# Patient Record
Sex: FEMALE | Race: WHITE | NOT HISPANIC OR LATINO | Employment: UNEMPLOYED | URBAN - METROPOLITAN AREA
[De-identification: names, ages, dates, MRNs, and addresses within clinical notes are randomized per-mention and may not be internally consistent; named-entity substitution may affect disease eponyms.]

---

## 2017-02-01 ENCOUNTER — GENERIC CONVERSION - ENCOUNTER (OUTPATIENT)
Dept: OTHER | Facility: OTHER | Age: 27
End: 2017-02-01

## 2017-04-12 ENCOUNTER — GENERIC CONVERSION - ENCOUNTER (OUTPATIENT)
Dept: OTHER | Facility: OTHER | Age: 27
End: 2017-04-12

## 2017-07-14 ENCOUNTER — LAB REQUISITION (OUTPATIENT)
Dept: LAB | Facility: HOSPITAL | Age: 27
End: 2017-07-14
Payer: COMMERCIAL

## 2017-07-14 ENCOUNTER — ALLSCRIPTS OFFICE VISIT (OUTPATIENT)
Dept: OTHER | Facility: OTHER | Age: 27
End: 2017-07-14

## 2017-07-14 DIAGNOSIS — Z12.4 ENCOUNTER FOR SCREENING FOR MALIGNANT NEOPLASM OF CERVIX: ICD-10-CM

## 2017-07-14 PROCEDURE — G0145 SCR C/V CYTO,THINLAYER,RESCR: HCPCS | Performed by: OBSTETRICS & GYNECOLOGY

## 2017-07-21 LAB
LAB AP GYN PRIMARY INTERPRETATION: NORMAL
LAB AP LMP: NORMAL
Lab: NORMAL

## 2017-08-09 ENCOUNTER — APPOINTMENT (OUTPATIENT)
Dept: LAB | Facility: CLINIC | Age: 27
End: 2017-08-09
Payer: COMMERCIAL

## 2017-08-09 ENCOUNTER — TRANSCRIBE ORDERS (OUTPATIENT)
Dept: LAB | Facility: CLINIC | Age: 27
End: 2017-08-09

## 2017-08-09 DIAGNOSIS — Z00.8 HEALTH EXAMINATION IN POPULATION SURVEY: Primary | ICD-10-CM

## 2017-08-09 LAB
CHOLEST SERPL-MCNC: 161 MG/DL (ref 50–200)
EST. AVERAGE GLUCOSE BLD GHB EST-MCNC: 100 MG/DL
HBA1C MFR BLD: 5.1 % (ref 4.2–6.3)
HDLC SERPL-MCNC: 57 MG/DL (ref 40–60)
LDLC SERPL CALC-MCNC: 86 MG/DL (ref 0–100)
TRIGL SERPL-MCNC: 90 MG/DL

## 2017-08-09 PROCEDURE — 36415 COLL VENOUS BLD VENIPUNCTURE: CPT

## 2017-08-09 PROCEDURE — 80061 LIPID PANEL: CPT

## 2017-08-09 PROCEDURE — 83036 HEMOGLOBIN GLYCOSYLATED A1C: CPT

## 2017-10-04 ENCOUNTER — LAB REQUISITION (OUTPATIENT)
Dept: LAB | Facility: HOSPITAL | Age: 27
End: 2017-10-04
Payer: COMMERCIAL

## 2017-10-04 ENCOUNTER — GENERIC CONVERSION - ENCOUNTER (OUTPATIENT)
Dept: OTHER | Facility: OTHER | Age: 27
End: 2017-10-04

## 2017-10-04 DIAGNOSIS — Z11.3 ENCOUNTER FOR SCREENING FOR INFECTIONS WITH PREDOMINANTLY SEXUAL MODE OF TRANSMISSION: ICD-10-CM

## 2017-10-04 PROCEDURE — 87591 N.GONORRHOEAE DNA AMP PROB: CPT | Performed by: OBSTETRICS & GYNECOLOGY

## 2017-10-04 PROCEDURE — 87491 CHLMYD TRACH DNA AMP PROBE: CPT | Performed by: OBSTETRICS & GYNECOLOGY

## 2017-10-05 LAB
CHLAMYDIA DNA CVX QL NAA+PROBE: NORMAL
N GONORRHOEA DNA GENITAL QL NAA+PROBE: NORMAL

## 2017-10-23 ENCOUNTER — GENERIC CONVERSION - ENCOUNTER (OUTPATIENT)
Dept: OTHER | Facility: OTHER | Age: 27
End: 2017-10-23

## 2017-10-23 DIAGNOSIS — N64.4 MASTODYNIA: ICD-10-CM

## 2017-11-30 ENCOUNTER — HOSPITAL ENCOUNTER (OUTPATIENT)
Dept: ULTRASOUND IMAGING | Facility: CLINIC | Age: 27
Discharge: HOME | End: 2017-11-30
Payer: COMMERCIAL

## 2017-11-30 DIAGNOSIS — N64.4 MASTODYNIA: ICD-10-CM

## 2017-11-30 PROCEDURE — 76642 ULTRASOUND BREAST LIMITED: CPT

## 2017-12-05 ENCOUNTER — HOSPITAL ENCOUNTER (OUTPATIENT)
Dept: RADIOLOGY | Facility: HOSPITAL | Age: 27
Discharge: HOME | End: 2017-12-05
Attending: ORTHOPAEDIC SURGERY
Payer: COMMERCIAL

## 2017-12-05 ENCOUNTER — GENERIC CONVERSION - ENCOUNTER (OUTPATIENT)
Dept: OTHER | Facility: OTHER | Age: 27
End: 2017-12-05

## 2017-12-05 DIAGNOSIS — M25.562 PAIN IN LEFT KNEE: ICD-10-CM

## 2017-12-05 PROCEDURE — 73564 X-RAY EXAM KNEE 4 OR MORE: CPT

## 2017-12-06 ENCOUNTER — TRANSCRIBE ORDERS (OUTPATIENT)
Dept: ADMINISTRATIVE | Facility: HOSPITAL | Age: 27
End: 2017-12-06

## 2017-12-06 DIAGNOSIS — M25.562 LEFT KNEE PAIN, UNSPECIFIED CHRONICITY: Primary | ICD-10-CM

## 2017-12-17 ENCOUNTER — HOSPITAL ENCOUNTER (OUTPATIENT)
Dept: RADIOLOGY | Facility: HOSPITAL | Age: 27
Discharge: HOME | End: 2017-12-17
Attending: ORTHOPAEDIC SURGERY
Payer: COMMERCIAL

## 2017-12-17 ENCOUNTER — GENERIC CONVERSION - ENCOUNTER (OUTPATIENT)
Dept: OTHER | Facility: OTHER | Age: 27
End: 2017-12-17

## 2017-12-17 DIAGNOSIS — M25.562 LEFT KNEE PAIN, UNSPECIFIED CHRONICITY: ICD-10-CM

## 2017-12-17 PROCEDURE — 73721 MRI JNT OF LWR EXTRE W/O DYE: CPT

## 2017-12-21 ENCOUNTER — GENERIC CONVERSION - ENCOUNTER (OUTPATIENT)
Dept: OTHER | Facility: OTHER | Age: 27
End: 2017-12-21

## 2018-01-10 NOTE — MISCELLANEOUS
Provider Comments  Provider Comments:   PT NO SHOW 02/01/2017      Signatures   Electronically signed by :  [facility], ; Feb 1 2017  9:32AM EST                       (Author)

## 2018-01-12 NOTE — PROGRESS NOTES
Assessment    1  Low grade squamous intraepithelial lesion (LGSIL) (796 9) (R89 6)    Plan  Low grade squamous intraepithelial lesion (LGSIL)    · Follow-up visit in 3 months Evaluation and Treatment  Follow-up  Status: Hold For -  Scheduling  Requested for: [id number]   Ordered; For: Low grade squamous intraepithelial lesion (LGSIL); Ordered By: Sofia Mckenzie Performed:  Due: [id number]    Discussion/Summary  Discussion Summary:   As noted above here for followup visit post cryocautery of Cervix  Doing well no concerns cervix is healing normally  Recommend repeat Pap in three-months  All questions answered, followup as noted above  Chief Complaint  Chief Complaint Free Text Note Form: pt here for a follow up on the cryo pt is not having any problems from that or any new problems or concerns      History of Present Illness  HPI: 79-year-old female here for followup visit recent cryocautery of cervix for low-grade NANDA  Patient has done well post procedure has no new problems or concerns to      Active Problems    1  Birth control (V25 9) (Z30 9)   2  Encounter for routine gynecological examination (V72 31) (Z01 419)   3  Low grade squamous intraepithelial lesion (LGSIL) (796 9) (R89 6)   4  Pap smear abnormality of cervix with LGSIL (795 03) (T09 520)    Surgical History    1  History of Oral Surgery    Family History    1  No pertinent family history    2  No pertinent family history    Social History    · Always uses seat belt   · Caffeine use (V49 89) (F15 90)   · Never a smoker   · No drug use   · Single   · Social alcohol use (F10 99)    Current Meds   1  CeleXA 20 MG Oral Tablet; Therapy: (Recorded:19Nov2015) to Recorded   2  Tri-Sprintec 0 18/0 215/0 25 MG-35 MCG Oral Tablet; TAKE 1 TABLET DAILY  Requested   for: 38VAE0645; Last Rx:19Nov2015 Ordered   3  Tri-Sprintec 0 18/0 215/0 25 MG-35 MCG Oral Tablet; TAKE 1 TABLET DAILY AS   DIRECTED; Therapy: 80GUI1241 to (Evaluate:66Hkt4968);  Last Rx:06Vfn2488 Ordered    Allergies    1  No Known Drug Allergies    Vitals  Vital Signs [Data Includes: Current Encounter]    Recorded: 21DBP3158 08:66JB   Systolic 080, LUE, Sitting   Diastolic 70, LUE, Sitting   Height 5 ft 5 in   Weight 136 lb 8 oz   BMI Calculated 22 71   BSA Calculated 1 68     Physical Exam    Constitutional   General appearance: No acute distress, well appearing and well nourished  Genitourinary   External genitalia: Normal and no lesions appreciated  Vagina: Normal, no lesions or dryness appreciated  Cervix: Normal, no palpable masses  Uterus: Normal, non-tender, not enlarged, and no palpable masses  Adnexa/parametria: Normal, non-tender and no fullness or masses appreciated         Signatures   Electronically signed by : Chuy Gao DO; Jan 25 2016  4:37PM EST                       (Author)

## 2018-01-13 VITALS
WEIGHT: 122 LBS | BODY MASS INDEX: 20.33 KG/M2 | HEIGHT: 65 IN | SYSTOLIC BLOOD PRESSURE: 110 MMHG | DIASTOLIC BLOOD PRESSURE: 65 MMHG

## 2018-01-17 NOTE — MISCELLANEOUS
Provider Comments  Provider Comments:   pt missed appointment lmom asking pt to give office a call back  Signatures   Electronically signed by : Tyrone Wall, ; Apr 25 2016  9:28AM EST                       (Author)    Electronically signed by : Giovany Stein DO;  Apr 25 2016 11:15AM EST                       (Author)

## 2018-01-17 NOTE — MISCELLANEOUS
Provider Comments  Provider Comments:   PT NO SHOW 04/12/2017      Signatures   Electronically signed by :  Dilshad Sawant, ; Apr 12 2017 12:22PM EST                       (Author)

## 2018-01-22 VITALS
SYSTOLIC BLOOD PRESSURE: 112 MMHG | DIASTOLIC BLOOD PRESSURE: 60 MMHG | HEIGHT: 65 IN | WEIGHT: 118 LBS | BODY MASS INDEX: 19.66 KG/M2

## 2018-01-22 VITALS
DIASTOLIC BLOOD PRESSURE: 70 MMHG | WEIGHT: 119 LBS | SYSTOLIC BLOOD PRESSURE: 112 MMHG | HEIGHT: 65 IN | BODY MASS INDEX: 19.83 KG/M2

## 2018-01-24 VITALS
DIASTOLIC BLOOD PRESSURE: 73 MMHG | WEIGHT: 119 LBS | SYSTOLIC BLOOD PRESSURE: 112 MMHG | HEART RATE: 100 BPM | BODY MASS INDEX: 19.83 KG/M2 | HEIGHT: 65 IN

## 2018-01-24 VITALS — SYSTOLIC BLOOD PRESSURE: 99 MMHG | HEIGHT: 65 IN | HEART RATE: 80 BPM | DIASTOLIC BLOOD PRESSURE: 66 MMHG

## 2018-01-29 ENCOUNTER — TRANSCRIBE ORDERS (OUTPATIENT)
Dept: LAB | Facility: CLINIC | Age: 28
End: 2018-01-29

## 2018-01-29 ENCOUNTER — APPOINTMENT (OUTPATIENT)
Dept: LAB | Facility: CLINIC | Age: 28
End: 2018-01-29
Payer: COMMERCIAL

## 2018-01-29 DIAGNOSIS — M54.5 LOW BACK PAIN, UNSPECIFIED BACK PAIN LATERALITY, UNSPECIFIED CHRONICITY, WITH SCIATICA PRESENCE UNSPECIFIED: ICD-10-CM

## 2018-01-29 DIAGNOSIS — R79.9 ABNORMAL BLOOD FINDINGS: ICD-10-CM

## 2018-01-29 DIAGNOSIS — R35.1 NOCTURIA: ICD-10-CM

## 2018-01-29 DIAGNOSIS — M25.562 ARTHRALGIA OF LEFT KNEE: ICD-10-CM

## 2018-01-29 DIAGNOSIS — E78.5 HYPERLIPIDEMIA, UNSPECIFIED HYPERLIPIDEMIA TYPE: ICD-10-CM

## 2018-01-29 DIAGNOSIS — R45.86 BRIEF COMPENSATORY MOOD SWINGS: ICD-10-CM

## 2018-01-29 DIAGNOSIS — E78.00 HYPERCHOLESTEROLEMIA: ICD-10-CM

## 2018-01-29 DIAGNOSIS — R68.82 DECREASED LIBIDO: ICD-10-CM

## 2018-01-29 DIAGNOSIS — R73.01 IFG (IMPAIRED FASTING GLUCOSE): Primary | ICD-10-CM

## 2018-01-29 DIAGNOSIS — E53.8 VITAMIN B 12 DEFICIENCY: ICD-10-CM

## 2018-01-29 DIAGNOSIS — E28.39 HYPOGONADISM, OVARIAN: ICD-10-CM

## 2018-01-29 DIAGNOSIS — E55.9 VITAMIN D DEFICIENCY: ICD-10-CM

## 2018-01-29 DIAGNOSIS — R73.01 IFG (IMPAIRED FASTING GLUCOSE): ICD-10-CM

## 2018-01-29 DIAGNOSIS — R82.90 ABNORMAL URINE: ICD-10-CM

## 2018-01-29 DIAGNOSIS — F41.9 ANXIETY: ICD-10-CM

## 2018-01-29 DIAGNOSIS — E88.81 METABOLIC SYNDROME: ICD-10-CM

## 2018-01-29 DIAGNOSIS — R53.83 OTHER FATIGUE: ICD-10-CM

## 2018-01-29 LAB
ALBUMIN SERPL BCP-MCNC: 3.5 G/DL (ref 3.5–5)
ALP SERPL-CCNC: 47 U/L (ref 46–116)
ALT SERPL W P-5'-P-CCNC: 17 U/L (ref 12–78)
ANION GAP SERPL CALCULATED.3IONS-SCNC: 6 MMOL/L (ref 4–13)
AST SERPL W P-5'-P-CCNC: 16 U/L (ref 5–45)
BILIRUB SERPL-MCNC: 0.3 MG/DL (ref 0.2–1)
BUN SERPL-MCNC: 7 MG/DL (ref 5–25)
CALCIUM SERPL-MCNC: 8.5 MG/DL (ref 8.3–10.1)
CHLORIDE SERPL-SCNC: 107 MMOL/L (ref 100–108)
CO2 SERPL-SCNC: 26 MMOL/L (ref 21–32)
CREAT SERPL-MCNC: 0.63 MG/DL (ref 0.6–1.3)
GFR SERPL CREATININE-BSD FRML MDRD: 125 ML/MIN/1.73SQ M
GLUCOSE P FAST SERPL-MCNC: 84 MG/DL (ref 65–99)
MAGNESIUM SERPL-MCNC: 2.2 MG/DL (ref 1.6–2.6)
PHOSPHATE SERPL-MCNC: 3.1 MG/DL (ref 2.7–4.5)
POTASSIUM SERPL-SCNC: 4.5 MMOL/L (ref 3.5–5.3)
PROT SERPL-MCNC: 7.1 G/DL (ref 6.4–8.2)
SODIUM SERPL-SCNC: 139 MMOL/L (ref 136–145)
T3FREE SERPL-MCNC: 2.9 PG/ML (ref 2.3–4.2)
TSH SERPL DL<=0.05 MIU/L-ACNC: 2.02 UIU/ML (ref 0.36–3.74)
URATE SERPL-MCNC: 4.3 MG/DL (ref 2–6.8)
VIT B12 SERPL-MCNC: 363 PG/ML (ref 100–900)

## 2018-01-29 PROCEDURE — 82607 VITAMIN B-12: CPT

## 2018-01-29 PROCEDURE — 84443 ASSAY THYROID STIM HORMONE: CPT

## 2018-01-29 PROCEDURE — 36415 COLL VENOUS BLD VENIPUNCTURE: CPT

## 2018-01-29 PROCEDURE — 83735 ASSAY OF MAGNESIUM: CPT

## 2018-01-29 PROCEDURE — 83789 MASS SPECTROMETRY QUAL/QUAN: CPT

## 2018-01-29 PROCEDURE — 84481 FREE ASSAY (FT-3): CPT

## 2018-01-29 PROCEDURE — 84100 ASSAY OF PHOSPHORUS: CPT

## 2018-01-29 PROCEDURE — 84550 ASSAY OF BLOOD/URIC ACID: CPT

## 2018-01-29 PROCEDURE — 80053 COMPREHEN METABOLIC PANEL: CPT

## 2018-01-31 LAB — IODINE SERPL-MCNC: 72.9 UG/L (ref 40–92)

## 2018-04-06 ENCOUNTER — HOSPITAL ENCOUNTER (EMERGENCY)
Facility: HOSPITAL | Age: 28
Discharge: HOME | End: 2018-04-06
Attending: EMERGENCY MEDICINE | Admitting: EMERGENCY MEDICINE
Payer: OTHER MISCELLANEOUS

## 2018-04-06 VITALS
SYSTOLIC BLOOD PRESSURE: 128 MMHG | HEIGHT: 65 IN | RESPIRATION RATE: 18 BRPM | OXYGEN SATURATION: 99 % | WEIGHT: 120 LBS | HEART RATE: 81 BPM | TEMPERATURE: 97.2 F | BODY MASS INDEX: 19.99 KG/M2 | DIASTOLIC BLOOD PRESSURE: 56 MMHG

## 2018-04-06 DIAGNOSIS — IMO0001 NEEDLE STICK INJURY OF FINGER OF LEFT HAND, INITIAL ENCOUNTER: Primary | ICD-10-CM

## 2018-04-06 LAB — ALT SERPL W P-5'-P-CCNC: 14 U/L (ref 12–78)

## 2018-04-06 PROCEDURE — 86803 HEPATITIS C AB TEST: CPT | Performed by: EMERGENCY MEDICINE

## 2018-04-06 PROCEDURE — 86706 HEP B SURFACE ANTIBODY: CPT | Performed by: EMERGENCY MEDICINE

## 2018-04-06 PROCEDURE — 87340 HEPATITIS B SURFACE AG IA: CPT | Performed by: EMERGENCY MEDICINE

## 2018-04-06 PROCEDURE — 36415 COLL VENOUS BLD VENIPUNCTURE: CPT | Performed by: EMERGENCY MEDICINE

## 2018-04-06 PROCEDURE — 87389 HIV-1 AG W/HIV-1&-2 AB AG IA: CPT | Performed by: EMERGENCY MEDICINE

## 2018-04-06 PROCEDURE — 99283 EMERGENCY DEPT VISIT LOW MDM: CPT

## 2018-04-06 PROCEDURE — 84460 ALANINE AMINO (ALT) (SGPT): CPT | Performed by: EMERGENCY MEDICINE

## 2018-04-06 NOTE — DISCHARGE INSTRUCTIONS
Puncture Wound   WHAT YOU NEED TO KNOW:   A puncture wound is a hole in the skin made by a sharp, pointed object  WHILE YOU ARE HERE:   Informed consent  is a legal document that explains the tests, treatments, or procedures that you may need  Informed consent means you understand what will be done and can make decisions about what you want  You give your permission when you sign the consent form  You can have someone sign this form for you if you are not able to sign it  You have the right to understand your medical care in words you know  Before you sign the consent form, understand the risks and benefits of what will be done  Make sure all your questions are answered  Medicines:   · Antibiotics  help fight or prevent an infection caused by bacteria  · NSAIDs  help decrease swelling, pain, and fever  · A Td vaccine  is a booster shot used to help prevent diphtheria and tetanus  The Td booster may be given to adolescents and adults every 10 years or for certain wounds and injuries  Tests:   · Blood tests  may be done to check for infection  · X-rays  may be done to look for broken bones or other injuries around your wound  They may also be used to check for foreign objects such as dirt or metal     · A CT or MRI scan  may be used to take pictures of the bones and tissues in your wound area  healthcare providers use these pictures to look for objects left in the wound or near the bones  You may be given dye to help the bones and tissues show up better  Tell the healthcare provider if you have ever had an allergic reaction to contrast dye  Do not enter the MRI room with anything metal  Metal can cause serious injury  Tell the healthcare provider if you have any metal in or on your body  · An ultrasound  uses sound waves to show pictures of your organs and tissues on a monitor  · A bone scan  is a test that may be done to look for broken bones or infections   A radioactive liquid, called a tracer, is given through an IV  The tracer collects in your bones so problems show up better on the monitor  Treatment:   · Cleansing  may be done by rinsing the wound with sterile water  Germ-killing solutions may also be used  Your healthcare provider may cut open a part of the affected area to clean it better  · Debridement  is surgery to clean and remove objects, dirt, or dead skin and tissues from the wound area  Your healthcare providers may also drain the wound to clean out pus  · Surgery  may be needed if your wound is deep and blood vessels, bones, or nerves need to be repaired  Your wound may be left open until it heals, or it may be closed right away with stitches  RISKS:   A puncture wound may be more serious than it looks  Blood vessels, nerves, bones, and other tissues under the skin may be damaged  The wound may become infected when germs get into it  Infection often occurs when the object that caused the wound carries germs or pushes dirt into the tissues  CARE AGREEMENT:   You have the right to help plan your care  Learn about your health condition and how it may be treated  Discuss treatment options with your caregivers to decide what care you want to receive  You always have the right to refuse treatment  © 2016 0168 Kasandra Delgado is for End User's use only and may not be sold, redistributed or otherwise used for commercial purposes  All illustrations and images included in CareNotes® are the copyrighted property of [employer]  or Raimundo Long  The above information is an  only  It is not intended as medical advice for individual conditions or treatments  Talk to your doctor, nurse or pharmacist before following any medical regimen to see if it is safe and effective for you

## 2018-04-06 NOTE — ED PROVIDER NOTES
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 and sustained a needlestick injury to the left thumb  This was a suture needle, not a hyper dome rectal   Patient was wearing gloves  Patient states she took her cause of 7 washed the area thoroughly  Patient is up-to-date on her hepatitis vaccines, 3 in childhood according to her but she does not remember the particular date  Patient is up-to-date on her tetanus  Patient received vaccines when she started working at [address] [place] [address] [employer] in 2015  Patient denies any other symptoms  Patient denies skin changes  Patient does not know the source patient's past medical history  Source patient is currently in the OR  Patient denies fever chills rigors headache lightheadedness dizziness chest pain palpitations shortness of breath cough pleurisy abdominal pain nausea vomiting diarrhea constipation urinary symptoms motor weakness numbness tingling    Prior to Admission Medications   Prescriptions Last Dose Informant Patient Reported? Taking? ALPRAZolam (XANAX) 0 25 mg tablet   Yes No   Sig: Take 0 25 mg by mouth daily at bedtime as needed for anxiety  Aspirin-Acetaminophen-Caffeine (EXCEDRIN PO)   Yes No   Sig: Take 1 tablet by mouth daily as needed  Norgestim-Eth Estrad Triphasic (TRI-SPRINTEC PO)   Yes No   Sig: Take 1 tablet by mouth daily  citalopram (CeleXA) 10 mg tablet   Yes No   Sig: Take 10 mg by mouth daily  multivitamin (THERAGRAN) TABS   Yes No   Sig: Take 1 tablet by mouth daily        Facility-Administered Medications: None       Past Medical History:   Diagnosis Date  Anxiety     mild    Depression     Headache     migranes    IBS (irritable bowel syndrome)        Past Surgical History:   Procedure Laterality Date    HI COLONOSCOPY FLX DX W/COLLJ SPEC WHEN PFRMD N/A 8/24/2016    Procedure: EGD AND COLONOSCOPY;  Surgeon: Grecia Rodriguez MD;  Location: [facility]; Service: Gastroenterology    WISDOM TOOTH EXTRACTION      all four       No family history on file  I have reviewed and agree with the history as documented  Social History   Substance Use Topics    Smoking status: Never Smoker    Smokeless tobacco: Never Used    Alcohol use Yes      Comment: socially        Review of Systems   Constitutional: Negative for activity change, appetite change, chills, diaphoresis, fatigue, fever and unexpected weight change  HENT: Negative for congestion, ear discharge, ear pain, facial swelling, hearing loss, nosebleeds, postnasal drip, rhinorrhea, sinus pressure, sneezing, sore throat and tinnitus  Eyes: Negative for photophobia, pain, redness, itching and visual disturbance  Respiratory: Negative for cough, chest tightness, shortness of breath, wheezing and stridor  Cardiovascular: Negative for chest pain, palpitations and leg swelling  Gastrointestinal: Negative for abdominal distention, abdominal pain, anal bleeding, blood in stool, constipation, diarrhea, nausea and vomiting  Endocrine: Negative for polydipsia, polyphagia and polyuria  Genitourinary: Negative for decreased urine volume, difficulty urinating, dysuria, enuresis, flank pain, frequency, hematuria, menstrual problem, urgency, vaginal bleeding, vaginal discharge and vaginal pain  Musculoskeletal: Negative for arthralgias, back pain, gait problem, joint swelling, myalgias, neck pain and neck stiffness  Skin: Negative for rash and wound  Allergic/Immunologic: Negative for environmental allergies, food allergies and immunocompromised state     Neurological: Negative for dizziness, tremors, seizures, syncope, facial asymmetry, speech difficulty, weakness, light-headedness, numbness and headaches  Hematological: Negative for adenopathy  Psychiatric/Behavioral: Negative for agitation, behavioral problems, confusion, dysphoric mood, hallucinations, self-injury, sleep disturbance and suicidal ideas  The patient is not nervous/anxious and is not hyperactive  Physical Exam  ED Triage Vitals [04/06/18 1345]   Temperature Pulse Respirations Blood Pressure SpO2   (!) 97 2 °F (36 2 °C) 81 18 128/56 99 %      Temp Source Heart Rate Source Patient Position - Orthostatic VS BP Location FiO2 (%)   Oral Monitor Sitting Left arm --      Pain Score       No Pain           Orthostatic Vital Signs  Vitals:    04/06/18 1345   BP: 128/56   Pulse: 81   Patient Position - Orthostatic VS: Sitting       Physical Exam   Constitutional: She is oriented to person, place, and time  She appears well-developed and well-nourished  No distress  HENT:   Head: Normocephalic and atraumatic  Right Ear: External ear normal    Left Ear: External ear normal    Nose: Nose normal    Mouth/Throat: Oropharynx is clear and moist  No oropharyngeal exudate  Eyes: Conjunctivae and EOM are normal  Pupils are equal, round, and reactive to light  Right eye exhibits no discharge  Left eye exhibits no discharge  No scleral icterus  Neck: Normal range of motion  Neck supple  No JVD present  No tracheal deviation present  No thyromegaly present  Cardiovascular: Normal rate, regular rhythm, normal heart sounds and intact distal pulses  No murmur heard  Pulmonary/Chest: Effort normal and breath sounds normal  No stridor  No respiratory distress  She has no wheezes  Abdominal: Soft  Bowel sounds are normal  She exhibits no distension and no mass  There is no tenderness  There is no rebound and no guarding  No hernia  Musculoskeletal: Normal range of motion  She exhibits no edema, tenderness or deformity          Right hand: She exhibits normal range of motion, no tenderness, no bony tenderness, normal two-point discrimination, normal capillary refill, no deformity and no laceration  Normal sensation noted  Decreased sensation is not present in the ulnar distribution, is not present in the medial distribution and is not present in the radial distribution  Normal strength noted  She exhibits no finger abduction, no thumb/finger opposition and no wrist extension trouble  Left hand: She exhibits normal range of motion, no tenderness, no bony tenderness, normal two-point discrimination, normal capillary refill, no deformity, no laceration and no swelling  Normal sensation noted  Decreased sensation is not present in the ulnar distribution, is not present in the medial redistribution and is not present in the radial distribution  Normal strength noted  She exhibits no finger abduction, no thumb/finger opposition and no wrist extension trouble  Lymphadenopathy:     She has no cervical adenopathy  Neurological: She is alert and oriented to person, place, and time  She displays normal reflexes  No cranial nerve deficit  She exhibits normal muscle tone  Skin: Skin is warm and dry  No rash noted  She is not diaphoretic  No erythema  Psychiatric: She has a normal mood and affect  Her behavior is normal  Judgment and thought content normal    Nursing note and vitals reviewed  ED Medications  Medications - No data to display    Diagnostic Studies  Results Reviewed     Procedure Component Value Units Date/Time    HIV 1/2 AG-AB combo [[id number]] Collected:  04/06/18 1418    Lab Status: In process Specimen:  Blood from Arm, Right Updated:  04/06/18 1422    ALT [[id number]] Collected:  04/06/18 1418    Lab Status: In process Specimen:  Blood from Arm, Right Updated:  04/06/18 1422    Hepatitis B surface antigen [[id number]] Collected:  04/06/18 1418    Lab Status:   In process Specimen:  Blood from Arm, Right Updated:  04/06/18 1422 Hepatitis C antibody [[id number]] Collected:  04/06/18 1418    Lab Status: In process Specimen:  Blood from Arm, Right Updated:  04/06/18 1422    Hepatitis B surface antibody [[id number]] Collected:  04/06/18 1418    Lab Status: In process Specimen:  Blood from Arm, Right Updated:  04/06/18 1422                 No orders to display         Procedures  Procedures      Phone Consults  ED Phone Contact    ED Course  ED Course                                MDM  Number of Diagnoses or Management Options  Needle stick injury of finger of left hand, initial encounter:   Diagnosis management comments: 47-year-old female presenting with needlestick injury to her left thumb  Surgical tech at [employer]  On exam vital signs normal normal physical exam including the site of injury no signs of infection no erythema lymphangitis  Drawn exposure panel from patient  Contacted surgical team Jameel Yañez agreed to drugs posterior panel on patient  Patient's name is documented in paper chart with paperwork filled out and faxed employ health  Patient is up-to-date on vaccines, needle 6 sounds low risk as this is a suture needle not large gauge hypodermic needle  Patient does not have infectious disease when talking to surgery team   Patient will follow up employee health the next 3 days for follow up on rapid exposure panel  ED return precautions discussed  Patient agrees to follow up with [employer] health  CritCare Time    Disposition  Final diagnoses:   Needle stick injury of finger of left hand, initial encounter     Time reflects when diagnosis was documented in both MDM as applicable and the Disposition within this note     Time User Action Codes Description Comment    4/6/2018  2:20 PM Danielle Lopez Add [C70 046A,  W27  3XXA] Needle stick injury of finger of left hand, initial encounter       ED Disposition     ED Disposition Condition Comment    Discharge  [facility] discharge to home/self care     Condition at discharge: Good    Return precautions were discussed with patient  Patient understands when to return to  Emergency department  Patient agrees to discharge plan and follow up care  Follow-up Information     Follow up With Specialties Details Why Contact Info Additional Information    Denzel Álvarez in 3 days  [address]  [address], [address] [address], [place], [place], [address]        Patient's Medications   Discharge Prescriptions    No medications on file     No discharge procedures on file  ED Provider  Attending physically available and evaluated Samir Santos I managed the patient along with the ED Attending      Electronically Signed by         Manny Newton DO  04/06/18 0716

## 2018-04-06 NOTE — ED ATTENDING ATTESTATION
Trina Angeles DO, saw and evaluated the patient  I have discussed the patient with the resident/non-physician practitioner and agree with the resident's/non-physician practitioner's findings, Plan of Care, and MDM as documented in the resident's/non-physician practitioner's note, except where noted  All available labs and Radiology studies were reviewed  At this point I agree with the current assessment done in the Emergency Department  I have conducted an independent evaluation of this patient a history and physical is as follows:    24 F works as OR nurse  Stuck with suture needle  Was wearing gloves  Immediatley washed area  Labs obtained from patient and source patient, workman injury/accidental exposure paperwork completed and sent to supervisor, no need for empiric treatment at this time  Low risk exposure       Critical Care Time  CritCare Time    Procedures

## 2018-04-07 LAB
HBV SURFACE AB SER-ACNC: 18.88 MIU/ML
HBV SURFACE AG SER QL: NORMAL
HCV AB SER QL: NORMAL

## 2018-04-09 LAB — HIV 1+2 AB+HIV1 P24 AG SERPL QL IA: NORMAL

## 2018-08-02 ENCOUNTER — ANNUAL EXAM (OUTPATIENT)
Dept: OBGYN CLINIC | Facility: CLINIC | Age: 28
End: 2018-08-02
Payer: COMMERCIAL

## 2018-08-02 VITALS
SYSTOLIC BLOOD PRESSURE: 98 MMHG | DIASTOLIC BLOOD PRESSURE: 56 MMHG | BODY MASS INDEX: 20.73 KG/M2 | WEIGHT: 124.4 LBS | HEIGHT: 65 IN

## 2018-08-02 DIAGNOSIS — Z12.4 SCREENING FOR CERVICAL CANCER: ICD-10-CM

## 2018-08-02 DIAGNOSIS — Z30.41 ENCOUNTER FOR SURVEILLANCE OF CONTRACEPTIVE PILLS: Primary | ICD-10-CM

## 2018-08-02 DIAGNOSIS — Z87.410 PERSONAL HISTORY OF CERVICAL DYSPLASIA: ICD-10-CM

## 2018-08-02 DIAGNOSIS — Z11.51 SCREENING FOR HPV (HUMAN PAPILLOMAVIRUS): ICD-10-CM

## 2018-08-02 PROCEDURE — G0143 SCR C/V CYTO,THINLAYER,RESCR: HCPCS | Performed by: OBSTETRICS & GYNECOLOGY

## 2018-08-02 PROCEDURE — 87624 HPV HI-RISK TYP POOLED RSLT: CPT | Performed by: OBSTETRICS & GYNECOLOGY

## 2018-08-02 PROCEDURE — 99395 PREV VISIT EST AGE 18-39: CPT | Performed by: OBSTETRICS & GYNECOLOGY

## 2018-08-02 RX ORDER — NORGESTIMATE AND ETHINYL ESTRADIOL 7DAYSX3 28
1 KIT ORAL DAILY
COMMUNITY
Start: 2017-10-23 | End: 2018-08-02 | Stop reason: SDUPTHER

## 2018-08-02 RX ORDER — NORGESTIMATE AND ETHINYL ESTRADIOL 7DAYSX3 28
1 KIT ORAL DAILY
Qty: 84 TABLET | Refills: 3 | Status: SHIPPED | OUTPATIENT
Start: 2018-08-02 | End: 2019-08-19 | Stop reason: SDUPTHER

## 2018-08-02 RX ORDER — FLUOXETINE 10 MG/1
20 CAPSULE ORAL DAILY
COMMUNITY
End: 2019-08-26

## 2018-08-02 NOTE — PROGRESS NOTES
Assessment   Annual well-woman exam  Contraceptive management  No new problems or concerns        Plan   Renew OCPs  Reviewed self-breast exam   All questions answered  Await pap smear results  Subjective here for annual exam and renewal of birth control pills, no new problems     Darrell Jenkins is a 24 y o  female who presents for annual exam  Periods are regular every 28-30 days, lasting 5 days  Dysmenorrhea:none  Cyclic symptoms include none  No intermenstrual bleeding, spotting, or discharge  The patient reports that there is not domestic violence in her life  Current contraception: OCP (estrogen/progesterone)  History of abnormal Pap smear: yes -LGSIL treated with cryocautery  Family history of uterine or ovarian cancer: no  Regular self breast exam: yes  History of abnormal mammogram: no  Family history of breast cancer: no  History of abnormal lipids: no  Menstrual History:  OB History     No data available         Menarche age: 17  Patient's last menstrual period was 07/19/2018 (approximate)  Review of Systems  Pertinent items are noted in HPI  Objective no acute distress     BP 98/56 (BP Location: Left arm, Patient Position: Sitting, Cuff Size: Standard)   Ht 5' 5" (1 651 m)   Wt 56 4 kg (124 lb 6 4 oz)   LMP 07/19/2018 (Approximate)   Breastfeeding?  No   BMI 20 70 kg/m²     General:   alert and oriented, in no acute distress, alert, appears stated age and cooperative   Heart: regular rate and rhythm, S1, S2 normal, no murmur, click, rub or gallop   Lungs: clear to auscultation bilaterally   Abdomen: soft, non-tender, without masses or organomegaly   Vulva: normal, Bartholin's, Urethra, Nocona's normal, female escutcheon   Vagina: normal mucosa, normal discharge   Cervix: no bleeding following Pap, no cervical motion tenderness, no lesions and nulliparous appearance   Uterus: anteverted, mobile, non-tender, normal shape and consistency   Adnexa: normal adnexa   Breast exam bilateral breast exam in the sitting supine position with chaperone present no supraclavicular axillary lymphadenopathy noted no visible or palpable breast lesions identified no nipple discharge  Reviewed self-breast exam with patient

## 2018-08-03 LAB — HPV RRNA GENITAL QL NAA+PROBE: NORMAL

## 2018-08-07 LAB
LAB AP GYN PRIMARY INTERPRETATION: NORMAL
Lab: NORMAL

## 2018-08-14 ENCOUNTER — APPOINTMENT (OUTPATIENT)
Dept: LAB | Facility: CLINIC | Age: 28
End: 2018-08-14

## 2018-08-14 ENCOUNTER — TRANSCRIBE ORDERS (OUTPATIENT)
Dept: ADMINISTRATIVE | Facility: HOSPITAL | Age: 28
End: 2018-08-14

## 2018-08-14 DIAGNOSIS — Z00.8 HEALTH EXAMINATION IN POPULATION SURVEY: ICD-10-CM

## 2018-08-14 DIAGNOSIS — Z00.8 HEALTH EXAMINATION IN POPULATION SURVEY: Primary | ICD-10-CM

## 2018-08-14 LAB
CHOLEST SERPL-MCNC: 136 MG/DL (ref 50–200)
EST. AVERAGE GLUCOSE BLD GHB EST-MCNC: 85 MG/DL
HBA1C MFR BLD: 4.6 % (ref 4.2–6.3)
HDLC SERPL-MCNC: 46 MG/DL (ref 40–60)
LDLC SERPL CALC-MCNC: 65 MG/DL (ref 0–100)
NONHDLC SERPL-MCNC: 90 MG/DL
TRIGL SERPL-MCNC: 124 MG/DL

## 2018-08-14 PROCEDURE — 83036 HEMOGLOBIN GLYCOSYLATED A1C: CPT

## 2018-08-14 PROCEDURE — 80061 LIPID PANEL: CPT

## 2018-08-14 PROCEDURE — 36415 COLL VENOUS BLD VENIPUNCTURE: CPT

## 2018-11-15 ENCOUNTER — OFFICE VISIT (OUTPATIENT)
Dept: OBGYN CLINIC | Facility: CLINIC | Age: 28
End: 2018-11-15
Payer: COMMERCIAL

## 2018-11-15 VITALS
BODY MASS INDEX: 20.49 KG/M2 | SYSTOLIC BLOOD PRESSURE: 100 MMHG | DIASTOLIC BLOOD PRESSURE: 68 MMHG | HEIGHT: 65 IN | WEIGHT: 123 LBS

## 2018-11-15 DIAGNOSIS — R87.615 PAP SMEAR OF CERVIX UNSATISFACTORY: ICD-10-CM

## 2018-11-15 DIAGNOSIS — Z30.41 ENCOUNTER FOR SURVEILLANCE OF CONTRACEPTIVE PILLS: ICD-10-CM

## 2018-11-15 DIAGNOSIS — Z87.410 HISTORY OF CERVICAL DYSPLASIA: ICD-10-CM

## 2018-11-15 DIAGNOSIS — Z11.51 SCREENING FOR HUMAN PAPILLOMAVIRUS (HPV): Primary | ICD-10-CM

## 2018-11-15 PROCEDURE — REPAP: Performed by: OBSTETRICS & GYNECOLOGY

## 2018-11-15 PROCEDURE — G0145 SCR C/V CYTO,THINLAYER,RESCR: HCPCS | Performed by: OBSTETRICS & GYNECOLOGY

## 2018-11-15 RX ORDER — NORGESTIMATE AND ETHINYL ESTRADIOL 7DAYSX3 LO
1 KIT ORAL DAILY
COMMUNITY
End: 2019-08-26

## 2018-11-15 NOTE — PROGRESS NOTES
Assessment/Plan:    Diagnoses and all orders for this visit:    Pap smear of cervix unsatisfactory    History of cervical dysplasia    Encounter for surveillance of contraceptive pills    Other orders  -     norgestimate-ethinyl estradiol (ORTHO TRI-CYCLEN LO) 0 18/0 215/0 25 MG-25 MCG per tablet; Take 1 tablet by mouth daily        Subjective:  Here for repeat Pap, missing endocervical cells     Patient ID: Roselyn Mckeon is a 34 y o  female  HPI   16-year-old female nulliparous here for annual exam in August this past year  Endocervical cells were missing on the Pap  She did have a history of low-grade cervical dysplasia in the past and she has had a cryocautery of the cervix as recently as 2016  Patient is on OCPs  States her cycles are regular denies any breakthrough bleeding denies any pelvic pain symptoms  Repeat Pap today if normal with the 1 more Pap next year and then surveillance could be every 3 years thereafter if normal     Review of Systems   All other systems reviewed and are negative  Objective:  /68 height 5 foot 5 weight 123 lb, no acute distress     Physical Exam   Constitutional: She is oriented to person, place, and time  She appears well-developed and well-nourished  HENT:   Head: Normocephalic  Eyes: Pupils are equal, round, and reactive to light  Neck: Normal range of motion  Genitourinary:   Genitourinary Comments: Pelvic exam  Normal external genitalia  Normal appearing cervix  No CMT  No pelvic masses on bimanual exam   Return for annual exam next year  Musculoskeletal: Normal range of motion  Neurological: She is alert and oriented to person, place, and time  Skin: Skin is warm and dry  Psychiatric: She has a normal mood and affect

## 2018-11-26 LAB
LAB AP GYN PRIMARY INTERPRETATION: NORMAL
Lab: NORMAL

## 2019-02-22 ENCOUNTER — TRANSCRIBE ORDERS (OUTPATIENT)
Dept: ADMINISTRATIVE | Facility: HOSPITAL | Age: 29
End: 2019-02-22

## 2019-02-22 ENCOUNTER — CLINICAL SUPPORT (OUTPATIENT)
Dept: URGENT CARE | Facility: CLINIC | Age: 29
End: 2019-02-22
Payer: COMMERCIAL

## 2019-02-22 DIAGNOSIS — R00.2 PALPITATIONS: Primary | ICD-10-CM

## 2019-02-22 DIAGNOSIS — R00.2 PALPITATIONS: ICD-10-CM

## 2019-02-22 PROCEDURE — 93005 ELECTROCARDIOGRAM TRACING: CPT

## 2019-02-24 LAB
ATRIAL RATE: 73 BPM
P AXIS: 26 DEGREES
PR INTERVAL: 130 MS
QRS AXIS: 75 DEGREES
QRSD INTERVAL: 74 MS
QT INTERVAL: 420 MS
QTC INTERVAL: 462 MS
T WAVE AXIS: 56 DEGREES
VENTRICULAR RATE: 73 BPM

## 2019-02-24 PROCEDURE — 93010 ELECTROCARDIOGRAM REPORT: CPT | Performed by: INTERNAL MEDICINE

## 2019-04-30 ENCOUNTER — TRANSCRIBE ORDERS (OUTPATIENT)
Dept: ADMINISTRATIVE | Facility: HOSPITAL | Age: 29
End: 2019-04-30

## 2019-04-30 ENCOUNTER — APPOINTMENT (OUTPATIENT)
Dept: LAB | Facility: CLINIC | Age: 29
End: 2019-04-30
Payer: COMMERCIAL

## 2019-04-30 DIAGNOSIS — R42 DIZZINESS AND GIDDINESS: ICD-10-CM

## 2019-04-30 DIAGNOSIS — E55.9 AVITAMINOSIS D: ICD-10-CM

## 2019-04-30 DIAGNOSIS — F41.9 ANXIETY HYPERVENTILATION: ICD-10-CM

## 2019-04-30 DIAGNOSIS — M25.50 PAIN IN JOINT, MULTIPLE SITES: ICD-10-CM

## 2019-04-30 DIAGNOSIS — I51.9 MYXEDEMA HEART DISEASE: ICD-10-CM

## 2019-04-30 DIAGNOSIS — E83.42 HYPOMAGNESEMIA: ICD-10-CM

## 2019-04-30 DIAGNOSIS — R68.82 DECREASED LIBIDO: ICD-10-CM

## 2019-04-30 DIAGNOSIS — E88.81 DYSMETABOLIC SYNDROME X: ICD-10-CM

## 2019-04-30 DIAGNOSIS — R73.01 IMPAIRED FASTING GLUCOSE: ICD-10-CM

## 2019-04-30 DIAGNOSIS — F45.8 ANXIETY HYPERVENTILATION: ICD-10-CM

## 2019-04-30 DIAGNOSIS — R51.9 FACIAL PAIN: ICD-10-CM

## 2019-04-30 DIAGNOSIS — R73.01 IMPAIRED FASTING GLUCOSE: Primary | ICD-10-CM

## 2019-04-30 DIAGNOSIS — R53.83 OTHER FATIGUE: ICD-10-CM

## 2019-04-30 DIAGNOSIS — K21.9 GASTROESOPHAGEAL REFLUX DISEASE WITHOUT ESOPHAGITIS: ICD-10-CM

## 2019-04-30 DIAGNOSIS — E53.8 BIOTIN-(PROPIONYL-COA-CARBOXYLASE) LIGASE DEFICIENCY: ICD-10-CM

## 2019-04-30 DIAGNOSIS — E03.9 MYXEDEMA HEART DISEASE: ICD-10-CM

## 2019-04-30 LAB
25(OH)D3 SERPL-MCNC: 25.5 NG/ML (ref 30–100)
ALBUMIN SERPL BCP-MCNC: 4.4 G/DL (ref 3.5–5)
ALP SERPL-CCNC: 66 U/L (ref 46–116)
ALT SERPL W P-5'-P-CCNC: 19 U/L (ref 12–78)
ANION GAP SERPL CALCULATED.3IONS-SCNC: 8 MMOL/L (ref 4–13)
AST SERPL W P-5'-P-CCNC: 16 U/L (ref 5–45)
BILIRUB SERPL-MCNC: 0.72 MG/DL (ref 0.2–1)
BUN SERPL-MCNC: 8 MG/DL (ref 5–25)
CALCIUM SERPL-MCNC: 8.6 MG/DL (ref 8.3–10.1)
CHLORIDE SERPL-SCNC: 104 MMOL/L (ref 100–108)
CO2 SERPL-SCNC: 28 MMOL/L (ref 21–32)
CREAT SERPL-MCNC: 0.64 MG/DL (ref 0.6–1.3)
FERRITIN SERPL-MCNC: 32 NG/ML (ref 8–388)
FOLATE SERPL-MCNC: >20 NG/ML (ref 3.1–17.5)
GFR SERPL CREATININE-BSD FRML MDRD: 124 ML/MIN/1.73SQ M
GLUCOSE SERPL-MCNC: 78 MG/DL (ref 65–140)
HETEROPH AB SER QL: NEGATIVE
IRON SATN MFR SERPL: 27 %
IRON SERPL-MCNC: 104 UG/DL (ref 50–170)
PHOSPHATE SERPL-MCNC: 4.1 MG/DL (ref 2.7–4.5)
POTASSIUM SERPL-SCNC: 3.8 MMOL/L (ref 3.5–5.3)
PROT SERPL-MCNC: 7.5 G/DL (ref 6.4–8.2)
SODIUM SERPL-SCNC: 140 MMOL/L (ref 136–145)
TIBC SERPL-MCNC: 383 UG/DL (ref 250–450)
TSH SERPL DL<=0.05 MIU/L-ACNC: 0.91 UIU/ML (ref 0.36–3.74)
VIT B12 SERPL-MCNC: 563 PG/ML (ref 100–900)

## 2019-04-30 PROCEDURE — 84443 ASSAY THYROID STIM HORMONE: CPT

## 2019-04-30 PROCEDURE — 84100 ASSAY OF PHOSPHORUS: CPT

## 2019-04-30 PROCEDURE — 83550 IRON BINDING TEST: CPT

## 2019-04-30 PROCEDURE — 83540 ASSAY OF IRON: CPT

## 2019-04-30 PROCEDURE — 86308 HETEROPHILE ANTIBODY SCREEN: CPT

## 2019-04-30 PROCEDURE — 82306 VITAMIN D 25 HYDROXY: CPT

## 2019-04-30 PROCEDURE — 82746 ASSAY OF FOLIC ACID SERUM: CPT

## 2019-04-30 PROCEDURE — 80053 COMPREHEN METABOLIC PANEL: CPT

## 2019-04-30 PROCEDURE — 36415 COLL VENOUS BLD VENIPUNCTURE: CPT

## 2019-04-30 PROCEDURE — 82607 VITAMIN B-12: CPT

## 2019-04-30 PROCEDURE — 82728 ASSAY OF FERRITIN: CPT

## 2019-04-30 PROCEDURE — 83735 ASSAY OF MAGNESIUM: CPT

## 2019-05-02 LAB — MAGNESIUM RBC-MCNC: 5.1 MG/DL (ref 4.2–6.8)

## 2019-08-14 ENCOUNTER — TRANSCRIBE ORDERS (OUTPATIENT)
Dept: ADMINISTRATIVE | Facility: HOSPITAL | Age: 29
End: 2019-08-14

## 2019-08-14 DIAGNOSIS — G40.119 EPILEPSIA PARTIALIS CONTINUA WITH INTRACTABLE EPILEPSY (HCC): Primary | ICD-10-CM

## 2019-08-19 ENCOUNTER — ANNUAL EXAM (OUTPATIENT)
Dept: OBGYN CLINIC | Facility: CLINIC | Age: 29
End: 2019-08-19
Payer: COMMERCIAL

## 2019-08-19 VITALS
BODY MASS INDEX: 20.59 KG/M2 | HEIGHT: 65 IN | WEIGHT: 123.6 LBS | DIASTOLIC BLOOD PRESSURE: 72 MMHG | SYSTOLIC BLOOD PRESSURE: 120 MMHG

## 2019-08-19 DIAGNOSIS — Z01.419 WOMEN'S ANNUAL ROUTINE GYNECOLOGICAL EXAMINATION: Primary | ICD-10-CM

## 2019-08-19 DIAGNOSIS — F41.8 INSOMNIA SECONDARY TO DEPRESSION WITH ANXIETY: ICD-10-CM

## 2019-08-19 DIAGNOSIS — Z30.41 ENCOUNTER FOR SURVEILLANCE OF CONTRACEPTIVE PILLS: ICD-10-CM

## 2019-08-19 DIAGNOSIS — Z87.410 HISTORY OF CERVICAL DYSPLASIA: ICD-10-CM

## 2019-08-19 DIAGNOSIS — F51.05 INSOMNIA SECONDARY TO DEPRESSION WITH ANXIETY: ICD-10-CM

## 2019-08-19 PROCEDURE — G0145 SCR C/V CYTO,THINLAYER,RESCR: HCPCS | Performed by: OBSTETRICS & GYNECOLOGY

## 2019-08-19 PROCEDURE — 99213 OFFICE O/P EST LOW 20 MIN: CPT | Performed by: OBSTETRICS & GYNECOLOGY

## 2019-08-19 RX ORDER — TIZANIDINE 4 MG/1
TABLET ORAL
COMMUNITY
End: 2020-03-02

## 2019-08-19 RX ORDER — SUMATRIPTAN 100 MG/1
TABLET, FILM COATED ORAL
COMMUNITY
End: 2020-03-02

## 2019-08-19 RX ORDER — NORGESTIMATE AND ETHINYL ESTRADIOL 7DAYSX3 28
1 KIT ORAL DAILY
Qty: 84 TABLET | Refills: 3 | Status: SHIPPED | OUTPATIENT
Start: 2019-08-19 | End: 2020-03-02

## 2019-08-19 RX ORDER — PAROXETINE 10 MG/1
TABLET, FILM COATED ORAL
COMMUNITY
End: 2019-08-26 | Stop reason: ALTCHOICE

## 2019-08-19 NOTE — PROGRESS NOTES
Assessment   Annual well-woman exam  Contraceptive management  History of cervical dysplasia  Anxiety and depression        Plan   Renew OCPs  Follow-up in 1 year p r n  All questions answered  Await pap smear results  Subjective here for annual exam     Xiomara Cueva is a 34 y o  female who presents for annual exam  Periods are regular every 28-30 days, lasting 5 days  Dysmenorrhea:none  Cyclic symptoms include none  No intermenstrual bleeding, spotting, or discharge  The patient reports that there is not domestic violence in her life  Current contraception: OCP (estrogen/progesterone)  History of abnormal Pap smear: yes - LGSIL treated with cryocautery in 2016  Family history of uterine or ovarian cancer: no  Regular self breast exam: yes  History of abnormal mammogram: no  Family history of breast cancer: no  History of abnormal lipids: no  Menstrual History:  OB History    None        Menarche age: 17  Patient's last menstrual period was 07/10/2019 (approximate)  Review of Systems  Pertinent items are noted in HPI        Objective no acute distress   /72 (BP Location: Right arm, Patient Position: Sitting, Cuff Size: Standard)   Ht 5' 5" (1 651 m)   Wt 56 1 kg (123 lb 9 6 oz)   LMP 07/10/2019 (Approximate)   BMI 20 57 kg/m²     General:   alert and oriented, in no acute distress, alert, appears stated age and cooperative   Heart: regular rate and rhythm, S1, S2 normal, no murmur, click, rub or gallop   Lungs: clear to auscultation bilaterally   Abdomen: soft, non-tender, without masses or organomegaly   Vulva: normal, Bartholin's, Urethra, [place]'s normal, female escutcheon   Vagina: normal mucosa, normal discharge, no palpable nodules   Cervix: no bleeding following Pap, no cervical motion tenderness, no lesions and nulliparous appearance   Uterus: normal size, non-tender, normal shape and consistency   Adnexa: normal adnexa and no mass, fullness, tenderness   Breast examBilateral breast exam in the sitting and supine position with chaperone present, no visible or palpable breast lesions identified  No breast masses noted  No supraclavicular or axillary lymphadenopathy noted  No nipple discharge  Reviewed self-breast exam techniques

## 2019-08-21 LAB
LAB AP GYN PRIMARY INTERPRETATION: NORMAL
LAB AP LMP: NORMAL
Lab: NORMAL

## 2019-08-26 ENCOUNTER — OFFICE VISIT (OUTPATIENT)
Dept: FAMILY MEDICINE CLINIC | Facility: CLINIC | Age: 29
End: 2019-08-26
Payer: COMMERCIAL

## 2019-08-26 VITALS
OXYGEN SATURATION: 98 % | WEIGHT: 123 LBS | BODY MASS INDEX: 20.49 KG/M2 | HEIGHT: 65 IN | DIASTOLIC BLOOD PRESSURE: 80 MMHG | HEART RATE: 100 BPM | SYSTOLIC BLOOD PRESSURE: 108 MMHG | TEMPERATURE: 98.9 F

## 2019-08-26 DIAGNOSIS — G43.719 INTRACTABLE CHRONIC MIGRAINE WITHOUT AURA AND WITHOUT STATUS MIGRAINOSUS: Primary | ICD-10-CM

## 2019-08-26 DIAGNOSIS — E55.9 VITAMIN D DEFICIENCY: ICD-10-CM

## 2019-08-26 DIAGNOSIS — F41.9 ANXIETY DISORDER, UNSPECIFIED TYPE: ICD-10-CM

## 2019-08-26 PROCEDURE — 99203 OFFICE O/P NEW LOW 30 MIN: CPT | Performed by: INTERNAL MEDICINE

## 2019-08-26 PROCEDURE — 1036F TOBACCO NON-USER: CPT | Performed by: INTERNAL MEDICINE

## 2019-08-26 PROCEDURE — 3008F BODY MASS INDEX DOCD: CPT | Performed by: INTERNAL MEDICINE

## 2019-08-26 RX ORDER — MELATONIN
4000 DAILY
COMMUNITY
End: 2021-04-01

## 2019-08-26 RX ORDER — PAROXETINE HYDROCHLORIDE 20 MG/1
20 TABLET, FILM COATED ORAL DAILY
COMMUNITY
End: 2020-03-02

## 2019-08-26 RX ORDER — ERGOCALCIFEROL 1.25 MG/1
50000 CAPSULE ORAL WEEKLY
COMMUNITY
End: 2020-03-02

## 2019-08-26 NOTE — PROGRESS NOTES
Assessment/Plan:     Diagnoses and all orders for this visit:    Intractable chronic migraine without aura and without status migrainosus  -Reports taking Imitrex twice a month  Currently following with a Neurologist and overall improved  Will have an EEG in September given the overnight visual complaints  Anxiety disorder, unspecified type  -Appears stable  Vitamin D deficiency  -Advised to take Vit D 4000 IU daily after completing the 50,000 IU regime  Other orders  -     PARoxetine (PAXIL) 20 mg tablet; Take 20 mg by mouth daily  -     ergocalciferol (VITAMIN D2) 50,000 units; Take 50,000 Units by mouth once a week  -     cholecalciferol (VITAMIN D3) 1,000 units tablet; Take 4,000 Units by mouth daily      Reminded to complete the Influenza vaccination  Tdap done in 2015 per patient  Subjective:      Patient ID: Armando Cleveland is a 34 y o  female  Sugey Puckett is here today to establish care  Medical chart was reviewed and updated  She has known anxiety d/o, maintained on Paxil as well as migraines  She currently follows with a Neurologist and reports that she has had some visual hallucinations overnight and is going to have an EEG completed  She is otherwise feeling well  The following portions of the patient's history were reviewed and updated as appropriate: allergies, current medications, past family history, past medical history, past social history, past surgical history and problem list     Review of Systems   Constitutional: Negative for chills, fever and unexpected weight change  HENT: Positive for rhinorrhea  Negative for sinus pressure, sinus pain and sore throat  Respiratory: Negative for cough, chest tightness, shortness of breath and wheezing  Cardiovascular: Negative for chest pain, palpitations and leg swelling  Gastrointestinal: Positive for constipation  Negative for abdominal pain, blood in stool, diarrhea, nausea and vomiting     Genitourinary: Negative for difficulty urinating and dysuria  Musculoskeletal: Negative for arthralgias, back pain and myalgias  Neurological: Negative for dizziness, syncope, numbness and headaches  Psychiatric/Behavioral: Negative for dysphoric mood and sleep disturbance  The patient is not nervous/anxious  Visual hallucinations between sleep/wake          Objective:      /80 (BP Location: Left arm, Patient Position: Sitting, Cuff Size: Standard)   Pulse 100   Temp 98 9 °F (37 2 °C)   Ht 5' 5" (1 651 m)   Wt 55 8 kg (123 lb)   SpO2 98%   BMI 20 47 kg/m²          Physical Exam   Constitutional: She is oriented to person, place, and time  She appears well-developed and well-nourished  No distress  HENT:   Head: Normocephalic and atraumatic  Mouth/Throat: Oropharynx is clear and moist    Eyes: Pupils are equal, round, and reactive to light  Conjunctivae and EOM are normal  Right eye exhibits no discharge  Left eye exhibits no discharge  Neck: Normal range of motion  Neck supple  No thyromegaly present  Cardiovascular: Normal rate, regular rhythm and normal heart sounds  Pulmonary/Chest: Effort normal and breath sounds normal  No respiratory distress  She has no wheezes  Abdominal: Soft  Bowel sounds are normal  There is no tenderness  Musculoskeletal: Normal range of motion  She exhibits no edema  Lymphadenopathy:     She has no cervical adenopathy  Neurological: She is alert and oriented to person, place, and time  Skin: Skin is warm and dry  She is not diaphoretic  Psychiatric: She has a normal mood and affect  Her speech is normal and behavior is normal  Judgment and thought content normal    Vitals reviewed

## 2019-09-11 ENCOUNTER — HOSPITAL ENCOUNTER (OUTPATIENT)
Dept: NEUROLOGY | Facility: CLINIC | Age: 29
Discharge: HOME | End: 2019-09-11
Payer: COMMERCIAL

## 2019-09-11 DIAGNOSIS — G40.119 EPILEPSIA PARTIALIS CONTINUA WITH INTRACTABLE EPILEPSY (HCC): ICD-10-CM

## 2019-09-11 PROCEDURE — 95819 EEG AWAKE AND ASLEEP: CPT

## 2019-09-17 ENCOUNTER — TRANSCRIBE ORDERS (OUTPATIENT)
Dept: ADMINISTRATIVE | Facility: HOSPITAL | Age: 29
End: 2019-09-17

## 2019-09-17 DIAGNOSIS — R44.1 VISUAL HALLUCINATION: Primary | ICD-10-CM

## 2019-10-16 ENCOUNTER — HOSPITAL ENCOUNTER (OUTPATIENT)
Dept: NEUROLOGY | Facility: CLINIC | Age: 29
Discharge: HOME | End: 2019-10-16

## 2019-10-16 DIAGNOSIS — R44.1 VISUAL HALLUCINATION: ICD-10-CM

## 2019-10-17 ENCOUNTER — HOSPITAL ENCOUNTER (OUTPATIENT)
Dept: NEUROLOGY | Facility: CLINIC | Age: 29
Discharge: HOME | End: 2019-10-17
Payer: COMMERCIAL

## 2019-10-17 DIAGNOSIS — R44.1 VISUAL HALLUCINATION: ICD-10-CM

## 2019-10-17 PROCEDURE — 95953 HB EEG MONITORING/COMPUTER: CPT

## 2019-11-28 DIAGNOSIS — J02.0 STREP THROAT: Primary | ICD-10-CM

## 2019-11-28 RX ORDER — AZITHROMYCIN 250 MG/1
TABLET, FILM COATED ORAL
Qty: 6 TABLET | Refills: 0 | Status: SHIPPED | OUTPATIENT
Start: 2019-11-28 | End: 2019-12-02

## 2020-01-13 ENCOUNTER — OFFICE VISIT (OUTPATIENT)
Dept: FAMILY MEDICINE CLINIC | Facility: CLINIC | Age: 30
End: 2020-01-13
Payer: COMMERCIAL

## 2020-01-13 VITALS
HEIGHT: 65 IN | WEIGHT: 126.2 LBS | OXYGEN SATURATION: 98 % | RESPIRATION RATE: 18 BRPM | HEART RATE: 88 BPM | SYSTOLIC BLOOD PRESSURE: 100 MMHG | BODY MASS INDEX: 21.02 KG/M2 | DIASTOLIC BLOOD PRESSURE: 50 MMHG

## 2020-01-13 DIAGNOSIS — F41.9 ANXIETY DISORDER, UNSPECIFIED TYPE: ICD-10-CM

## 2020-01-13 DIAGNOSIS — Z23 NEED FOR MENACTRA VACCINATION: ICD-10-CM

## 2020-01-13 DIAGNOSIS — E55.9 VITAMIN D DEFICIENCY: ICD-10-CM

## 2020-01-13 DIAGNOSIS — G43.719 INTRACTABLE CHRONIC MIGRAINE WITHOUT AURA AND WITHOUT STATUS MIGRAINOSUS: Primary | ICD-10-CM

## 2020-01-13 PROCEDURE — 1036F TOBACCO NON-USER: CPT | Performed by: INTERNAL MEDICINE

## 2020-01-13 PROCEDURE — 90471 IMMUNIZATION ADMIN: CPT

## 2020-01-13 PROCEDURE — 99214 OFFICE O/P EST MOD 30 MIN: CPT | Performed by: INTERNAL MEDICINE

## 2020-01-13 PROCEDURE — 3008F BODY MASS INDEX DOCD: CPT | Performed by: INTERNAL MEDICINE

## 2020-01-13 PROCEDURE — 90734 MENACWYD/MENACWYCRM VACC IM: CPT

## 2020-01-13 RX ORDER — ALPRAZOLAM 0.5 MG/1
TABLET ORAL
Qty: 10 TABLET | Refills: 0 | Status: SHIPPED | OUTPATIENT
Start: 2020-01-13 | End: 2020-03-02

## 2020-01-13 NOTE — PROGRESS NOTES
Assessment/Plan:     Diagnoses and all orders for this visit:    Intractable chronic migraine without aura and without status migrainosus  -Reports some worsening recently in regards to this  She is going to see Neurology soon as well  She is going to restart Magnesium and see if this helps as well  Anxiety disorder, unspecified type  -     ALPRAZolam (XANAX) 0 5 mg tablet; Take 1/2 tablet to 1 tablet as needed for anxiety  Largely controlled  Given xanax for travel but is otherwise doing well  Vitamin D deficiency  -     Vitamin D 25 hydroxy; Future  Will repeat level  Need for Menactra vaccination  -     MENINGOCOCCAL CONJUGATE VACCINE MCV4P IM        Subjective:      Patient ID: Hansa Potts is a 34 y o  female  Dorothye Said is here today for a follow up/immuinzations  She is going to be traveling abroad for a medical trip in February to [facility] for a period of 2 weeks  She was seen at travel clinic and did receive Yellow fever vaccination and will be getting Typhoid as well  She does not have documentation of Menactra in the past and would like this as well  We do not have records that are seen today as well  Reports mood is generally okay  She does have some anxiety when she travels and did want a script for something to hold onto  She has had this in the past without SE  Reports migraines seem to be worsening somewhat  She does have a follow up with Neurology scheduled next week as well  The following portions of the patient's history were reviewed and updated as appropriate: allergies, current medications, past family history, past medical history, past social history, past surgical history and problem list     Review of Systems   Constitutional: Negative for chills, fever and unexpected weight change  Respiratory: Negative for cough, chest tightness and shortness of breath  Cardiovascular: Negative for chest pain     Gastrointestinal: Negative for abdominal pain, diarrhea, nausea and vomiting  Musculoskeletal: Negative for arthralgias and myalgias  Neurological: Positive for headaches  Negative for dizziness and numbness  Psychiatric/Behavioral: Negative for dysphoric mood and sleep disturbance  The patient is not nervous/anxious  Objective:      /50   Pulse 88   Resp 18   Ht 5' 5" (1 651 m)   Wt 57 2 kg (126 lb 3 2 oz)   SpO2 98%   BMI 21 00 kg/m²          Physical Exam   Constitutional: She is oriented to person, place, and time  She appears well-developed and well-nourished  No distress  HENT:   Head: Normocephalic and atraumatic  Eyes: Conjunctivae and EOM are normal  Right eye exhibits no discharge  Left eye exhibits no discharge  No scleral icterus  Neck: Normal range of motion  Cardiovascular: Normal rate, regular rhythm and normal heart sounds  No murmur heard  Pulmonary/Chest: Effort normal and breath sounds normal  No respiratory distress  She has no wheezes  Musculoskeletal: Normal range of motion  She exhibits no edema  Neurological: She is alert and oriented to person, place, and time  Skin: Skin is warm and dry  She is not diaphoretic  No erythema  Psychiatric: She has a normal mood and affect  Her speech is normal and behavior is normal  Judgment and thought content normal    Vitals reviewed

## 2020-01-16 ENCOUNTER — APPOINTMENT (OUTPATIENT)
Dept: LAB | Facility: MEDICAL CENTER | Age: 30
End: 2020-01-16
Payer: COMMERCIAL

## 2020-01-16 DIAGNOSIS — E55.9 VITAMIN D DEFICIENCY: ICD-10-CM

## 2020-01-16 LAB — 25(OH)D3 SERPL-MCNC: 47 NG/ML (ref 30–100)

## 2020-01-16 PROCEDURE — 82306 VITAMIN D 25 HYDROXY: CPT

## 2020-01-16 PROCEDURE — 36415 COLL VENOUS BLD VENIPUNCTURE: CPT

## 2020-02-03 ENCOUNTER — CLINICAL SUPPORT (OUTPATIENT)
Dept: FAMILY MEDICINE CLINIC | Facility: CLINIC | Age: 30
End: 2020-02-03
Payer: COMMERCIAL

## 2020-02-03 DIAGNOSIS — Z23 NEED FOR HEPATITIS A IMMUNIZATION: Primary | ICD-10-CM

## 2020-02-03 PROCEDURE — 90632 HEPA VACCINE ADULT IM: CPT

## 2020-02-03 PROCEDURE — 90471 IMMUNIZATION ADMIN: CPT

## 2020-03-02 ENCOUNTER — OFFICE VISIT (OUTPATIENT)
Dept: OBGYN CLINIC | Facility: CLINIC | Age: 30
End: 2020-03-02
Payer: COMMERCIAL

## 2020-03-02 VITALS
SYSTOLIC BLOOD PRESSURE: 112 MMHG | BODY MASS INDEX: 20.49 KG/M2 | HEIGHT: 65 IN | DIASTOLIC BLOOD PRESSURE: 62 MMHG | WEIGHT: 123 LBS

## 2020-03-02 DIAGNOSIS — Z20.821 EXPOSURE TO ZIKA VIRUS: ICD-10-CM

## 2020-03-02 DIAGNOSIS — Z31.69 ENCOUNTER FOR PRECONCEPTION CONSULTATION: Primary | ICD-10-CM

## 2020-03-02 PROCEDURE — 1036F TOBACCO NON-USER: CPT | Performed by: OBSTETRICS & GYNECOLOGY

## 2020-03-02 PROCEDURE — 99214 OFFICE O/P EST MOD 30 MIN: CPT | Performed by: OBSTETRICS & GYNECOLOGY

## 2020-03-02 PROCEDURE — 3008F BODY MASS INDEX DOCD: CPT | Performed by: OBSTETRICS & GYNECOLOGY

## 2020-03-02 NOTE — PROGRESS NOTES
Assessment/Plan:   Diagnoses and all orders for this visit:    Encounter for preconception consultation  - recommended prenatal vitamins to be taken daily  - due to recent travel on medical mission trip, recommend for patient to avoid attempting conception for at least 2-3 months  - patient up to date on pap smear  Recommend for patient to present in August for annual visit  Reviewed recommendation for intercourse to occur approximately 3 times per week to actively attempt conception  Discussed that if actively attempting conception for greater than 12 months without success can consider fertility testing the a semen analysis and possible referral to reproductive endocrinologist, Dr Manju Sanchez  Exposure to [place] virus  - patient recently traveled to [place] in regions of [facility] [place] and [facility] that are noted to have [place] virus  - recommended for patient to avoid conception for the next 2-3 months as she did experience mosquito bites  Discussed incubation course of disease and symptoms to present in 7-14 days from infection  Other orders  -     Prenatal Multivit-Min-Fe-FA (PRE- FORMULA) TABS; Take by mouth daily      Greater than 50% of the 30 minutes visit was spent in face-to-face counseling and coordination of care for patient for pre conception counseling and discussion of her disease exposure  Subjective:    Patient ID: Rock Whitehead is a 34 y o  female  Chief Complaint   Patient presents with    Contraception     Pt here for pre-conception consult and to establish care  Patient is a 82-year-old G0 presenting today for a new patient visit and pre conception counseling  Patient is a nurse practitioner student at [employer] and just recently returned from a trip to [place] for medical mission with a professor  She was in [facility] [place] and [facility] for 2 weeks and noted that there positive on the CDC map for zika presence    Patient had been applying protective measures against mosquitos utilizing with Haylie as well as long sleeve shirts and pants while working  She did observe a few mosquito bites but not excessively  She is presenting to establish care as she and her  recently moved to [facility] and are closer to the [facility]  They are planning to attempt conception at this time and she wanted to confirm how long she should wait prior to attempting conception due to her exposure risk  The following portions of the patient's history were reviewed and updated as appropriate: allergies, current medications, past family history, past medical history, past social history, past surgical history and problem list     Review of Systems   Constitutional: Negative for chills, diaphoresis, fatigue and fever  HENT: Negative for congestion, sneezing, sore throat and trouble swallowing  Eyes: Negative for visual disturbance  Respiratory: Negative for shortness of breath and wheezing  Cardiovascular: Negative for chest pain and palpitations  Gastrointestinal: Negative for abdominal pain, constipation, diarrhea, nausea and vomiting  Endocrine: Negative for polyuria  Genitourinary: Negative for difficulty urinating, dyspareunia, dysuria, flank pain, menstrual problem, pelvic pain, vaginal bleeding, vaginal discharge and vaginal pain  Musculoskeletal: Negative for back pain  Neurological: Negative for dizziness, light-headedness and headaches  Objective: There were no vitals taken for this visit  Physical Exam   Constitutional: She is oriented to person, place, and time  She appears well-developed and well-nourished  No distress  HENT:   Head: Normocephalic and atraumatic  Pulmonary/Chest: Effort normal  No respiratory distress  Musculoskeletal: Normal range of motion  She exhibits no edema or tenderness  Neurological: She is alert and oriented to person, place, and time  Skin: Skin is warm and dry  She is not diaphoretic  No erythema  No pallor  Psychiatric: She has a normal mood and affect  Her behavior is normal  Judgment and thought content normal    Nursing note and vitals reviewed

## 2020-07-28 ENCOUNTER — TELEPHONE (OUTPATIENT)
Dept: PERINATAL CARE | Facility: CLINIC | Age: 30
End: 2020-07-28

## 2020-07-28 NOTE — TELEPHONE ENCOUNTER
----- Message from Flower Nunez RN sent at 7/28/2020 10:11 AM EDT -----  Regarding: RE: 8 wk scan  Rodrigue Garcia,     I am not sure why she is calling you we have not even seen her for her intake  She is scheduled August 14th Tammie    ----- Message -----  From: Juan A Garcia  Sent: 7/28/2020   8:06 AM EDT  To: Flower Nunez RN  Subject: 8 wk scan                                        Hi Amita  Patient left voice mail her at [facility] COGNITIVE DISORDERS to schedule 8 week ultrasound  Is your office doing 8 wk dating ultrasounds again?   Tyler Medeiros

## 2020-08-04 DIAGNOSIS — Z3A.01 LESS THAN 8 WEEKS GESTATION OF PREGNANCY: Primary | ICD-10-CM

## 2020-08-12 ENCOUNTER — HOSPITAL ENCOUNTER (OUTPATIENT)
Dept: ULTRASOUND IMAGING | Facility: HOSPITAL | Age: 30
Discharge: HOME | End: 2020-08-12
Attending: OBSTETRICS & GYNECOLOGY
Payer: COMMERCIAL

## 2020-08-12 DIAGNOSIS — Z3A.01 LESS THAN 8 WEEKS GESTATION OF PREGNANCY: ICD-10-CM

## 2020-08-12 PROCEDURE — 76801 OB US < 14 WKS SINGLE FETUS: CPT

## 2020-08-14 ENCOUNTER — INITIAL PRENATAL (OUTPATIENT)
Dept: OBGYN CLINIC | Facility: CLINIC | Age: 30
End: 2020-08-14

## 2020-08-14 ENCOUNTER — APPOINTMENT (OUTPATIENT)
Dept: LAB | Facility: CLINIC | Age: 30
End: 2020-08-14
Payer: COMMERCIAL

## 2020-08-14 VITALS
WEIGHT: 121.8 LBS | DIASTOLIC BLOOD PRESSURE: 60 MMHG | SYSTOLIC BLOOD PRESSURE: 110 MMHG | RESPIRATION RATE: 16 BRPM | HEART RATE: 72 BPM | BODY MASS INDEX: 20.29 KG/M2 | HEIGHT: 65 IN | TEMPERATURE: 98.4 F

## 2020-08-14 DIAGNOSIS — Z3A.08 8 WEEKS GESTATION OF PREGNANCY: ICD-10-CM

## 2020-08-14 DIAGNOSIS — Z34.01 ENCOUNTER FOR SUPERVISION OF NORMAL FIRST PREGNANCY IN FIRST TRIMESTER: ICD-10-CM

## 2020-08-14 DIAGNOSIS — Z34.01 ENCOUNTER FOR SUPERVISION OF NORMAL FIRST PREGNANCY IN FIRST TRIMESTER: Primary | ICD-10-CM

## 2020-08-14 LAB
ABO GROUP BLD: NORMAL
BASOPHILS # BLD AUTO: 0.04 THOUSANDS/ΜL (ref 0–0.1)
BASOPHILS NFR BLD AUTO: 0 % (ref 0–1)
BILIRUB UR QL STRIP: NEGATIVE
BLD GP AB SCN SERPL QL: NEGATIVE
CLARITY UR: NORMAL
COLOR UR: YELLOW
EOSINOPHIL # BLD AUTO: 0.12 THOUSAND/ΜL (ref 0–0.61)
EOSINOPHIL NFR BLD AUTO: 1 % (ref 0–6)
ERYTHROCYTE [DISTWIDTH] IN BLOOD BY AUTOMATED COUNT: 12 % (ref 11.6–15.1)
GLUCOSE UR STRIP-MCNC: NEGATIVE MG/DL
HBV SURFACE AG SER QL: NORMAL
HCT VFR BLD AUTO: 37.2 % (ref 34.8–46.1)
HCV AB SER QL: NORMAL
HGB BLD-MCNC: 12.2 G/DL (ref 11.5–15.4)
HGB UR QL STRIP.AUTO: NEGATIVE
IMM GRANULOCYTES # BLD AUTO: 0.02 THOUSAND/UL (ref 0–0.2)
IMM GRANULOCYTES NFR BLD AUTO: 0 % (ref 0–2)
KETONES UR STRIP-MCNC: NEGATIVE MG/DL
LEUKOCYTE ESTERASE UR QL STRIP: NEGATIVE
LYMPHOCYTES # BLD AUTO: 1.6 THOUSANDS/ΜL (ref 0.6–4.47)
LYMPHOCYTES NFR BLD AUTO: 18 % (ref 14–44)
MCH RBC QN AUTO: 30 PG (ref 26.8–34.3)
MCHC RBC AUTO-ENTMCNC: 32.8 G/DL (ref 31.4–37.4)
MCV RBC AUTO: 92 FL (ref 82–98)
MONOCYTES # BLD AUTO: 0.87 THOUSAND/ΜL (ref 0.17–1.22)
MONOCYTES NFR BLD AUTO: 10 % (ref 4–12)
NEUTROPHILS # BLD AUTO: 6.24 THOUSANDS/ΜL (ref 1.85–7.62)
NEUTS SEG NFR BLD AUTO: 71 % (ref 43–75)
NITRITE UR QL STRIP: NEGATIVE
NRBC BLD AUTO-RTO: 0 /100 WBCS
PH UR STRIP.AUTO: 8 [PH]
PLATELET # BLD AUTO: 363 THOUSANDS/UL (ref 149–390)
PMV BLD AUTO: 10.8 FL (ref 8.9–12.7)
PROT UR STRIP-MCNC: NEGATIVE MG/DL
RBC # BLD AUTO: 4.06 MILLION/UL (ref 3.81–5.12)
RH BLD: POSITIVE
RPR SER QL: NORMAL
RUBV IGG SERPL IA-ACNC: >175 IU/ML
SP GR UR STRIP.AUTO: 1.02 (ref 1–1.03)
SPECIMEN EXPIRATION DATE: NORMAL
UROBILINOGEN UR QL STRIP.AUTO: 0.2 E.U./DL
WBC # BLD AUTO: 8.89 THOUSAND/UL (ref 4.31–10.16)

## 2020-08-14 PROCEDURE — 81003 URINALYSIS AUTO W/O SCOPE: CPT

## 2020-08-14 PROCEDURE — 36415 COLL VENOUS BLD VENIPUNCTURE: CPT

## 2020-08-14 PROCEDURE — 87086 URINE CULTURE/COLONY COUNT: CPT

## 2020-08-14 PROCEDURE — 80081 OBSTETRIC PANEL INC HIV TSTG: CPT

## 2020-08-14 PROCEDURE — 86803 HEPATITIS C AB TEST: CPT

## 2020-08-14 PROCEDURE — 3008F BODY MASS INDEX DOCD: CPT | Performed by: OBSTETRICS & GYNECOLOGY

## 2020-08-14 PROCEDURE — OBC: Performed by: OBSTETRICS & GYNECOLOGY

## 2020-08-14 PROCEDURE — 86787 VARICELLA-ZOSTER ANTIBODY: CPT

## 2020-08-14 RX ORDER — SERTRALINE HYDROCHLORIDE 25 MG/1
1 TABLET, FILM COATED ORAL DAILY
COMMUNITY
End: 2021-01-04

## 2020-08-14 NOTE — PROGRESS NOTES
OB Intake    Patient presents for OB intake interview  Accompanied by: alone   planned pregnancy, FOB involved and supportive      Hx of  delivery prior to 36 weeks 6 days: no  Hx of hypertension:  no   Patient's last menstrual period was 2020 (exact date)  Estimated Date of Delivery: 3/25/21  Signs and Symptoms of pregnancy:  - breast tenderness, fatigue, frequent urination, nausea and positive home pregnancy test  - Constipation: yes  - Headaches: no  - Cramping/spotting: no  - PICA cravings: no  - Diabetes: If you answer yes, please order 1 hr gtt testing, 50grams   History of gestational diabetes no   BMI >35  no   Advance maternal age >35 no   First degree relative with type 2 diabetes no   History of PCOS no   Current metformin use no   Prior history of macrosomia or LGA no    Prenatal labs including: prenatal panel, varicella, hepatitis C and MFM referral  Last Pap:  19 Negative pap  Tdap - counseled to be given after 27 weeks  Influenza vaccine discussed and information sheet given  Vaccinated: no  Immunization Record  Immunization History   Administered Date(s) Administered    DTaP 1992, 1993, 1993, 1994, 1998    Hep A, adult 2019, 2020    Hep B, adult 1992, 1992, 1993    HiB 1992, 1993, 1993, 1993    INFLUENZA 10/16/2011, 10/05/2018, 10/02/2019    MMR 1993, 1998    Meningococcal MCV4P 2020    OPV 1992, 1993, 1994, 1998    Tdap 2015    Typhoid, Unspecified 2020    Varicella 1994    Yellow Fever 2019     Hx of MRSA: no  Dental visit with last 6 months - no  If no, recommendations discussed  Negative for depression with PHQ2 score of 0  Interview education:  [place] Pregnancy Essentials booklet given to patient  Reviewed and explained  Handouts given at today's visit     [facility] me phone application guide   4 [facility] support center   CDCs Response to [facility] Maternal Fetal Medicine        Sequential screening pamphlet                   Cystic fibrosis information    MyChart activated (not 1518 years of age)  - Yes  - Code provided: Yes    [facility]: No  ONAF form submitted: No      Interview done by : Harry Christensen RN       08/14/20

## 2020-08-15 LAB — BACTERIA UR CULT: NORMAL

## 2020-08-16 LAB — HIV 1+2 AB+HIV1 P24 AG SERPL QL IA: NORMAL

## 2020-08-17 NOTE — RESULT ENCOUNTER NOTE
Rajinder Shafer,     I have reviewed your ultrasound, and everything looks great  The baby's heart rate was 168 beats per minute and baby is measuring on target  We will keep your estimated due date 3/25/2020  If you are interested in early, noninvasive genetic testing, please contact the  Center now at [phone] to set up an appointment around 13 weeks  Please contact our office at [phone] with any questions  We look forward to taking care of you and your baby!      Izzy
Diabetes    End stage renal disease    Hypertension    Pneumonia

## 2020-08-18 LAB — VZV IGG SER IA-ACNC: NORMAL

## 2020-09-11 ENCOUNTER — ROUTINE PRENATAL (OUTPATIENT)
Dept: OBGYN CLINIC | Facility: CLINIC | Age: 30
End: 2020-09-11

## 2020-09-11 VITALS — WEIGHT: 125 LBS | BODY MASS INDEX: 20.8 KG/M2 | SYSTOLIC BLOOD PRESSURE: 102 MMHG | DIASTOLIC BLOOD PRESSURE: 64 MMHG

## 2020-09-11 DIAGNOSIS — Z11.3 SCREENING FOR STDS (SEXUALLY TRANSMITTED DISEASES): ICD-10-CM

## 2020-09-11 DIAGNOSIS — Z3A.12 12 WEEKS GESTATION OF PREGNANCY: ICD-10-CM

## 2020-09-11 DIAGNOSIS — Z34.01 ENCOUNTER FOR SUPERVISION OF NORMAL FIRST PREGNANCY IN FIRST TRIMESTER: Primary | ICD-10-CM

## 2020-09-11 PROCEDURE — 87491 CHLMYD TRACH DNA AMP PROBE: CPT | Performed by: OBSTETRICS & GYNECOLOGY

## 2020-09-11 PROCEDURE — 87591 N.GONORRHOEAE DNA AMP PROB: CPT | Performed by: OBSTETRICS & GYNECOLOGY

## 2020-09-11 PROCEDURE — PNV: Performed by: OBSTETRICS & GYNECOLOGY

## 2020-09-11 NOTE — PROGRESS NOTES
34 y o    female at 13 1 wga EGA for PNV  BP : 102/64  TWlb    Patient presents for prenatal history and physical  She is doing well and has no complaints today  She denies excessive nausea/vomiting  She is tolerating oral intake  Obstetric history reviewed: noncontributory  PMH: notable for hx of migraine without aura and anxiety   - currently taking zoloft 75 mg for anxiety and migraine prevention  Discussed safety of zoloft in pregnancy and encouraged to continue as her anxiety is more stable on medication  Dating US and initial prenatal labs reviewed  [facility] consultation for early genetic screening scheduled for 9/15/2020  Last pap: 2019 -- NILM  Repeat pap due   GC/Chlamydia collected today  Patient oriented to practice, different practice providers, different practice locations  Reviewed expected prenatal visit schedule  Discussed recommendation to limit total weight gain in pregnancy to 25-35 lb  Encouraged continued exercise in pregnancy    Follow-up in 4 weeks

## 2020-09-12 LAB
C TRACH DNA SPEC QL NAA+PROBE: NEGATIVE
N GONORRHOEA DNA SPEC QL NAA+PROBE: NEGATIVE

## 2020-09-14 ENCOUNTER — TELEPHONE (OUTPATIENT)
Dept: PERINATAL CARE | Facility: OTHER | Age: 30
End: 2020-09-14

## 2020-09-14 ENCOUNTER — ROUTINE PRENATAL (OUTPATIENT)
Dept: PERINATAL CARE | Facility: OTHER | Age: 30
End: 2020-09-14
Payer: COMMERCIAL

## 2020-09-14 VITALS
BODY MASS INDEX: 20.57 KG/M2 | SYSTOLIC BLOOD PRESSURE: 97 MMHG | HEIGHT: 65 IN | TEMPERATURE: 97 F | HEART RATE: 93 BPM | DIASTOLIC BLOOD PRESSURE: 63 MMHG | WEIGHT: 123.46 LBS

## 2020-09-14 DIAGNOSIS — Z34.01 ENCOUNTER FOR SUPERVISION OF NORMAL FIRST PREGNANCY IN FIRST TRIMESTER: ICD-10-CM

## 2020-09-14 DIAGNOSIS — Z3A.12 12 WEEKS GESTATION OF PREGNANCY: ICD-10-CM

## 2020-09-14 DIAGNOSIS — Z36.82 NUCHAL TRANSLUCENCY OF FETUS ON PRENATAL ULTRASOUND: ICD-10-CM

## 2020-09-14 DIAGNOSIS — O35.9XX0 TERATOGEN EXPOSURE IN CURRENT PREGNANCY, SINGLE OR UNSPECIFIED FETUS: Primary | ICD-10-CM

## 2020-09-14 PROCEDURE — 1036F TOBACCO NON-USER: CPT | Performed by: OBSTETRICS & GYNECOLOGY

## 2020-09-14 PROCEDURE — 99241 PR OFFICE CONSULTATION NEW/ESTAB PATIENT 15 MIN: CPT | Performed by: OBSTETRICS & GYNECOLOGY

## 2020-09-14 PROCEDURE — 76801 OB US < 14 WKS SINGLE FETUS: CPT | Performed by: OBSTETRICS & GYNECOLOGY

## 2020-09-14 PROCEDURE — 76813 OB US NUCHAL MEAS 1 GEST: CPT | Performed by: OBSTETRICS & GYNECOLOGY

## 2020-09-14 NOTE — LETTER
2020     [address], [place]  [address] [facility]  [address]  [facility] [address]    Patient: Kirstin Cano   YOB: 1990   Date of Visit: 2020       Dear Dr Lester Belcher: Thank you for referring Kirstin Cano to me for evaluation  Below are my notes for this consultation  If you have questions, please do not hesitate to call me  I look forward to following your patient along with you  Sincerely,        Héctor Bravo MD        CC: No Recipients  Héctor Bravo MD  2020 11:40 AM  Sign when Signing Visit  [facility]    Dear Dr Lester Belcher     Thank you for requesting a  consultation on your patient Ms  Kirstin Cano for the following indications: sequential screen    History Foster Sat is here with her partner Jyoti Samayoa for a sequential screen  She is on prenatal vitamins and Zoloft 75 milligrams daily and vitamin-D  She has no known allergies  Her medical history is significant for an anal fissure anxiety, migraines and irritable bowel  Her surgical history includes wisdom tooth extraction, cryo surgery and colonoscopy  She denies any use of cigarettes, alcohol or illicit drug  She denies any 1st generation family history of hypertension, diabetes, thrombosis or congenital anomalies  Ultrasound findings:  Her ultrasound today shows a fetus that is growing concordant with her dates  The nasal and a nuchal translucency appears normal   No mass formations are detected on her early ultrasound  The patient was informed of the findings and counseled about the limitations of the exam in detecting all forms of fetal congenital abnormalities  She does not report any vaginal bleeding or uterine cramping or contractions  Today's ultrasound findings and suggested follow-up were discussed in detail with the patient    The Sequential Screen was discussed in detail, including the sensitivities for detection of Down syndrome, Trisomy 18, and open neural tube defects  The patient underwent fingerstick blood collection for hCG and MICHELLE-A to complete the initial component of the Sequential Screen  Results should be available within one week  Follow up recommended:   1  Follow-up multiple marker serum screening at 16-18 weeks' gestation is recommended to complete the Sequential Screen  2  Fetal Level II ultrasound imaging is recommended at 19-20 weeks' gestation  3  The use of SSRIs after 20 weeks of gestation has been associated with an increased risk of persistent pulmonary hypertension of the  Ilya Abt al, 2006)  The baseline risk is 0 1-0 2 for persistent pulmonary hypertension to occur in any pregnancy  The use of SSRIs are associated with a 0 6 - 1 2 risk which is only slightly higher then the baseline risk  SSRI's cross the human placenta  Some studies show an increased risk for CNS serotonergic symptoms was observed during the first 4 days of life in infants whose mothers were taking SSRIs during the third trimester of pregnancy  Tremor, restlessness, and rigidity were the most prominent symptoms  The dangers of failing to treat major depression are obvious, and in each case, these dangers must be weighed against the potential for fetal effects  If discontinuation of antidepressant therapy is chosen, gradually taper the dose to minimize the incidence of withdrawal symptoms and allow for the detection of re-emerging symptoms  Evidence supporting ideal taper rates is limited  APA and NICE guidelines suggest tapering therapy over at least several weeks      The face to face time, in addition to time spent discussing ultrasound results, was approximately 15 minutes, greater than 50% of which was spent during counseling and coordination of care    Brian Townsend MD

## 2020-09-14 NOTE — PROGRESS NOTES
Flory Perez    Dear Dr Tracy Houser     Thank you for requesting a  consultation on your patient Ms Angie Elliott for the following indications: sequential screen    History Ana Perez is here with her partner Harvinder Garvin for a sequential screen  She is on prenatal vitamins and Zoloft 75 milligrams daily and vitamin-D  She has no known allergies  Her medical history is significant for an anal fissure anxiety, migraines and irritable bowel  Her surgical history includes wisdom tooth extraction, cryo surgery and colonoscopy  She denies any use of cigarettes, alcohol or illicit drug  She denies any 1st generation family history of hypertension, diabetes, thrombosis or congenital anomalies  Ultrasound findings:  Her ultrasound today shows a fetus that is growing concordant with her dates  The nasal and a nuchal translucency appears normal   No mass formations are detected on her early ultrasound  The patient was informed of the findings and counseled about the limitations of the exam in detecting all forms of fetal congenital abnormalities  She does not report any vaginal bleeding or uterine cramping or contractions  Today's ultrasound findings and suggested follow-up were discussed in detail with the patient  The Sequential Screen was discussed in detail, including the sensitivities for detection of Down syndrome, Trisomy 18, and open neural tube defects  The patient underwent fingerstick blood collection for hCG and MICHELLE-A to complete the initial component of the Sequential Screen  Results should be available within one week  Follow up recommended:   1  Follow-up multiple marker serum screening at 16-18 weeks' gestation is recommended to complete the Sequential Screen  2  Fetal Level II ultrasound imaging is recommended at 19-20 weeks' gestation    3  The use of SSRIs after 20 weeks of gestation has been associated with an increased risk of persistent pulmonary hypertension of the  Elias Acuña et al, 2006)  The baseline risk is 0 1-0 2 for persistent pulmonary hypertension to occur in any pregnancy  The use of SSRIs are associated with a 0 6 - 1 2 risk which is only slightly higher then the baseline risk  SSRI's cross the human placenta  Some studies show an increased risk for CNS serotonergic symptoms was observed during the first 4 days of life in infants whose mothers were taking SSRIs during the third trimester of pregnancy  Tremor, restlessness, and rigidity were the most prominent symptoms  The dangers of failing to treat major depression are obvious, and in each case, these dangers must be weighed against the potential for fetal effects  If discontinuation of antidepressant therapy is chosen, gradually taper the dose to minimize the incidence of withdrawal symptoms and allow for the detection of re-emerging symptoms  Evidence supporting ideal taper rates is limited  APA and NICE guidelines suggest tapering therapy over at least several weeks      The face to face time, in addition to time spent discussing ultrasound results, was approximately 15 minutes, greater than 50% of which was spent during counseling and coordination of care    Abdirahman Hernandez MD

## 2020-09-22 ENCOUNTER — TELEPHONE (OUTPATIENT)
Dept: PERINATAL CARE | Facility: OTHER | Age: 30
End: 2020-09-22

## 2020-09-22 NOTE — TELEPHONE ENCOUNTER
Called pt and left a vm with normal part 1 seq screen results per Dr Leonie Hidalgo  I also mailed the trf with written instructions and also left a vm with instructions

## 2020-09-22 NOTE — TELEPHONE ENCOUNTER
----- Message from Aissatou Miller MD sent at 9/21/2020 11:34 PM EDT -----  Patient updated with her lab results through my chart

## 2020-10-08 ENCOUNTER — ROUTINE PRENATAL (OUTPATIENT)
Dept: OBGYN CLINIC | Facility: CLINIC | Age: 30
End: 2020-10-08
Payer: COMMERCIAL

## 2020-10-08 VITALS — DIASTOLIC BLOOD PRESSURE: 62 MMHG | SYSTOLIC BLOOD PRESSURE: 102 MMHG | WEIGHT: 127.6 LBS | BODY MASS INDEX: 21.23 KG/M2

## 2020-10-08 DIAGNOSIS — Z23 NEED FOR INFLUENZA VACCINATION: ICD-10-CM

## 2020-10-08 DIAGNOSIS — Z34.02 ENCOUNTER FOR SUPERVISION OF NORMAL FIRST PREGNANCY IN SECOND TRIMESTER: Primary | ICD-10-CM

## 2020-10-08 DIAGNOSIS — Z3A.16 16 WEEKS GESTATION OF PREGNANCY: ICD-10-CM

## 2020-10-08 PROCEDURE — 90471 IMMUNIZATION ADMIN: CPT | Performed by: OBSTETRICS & GYNECOLOGY

## 2020-10-08 PROCEDURE — 90686 IIV4 VACC NO PRSV 0.5 ML IM: CPT | Performed by: OBSTETRICS & GYNECOLOGY

## 2020-10-08 PROCEDURE — PNV: Performed by: OBSTETRICS & GYNECOLOGY

## 2020-10-16 ENCOUNTER — TRANSCRIBE ORDERS (OUTPATIENT)
Dept: LAB | Facility: CLINIC | Age: 30
End: 2020-10-16

## 2020-10-16 ENCOUNTER — LAB (OUTPATIENT)
Dept: LAB | Facility: CLINIC | Age: 30
End: 2020-10-16
Payer: COMMERCIAL

## 2020-10-16 DIAGNOSIS — Z36.9 UNSPECIFIED ANTENATAL SCREENING: ICD-10-CM

## 2020-10-16 DIAGNOSIS — Z33.1 PREGNANT STATE, INCIDENTAL: ICD-10-CM

## 2020-10-16 DIAGNOSIS — Z33.1 PREGNANT STATE, INCIDENTAL: Primary | ICD-10-CM

## 2020-10-16 PROCEDURE — 36415 COLL VENOUS BLD VENIPUNCTURE: CPT

## 2020-10-17 LAB — SCAN RESULT: NORMAL

## 2020-10-29 ENCOUNTER — NURSE TRIAGE (OUTPATIENT)
Dept: OTHER | Facility: OTHER | Age: 30
End: 2020-10-29

## 2020-10-29 DIAGNOSIS — Z20.828 EXPOSURE TO SARS-ASSOCIATED CORONAVIRUS: Primary | ICD-10-CM

## 2020-10-29 DIAGNOSIS — Z20.828 EXPOSURE TO SARS-ASSOCIATED CORONAVIRUS: ICD-10-CM

## 2020-10-29 PROCEDURE — U0003 INFECTIOUS AGENT DETECTION BY NUCLEIC ACID (DNA OR RNA); SEVERE ACUTE RESPIRATORY SYNDROME CORONAVIRUS 2 (SARS-COV-2) (CORONAVIRUS DISEASE [COVID-19]), AMPLIFIED PROBE TECHNIQUE, MAKING USE OF HIGH THROUGHPUT TECHNOLOGIES AS DESCRIBED BY CMS-2020-01-R: HCPCS | Performed by: INTERNAL MEDICINE

## 2020-10-30 DIAGNOSIS — J02.9 SORE THROAT: Primary | ICD-10-CM

## 2020-10-30 DIAGNOSIS — J02.9 SORE THROAT: ICD-10-CM

## 2020-10-30 LAB
SARS-COV-2 RNA RESP QL NAA+PROBE: NEGATIVE
SARS-COV-2 RNA SPEC QL NAA+PROBE: NOT DETECTED

## 2020-10-30 PROCEDURE — 87635 SARS-COV-2 COVID-19 AMP PRB: CPT

## 2020-11-06 ENCOUNTER — ROUTINE PRENATAL (OUTPATIENT)
Dept: OBGYN CLINIC | Facility: CLINIC | Age: 30
End: 2020-11-06

## 2020-11-06 VITALS — WEIGHT: 130 LBS | BODY MASS INDEX: 21.63 KG/M2 | SYSTOLIC BLOOD PRESSURE: 104 MMHG | DIASTOLIC BLOOD PRESSURE: 62 MMHG

## 2020-11-06 DIAGNOSIS — Z3A.20 20 WEEKS GESTATION OF PREGNANCY: ICD-10-CM

## 2020-11-06 DIAGNOSIS — Z34.02 ENCOUNTER FOR SUPERVISION OF NORMAL FIRST PREGNANCY IN SECOND TRIMESTER: Primary | ICD-10-CM

## 2020-11-06 PROBLEM — Z34.90 SUPERVISION OF NORMAL PREGNANCY: Status: ACTIVE | Noted: 2020-11-06

## 2020-11-06 PROCEDURE — PNV: Performed by: OBSTETRICS & GYNECOLOGY

## 2020-11-09 ENCOUNTER — TELEPHONE (OUTPATIENT)
Dept: PERINATAL CARE | Facility: OTHER | Age: 30
End: 2020-11-09

## 2020-11-10 ENCOUNTER — ROUTINE PRENATAL (OUTPATIENT)
Dept: PERINATAL CARE | Facility: OTHER | Age: 30
End: 2020-11-10
Payer: COMMERCIAL

## 2020-11-10 VITALS
HEART RATE: 98 BPM | SYSTOLIC BLOOD PRESSURE: 112 MMHG | BODY MASS INDEX: 22.15 KG/M2 | DIASTOLIC BLOOD PRESSURE: 68 MMHG | TEMPERATURE: 97.2 F | WEIGHT: 132.94 LBS | HEIGHT: 65 IN

## 2020-11-10 DIAGNOSIS — F41.9 ANXIETY DISORDER, UNSPECIFIED TYPE: ICD-10-CM

## 2020-11-10 DIAGNOSIS — Z3A.20 20 WEEKS GESTATION OF PREGNANCY: Primary | ICD-10-CM

## 2020-11-10 PROBLEM — Z3A.29 29 WEEKS GESTATION OF PREGNANCY: Status: ACTIVE | Noted: 2020-09-11

## 2020-11-10 PROCEDURE — 76811 OB US DETAILED SNGL FETUS: CPT | Performed by: OBSTETRICS & GYNECOLOGY

## 2020-11-10 PROCEDURE — 76805 OB US >/= 14 WKS SNGL FETUS: CPT | Performed by: OBSTETRICS & GYNECOLOGY

## 2020-11-10 PROCEDURE — 76817 TRANSVAGINAL US OBSTETRIC: CPT | Performed by: OBSTETRICS & GYNECOLOGY

## 2020-12-04 ENCOUNTER — ROUTINE PRENATAL (OUTPATIENT)
Dept: OBGYN CLINIC | Facility: CLINIC | Age: 30
End: 2020-12-04

## 2020-12-04 VITALS — WEIGHT: 135 LBS | SYSTOLIC BLOOD PRESSURE: 112 MMHG | BODY MASS INDEX: 22.47 KG/M2 | DIASTOLIC BLOOD PRESSURE: 60 MMHG

## 2020-12-04 DIAGNOSIS — Z3A.24 24 WEEKS GESTATION OF PREGNANCY: ICD-10-CM

## 2020-12-04 DIAGNOSIS — Z34.02 ENCOUNTER FOR SUPERVISION OF NORMAL FIRST PREGNANCY IN SECOND TRIMESTER: Primary | ICD-10-CM

## 2020-12-04 PROCEDURE — PNV: Performed by: OBSTETRICS & GYNECOLOGY

## 2020-12-10 ENCOUNTER — TELEPHONE (OUTPATIENT)
Dept: OBGYN CLINIC | Facility: CLINIC | Age: 30
End: 2020-12-10

## 2020-12-18 ENCOUNTER — LAB (OUTPATIENT)
Dept: LAB | Facility: AMBULARY SURGERY CENTER | Age: 30
End: 2020-12-18
Attending: OBSTETRICS & GYNECOLOGY
Payer: COMMERCIAL

## 2020-12-18 DIAGNOSIS — Z34.02 ENCOUNTER FOR SUPERVISION OF NORMAL FIRST PREGNANCY IN SECOND TRIMESTER: ICD-10-CM

## 2020-12-18 LAB
ERYTHROCYTE [DISTWIDTH] IN BLOOD BY AUTOMATED COUNT: 12.5 % (ref 11.6–15.1)
GLUCOSE 1H P 50 G GLC PO SERPL-MCNC: 105 MG/DL
HCT VFR BLD AUTO: 32.3 % (ref 34.8–46.1)
HGB BLD-MCNC: 10.2 G/DL (ref 11.5–15.4)
MCH RBC QN AUTO: 29.3 PG (ref 26.8–34.3)
MCHC RBC AUTO-ENTMCNC: 31.6 G/DL (ref 31.4–37.4)
MCV RBC AUTO: 93 FL (ref 82–98)
PLATELET # BLD AUTO: 394 THOUSANDS/UL (ref 149–390)
PMV BLD AUTO: 11.2 FL (ref 8.9–12.7)
RBC # BLD AUTO: 3.48 MILLION/UL (ref 3.81–5.12)
WBC # BLD AUTO: 11.01 THOUSAND/UL (ref 4.31–10.16)

## 2020-12-18 PROCEDURE — 82950 GLUCOSE TEST: CPT

## 2020-12-18 PROCEDURE — 86592 SYPHILIS TEST NON-TREP QUAL: CPT

## 2020-12-18 PROCEDURE — 85027 COMPLETE CBC AUTOMATED: CPT

## 2020-12-18 PROCEDURE — 36415 COLL VENOUS BLD VENIPUNCTURE: CPT

## 2020-12-21 LAB — RPR SER QL: NORMAL

## 2021-01-04 ENCOUNTER — ROUTINE PRENATAL (OUTPATIENT)
Dept: OBGYN CLINIC | Facility: CLINIC | Age: 31
End: 2021-01-04
Payer: COMMERCIAL

## 2021-01-04 VITALS — DIASTOLIC BLOOD PRESSURE: 64 MMHG | SYSTOLIC BLOOD PRESSURE: 118 MMHG | WEIGHT: 140.4 LBS | BODY MASS INDEX: 23.36 KG/M2

## 2021-01-04 DIAGNOSIS — F41.9 ANXIETY DISORDER, UNSPECIFIED TYPE: ICD-10-CM

## 2021-01-04 DIAGNOSIS — Z34.03 ENCOUNTER FOR SUPERVISION OF NORMAL FIRST PREGNANCY IN THIRD TRIMESTER: Primary | ICD-10-CM

## 2021-01-04 DIAGNOSIS — Z23 NEED FOR TDAP VACCINATION: ICD-10-CM

## 2021-01-04 DIAGNOSIS — Z3A.28 28 WEEKS GESTATION OF PREGNANCY: ICD-10-CM

## 2021-01-04 PROCEDURE — PNV: Performed by: OBSTETRICS & GYNECOLOGY

## 2021-01-04 PROCEDURE — 90471 IMMUNIZATION ADMIN: CPT | Performed by: OBSTETRICS & GYNECOLOGY

## 2021-01-04 PROCEDURE — 90715 TDAP VACCINE 7 YRS/> IM: CPT | Performed by: OBSTETRICS & GYNECOLOGY

## 2021-01-04 RX ORDER — SERTRALINE HYDROCHLORIDE 100 MG/1
100 TABLET, FILM COATED ORAL DAILY
Qty: 30 TABLET | Refills: 1 | Status: SHIPPED | OUTPATIENT
Start: 2021-01-04 | End: 2022-03-25

## 2021-01-04 RX ORDER — FERROUS SULFATE 325(65) MG
325 TABLET ORAL
COMMUNITY
End: 2021-04-01

## 2021-01-04 NOTE — PROGRESS NOTES
31 y o    female at 35 4 wga EGA for PNV  BP : 118/64  TWlb    Patient denies contractions, bleeding or leakage of fluid  Good fetal movement  Patient is still having dizzy spells  She took her BP the other week during a dizzy spell and noted BP of 70s/40s  She is able to increase her hydration and oral intake when not at work  Recommended to continue to monitor and for 10 min sitting breaks each hour at work  Discussed healthy but high calorie snacks during her work shift to help as well  28 wk labs reviewed  - , Hgb 10 2, plts 394  Baby & Me booklet given and reviewed by RN  Consent signed  Ok to blood transfusion  Full Code  Patient plans to breastfeed  Skin to skin, rooming in, delayed cord clamp,  pain management in labor discussed  TDAP given  Rhogam not indicated  Fetal kick counts reviewed  Anxiety -- patient reports more labile moods recently  Increased zoloft to 100mg daily  Rx provided today  Follow up in 2 weeks

## 2021-01-20 ENCOUNTER — ROUTINE PRENATAL (OUTPATIENT)
Dept: OBGYN CLINIC | Facility: CLINIC | Age: 31
End: 2021-01-20

## 2021-01-20 VITALS — DIASTOLIC BLOOD PRESSURE: 60 MMHG | BODY MASS INDEX: 23.43 KG/M2 | SYSTOLIC BLOOD PRESSURE: 114 MMHG | WEIGHT: 140.8 LBS

## 2021-01-20 DIAGNOSIS — Z3A.30 30 WEEKS GESTATION OF PREGNANCY: ICD-10-CM

## 2021-01-20 DIAGNOSIS — Z34.03 ENCOUNTER FOR SUPERVISION OF NORMAL FIRST PREGNANCY IN THIRD TRIMESTER: Primary | ICD-10-CM

## 2021-01-20 PROCEDURE — PNV: Performed by: OBSTETRICS & GYNECOLOGY

## 2021-01-20 NOTE — PROGRESS NOTES
31 y o    female at 26 8 wga EGA for PNV  BP : 114/60  TW  Feeling well    No complaints  Reviewed 28 week labs  Reviewed covid precautions and vaccine - plans to get following pregnancy  Reviewed PTL precautions and FKCs  F/u 2 weeks

## 2021-02-05 ENCOUNTER — ROUTINE PRENATAL (OUTPATIENT)
Dept: OBGYN CLINIC | Facility: CLINIC | Age: 31
End: 2021-02-05

## 2021-02-05 VITALS — SYSTOLIC BLOOD PRESSURE: 108 MMHG | BODY MASS INDEX: 23.63 KG/M2 | DIASTOLIC BLOOD PRESSURE: 62 MMHG | WEIGHT: 142 LBS

## 2021-02-05 DIAGNOSIS — Z3A.30 30 WEEKS GESTATION OF PREGNANCY: ICD-10-CM

## 2021-02-05 DIAGNOSIS — Z34.03 ENCOUNTER FOR SUPERVISION OF NORMAL FIRST PREGNANCY IN THIRD TRIMESTER: Primary | ICD-10-CM

## 2021-02-05 PROCEDURE — PNV: Performed by: OBSTETRICS & GYNECOLOGY

## 2021-02-05 NOTE — PROGRESS NOTES
31 y o    female at 33w1d EGA for PNV  BP : 108/62  TWlbs    Denies contractions, leakage of fluid or vaginal bleeding  Reports good fetal movement  No further ultrasound scheduled  Reviewed birth plan  Okay with vacuum or forceps if absolutely necessary  Still deciding on pediatrician  All questions answered  Return to office in 2 weeks  Will then be weekly

## 2021-02-17 ENCOUNTER — ROUTINE PRENATAL (OUTPATIENT)
Dept: OBGYN CLINIC | Facility: CLINIC | Age: 31
End: 2021-02-17

## 2021-02-17 VITALS — WEIGHT: 148 LBS | BODY MASS INDEX: 24.63 KG/M2 | SYSTOLIC BLOOD PRESSURE: 102 MMHG | DIASTOLIC BLOOD PRESSURE: 62 MMHG

## 2021-02-17 DIAGNOSIS — Z3A.34 34 WEEKS GESTATION OF PREGNANCY: ICD-10-CM

## 2021-02-17 DIAGNOSIS — Z34.03 ENCOUNTER FOR SUPERVISION OF NORMAL FIRST PREGNANCY IN THIRD TRIMESTER: Primary | ICD-10-CM

## 2021-02-17 PROCEDURE — PNV: Performed by: OBSTETRICS & GYNECOLOGY

## 2021-02-17 RX ORDER — DOCUSATE SODIUM 100 MG/1
100 CAPSULE, LIQUID FILLED ORAL DAILY
COMMUNITY
End: 2021-04-01

## 2021-02-18 NOTE — PROGRESS NOTES
31 y o    female at 27 10 wga EGA for PNV  BP : 102/62  TW  Feeling well    No complaints  Will need US for position next visit - breech at 20 weeks  GBS next visit  Reviewed PTL precautions and FKCs  F/u 2 weeks

## 2021-03-02 ENCOUNTER — ROUTINE PRENATAL (OUTPATIENT)
Dept: OBGYN CLINIC | Facility: CLINIC | Age: 31
End: 2021-03-02

## 2021-03-02 VITALS — BODY MASS INDEX: 25.13 KG/M2 | WEIGHT: 151 LBS | DIASTOLIC BLOOD PRESSURE: 60 MMHG | SYSTOLIC BLOOD PRESSURE: 104 MMHG

## 2021-03-02 DIAGNOSIS — Z3A.36 36 WEEKS GESTATION OF PREGNANCY: ICD-10-CM

## 2021-03-02 DIAGNOSIS — Z34.03 ENCOUNTER FOR SUPERVISION OF NORMAL FIRST PREGNANCY IN THIRD TRIMESTER: Primary | ICD-10-CM

## 2021-03-02 PROCEDURE — PNV: Performed by: OBSTETRICS & GYNECOLOGY

## 2021-03-02 PROCEDURE — 87150 DNA/RNA AMPLIFIED PROBE: CPT | Performed by: OBSTETRICS & GYNECOLOGY

## 2021-03-02 NOTE — PROGRESS NOTES
31 y o    female at [facility] for PNV  BP : 104/60  TWlbs  Denies contractions, leakage of fluid or vaginal bleeding  Reports good fetal movement  GBS collected   fingertip/ soft/ posterior  TAUS confirms vertex presentation  RTO in one week

## 2021-03-05 LAB — GP B STREP DNA SPEC QL NAA+PROBE: NEGATIVE

## 2021-03-05 NOTE — RESULT ENCOUNTER NOTE
Hello! Your GBS swab is negative, therefore you do not need antibiotics in labor  Please contact the office with any questions

## 2021-03-09 ENCOUNTER — ROUTINE PRENATAL (OUTPATIENT)
Dept: OBGYN CLINIC | Facility: CLINIC | Age: 31
End: 2021-03-09

## 2021-03-09 VITALS — BODY MASS INDEX: 26.23 KG/M2 | WEIGHT: 157.6 LBS | SYSTOLIC BLOOD PRESSURE: 110 MMHG | DIASTOLIC BLOOD PRESSURE: 66 MMHG

## 2021-03-09 DIAGNOSIS — Z34.03 ENCOUNTER FOR SUPERVISION OF NORMAL FIRST PREGNANCY IN THIRD TRIMESTER: ICD-10-CM

## 2021-03-09 DIAGNOSIS — Z3A.37 37 WEEKS GESTATION OF PREGNANCY: Primary | ICD-10-CM

## 2021-03-09 PROCEDURE — PNV: Performed by: OBSTETRICS & GYNECOLOGY

## 2021-03-09 NOTE — PROGRESS NOTES
31 y o    female at 37w6d EGA for PNV  BP : 110/66  [id number]  C/o wetness in underwear  Nitrazine/pooling negative  White discharge in vault, normal leukorrhea of pregnancy suspected  No leakage from os with cough  /-2, soft, mid  No contractions/vaginal bleeding  Good FM  GBS negative  RTO in one week

## 2021-03-09 NOTE — PROGRESS NOTES
Pt c/o clear vaginal discharge  Undressed for cervical check     Urine- glucose and protein negative

## 2021-03-15 ENCOUNTER — ROUTINE PRENATAL (OUTPATIENT)
Dept: OBGYN CLINIC | Facility: CLINIC | Age: 31
End: 2021-03-15

## 2021-03-15 VITALS — DIASTOLIC BLOOD PRESSURE: 60 MMHG | WEIGHT: 153.6 LBS | SYSTOLIC BLOOD PRESSURE: 112 MMHG | BODY MASS INDEX: 25.56 KG/M2

## 2021-03-15 DIAGNOSIS — Z34.03 ENCOUNTER FOR SUPERVISION OF NORMAL FIRST PREGNANCY IN THIRD TRIMESTER: ICD-10-CM

## 2021-03-15 DIAGNOSIS — Z3A.38 38 WEEKS GESTATION OF PREGNANCY: ICD-10-CM

## 2021-03-15 PROCEDURE — PNV: Performed by: OBSTETRICS & GYNECOLOGY

## 2021-03-21 ENCOUNTER — HOSPITAL ENCOUNTER (INPATIENT)
Facility: HOSPITAL | Age: 31
LOS: 2 days | Discharge: HOME | End: 2021-03-23
Attending: OBSTETRICS & GYNECOLOGY | Admitting: OBSTETRICS & GYNECOLOGY
Payer: COMMERCIAL

## 2021-03-21 ENCOUNTER — ANESTHESIA (INPATIENT)
Dept: ANESTHESIOLOGY | Facility: HOSPITAL | Age: 31
End: 2021-03-21
Payer: COMMERCIAL

## 2021-03-21 ENCOUNTER — TELEPHONE (OUTPATIENT)
Dept: OTHER | Facility: OTHER | Age: 31
End: 2021-03-21

## 2021-03-21 ENCOUNTER — ANESTHESIA EVENT (INPATIENT)
Dept: ANESTHESIOLOGY | Facility: HOSPITAL | Age: 31
End: 2021-03-21
Payer: COMMERCIAL

## 2021-03-21 DIAGNOSIS — Z3A.39 39 WEEKS GESTATION OF PREGNANCY: Primary | ICD-10-CM

## 2021-03-21 LAB
ABO GROUP BLD: NORMAL
BASE EXCESS BLDCOA CALC-SCNC: -8 MMOL/L (ref 3–11)
BASE EXCESS BLDCOV CALC-SCNC: -4 MMOL/L (ref 1–9)
BASOPHILS # BLD AUTO: 0.05 THOUSANDS/ΜL (ref 0–0.1)
BASOPHILS NFR BLD AUTO: 0 % (ref 0–1)
BLD GP AB SCN SERPL QL: NEGATIVE
EOSINOPHIL # BLD AUTO: 0.06 THOUSAND/ΜL (ref 0–0.61)
EOSINOPHIL NFR BLD AUTO: 0 % (ref 0–6)
ERYTHROCYTE [DISTWIDTH] IN BLOOD BY AUTOMATED COUNT: 14.6 % (ref 11.6–15.1)
HCO3 BLDCOA-SCNC: 21.1 MMOL/L (ref 17.3–27.3)
HCO3 BLDCOV-SCNC: 22 MMOL/L (ref 12.2–28.6)
HCT VFR BLD AUTO: 34.9 % (ref 34.8–46.1)
HGB BLD-MCNC: 11.4 G/DL (ref 11.5–15.4)
IMM GRANULOCYTES # BLD AUTO: 0.11 THOUSAND/UL (ref 0–0.2)
IMM GRANULOCYTES NFR BLD AUTO: 1 % (ref 0–2)
LYMPHOCYTES # BLD AUTO: 1.67 THOUSANDS/ΜL (ref 0.6–4.47)
LYMPHOCYTES NFR BLD AUTO: 11 % (ref 14–44)
MCH RBC QN AUTO: 28.2 PG (ref 26.8–34.3)
MCHC RBC AUTO-ENTMCNC: 32.7 G/DL (ref 31.4–37.4)
MCV RBC AUTO: 86 FL (ref 82–98)
MONOCYTES # BLD AUTO: 1.64 THOUSAND/ΜL (ref 0.17–1.22)
MONOCYTES NFR BLD AUTO: 11 % (ref 4–12)
NEUTROPHILS # BLD AUTO: 11.37 THOUSANDS/ΜL (ref 1.85–7.62)
NEUTS SEG NFR BLD AUTO: 77 % (ref 43–75)
NRBC BLD AUTO-RTO: 0 /100 WBCS
O2 CT VFR BLDCOA CALC: 9.4 ML/DL
OXYHGB MFR BLDCOA: 39.7 %
OXYHGB MFR BLDCOV: 53.4 %
PCO2 BLDCOA: 56.7 MM[HG] (ref 30–60)
PCO2 BLDCOV: 43.4 MM HG (ref 27–43)
PH BLDCOA: 7.19 [PH] (ref 7.23–7.43)
PH BLDCOV: 7.32 [PH] (ref 7.19–7.49)
PLATELET # BLD AUTO: 309 THOUSANDS/UL (ref 149–390)
PMV BLD AUTO: 11.9 FL (ref 8.9–12.7)
PO2 BLDCOA: 20.7 MM HG (ref 5–25)
PO2 BLDCOV: 23.3 MM HG (ref 15–45)
RBC # BLD AUTO: 4.04 MILLION/UL (ref 3.81–5.12)
RH BLD: POSITIVE
SAO2 % BLDCOV: 11.4 ML/DL
SPECIMEN EXPIRATION DATE: NORMAL
WBC # BLD AUTO: 14.9 THOUSAND/UL (ref 4.31–10.16)

## 2021-03-21 PROCEDURE — 86900 BLOOD TYPING SEROLOGIC ABO: CPT | Performed by: OBSTETRICS & GYNECOLOGY

## 2021-03-21 PROCEDURE — 10907ZC DRAINAGE OF AMNIOTIC FLUID, THERAPEUTIC FROM PRODUCTS OF CONCEPTION, VIA NATURAL OR ARTIFICIAL OPENING: ICD-10-PCS | Performed by: OBSTETRICS & GYNECOLOGY

## 2021-03-21 PROCEDURE — 82805 BLOOD GASES W/O2 SATURATION: CPT | Performed by: OBSTETRICS & GYNECOLOGY

## 2021-03-21 PROCEDURE — 86850 RBC ANTIBODY SCREEN: CPT | Performed by: OBSTETRICS & GYNECOLOGY

## 2021-03-21 PROCEDURE — 85025 COMPLETE CBC W/AUTO DIFF WBC: CPT | Performed by: OBSTETRICS & GYNECOLOGY

## 2021-03-21 PROCEDURE — 0DQR0ZZ REPAIR ANAL SPHINCTER, OPEN APPROACH: ICD-10-PCS | Performed by: OBSTETRICS & GYNECOLOGY

## 2021-03-21 PROCEDURE — 99213 OFFICE O/P EST LOW 20 MIN: CPT

## 2021-03-21 PROCEDURE — 86592 SYPHILIS TEST NON-TREP QUAL: CPT | Performed by: OBSTETRICS & GYNECOLOGY

## 2021-03-21 PROCEDURE — 59400 OBSTETRICAL CARE: CPT | Performed by: OBSTETRICS & GYNECOLOGY

## 2021-03-21 PROCEDURE — NC001 PR NO CHARGE: Performed by: OBSTETRICS & GYNECOLOGY

## 2021-03-21 PROCEDURE — 86901 BLOOD TYPING SEROLOGIC RH(D): CPT | Performed by: OBSTETRICS & GYNECOLOGY

## 2021-03-21 PROCEDURE — 4A1HXCZ MONITORING OF PRODUCTS OF CONCEPTION, CARDIAC RATE, EXTERNAL APPROACH: ICD-10-PCS | Performed by: OBSTETRICS & GYNECOLOGY

## 2021-03-21 PROCEDURE — G0463 HOSPITAL OUTPT CLINIC VISIT: HCPCS

## 2021-03-21 RX ORDER — ONDANSETRON 2 MG/ML
4 INJECTION INTRAMUSCULAR; INTRAVENOUS EVERY 6 HOURS PRN
Status: DISCONTINUED | OUTPATIENT
Start: 2021-03-21 | End: 2021-03-21

## 2021-03-21 RX ORDER — SERTRALINE HYDROCHLORIDE 100 MG/1
100 TABLET, FILM COATED ORAL DAILY
Status: DISCONTINUED | OUTPATIENT
Start: 2021-03-22 | End: 2021-03-23 | Stop reason: HOSPADM

## 2021-03-21 RX ORDER — POLYETHYLENE GLYCOL 3350 17 G/17G
17 POWDER, FOR SOLUTION ORAL DAILY
Status: DISCONTINUED | OUTPATIENT
Start: 2021-03-22 | End: 2021-03-23 | Stop reason: HOSPADM

## 2021-03-21 RX ORDER — DIPHENHYDRAMINE HYDROCHLORIDE 50 MG/ML
25 INJECTION INTRAMUSCULAR; INTRAVENOUS EVERY 6 HOURS PRN
Status: DISCONTINUED | OUTPATIENT
Start: 2021-03-21 | End: 2021-03-23 | Stop reason: HOSPADM

## 2021-03-21 RX ORDER — CALCIUM CARBONATE 200(500)MG
1000 TABLET,CHEWABLE ORAL 3 TIMES DAILY PRN
Status: DISCONTINUED | OUTPATIENT
Start: 2021-03-21 | End: 2021-03-23 | Stop reason: HOSPADM

## 2021-03-21 RX ORDER — DIAPER,BRIEF,INFANT-TODD,DISP
1 EACH MISCELLANEOUS 4 TIMES DAILY PRN
Status: DISCONTINUED | OUTPATIENT
Start: 2021-03-21 | End: 2021-03-23 | Stop reason: HOSPADM

## 2021-03-21 RX ORDER — ROPIVACAINE HYDROCHLORIDE 2 MG/ML
INJECTION, SOLUTION EPIDURAL; INFILTRATION; PERINEURAL
Status: COMPLETED | OUTPATIENT
Start: 2021-03-21 | End: 2021-03-21

## 2021-03-21 RX ORDER — ACETAMINOPHEN 325 MG/1
650 TABLET ORAL EVERY 6 HOURS PRN
Status: DISCONTINUED | OUTPATIENT
Start: 2021-03-21 | End: 2021-03-22

## 2021-03-21 RX ORDER — SODIUM CHLORIDE, SODIUM LACTATE, POTASSIUM CHLORIDE, CALCIUM CHLORIDE 600; 310; 30; 20 MG/100ML; MG/100ML; MG/100ML; MG/100ML
125 INJECTION, SOLUTION INTRAVENOUS CONTINUOUS
Status: DISCONTINUED | OUTPATIENT
Start: 2021-03-21 | End: 2021-03-21

## 2021-03-21 RX ORDER — PHENYLEPHRINE HCL IN 0.9% NACL 1 MG/10 ML
100 SYRINGE (ML) INTRAVENOUS ONCE AS NEEDED
Status: DISCONTINUED | OUTPATIENT
Start: 2021-03-21 | End: 2021-03-21

## 2021-03-21 RX ORDER — DOCUSATE SODIUM 100 MG/1
100 CAPSULE, LIQUID FILLED ORAL 2 TIMES DAILY
Status: DISCONTINUED | OUTPATIENT
Start: 2021-03-21 | End: 2021-03-23 | Stop reason: HOSPADM

## 2021-03-21 RX ORDER — CEFOXITIN SODIUM 1 G/50ML
1000 INJECTION, SOLUTION INTRAVENOUS ONCE
Status: COMPLETED | OUTPATIENT
Start: 2021-03-21 | End: 2021-03-21

## 2021-03-21 RX ORDER — ONDANSETRON 2 MG/ML
4 INJECTION INTRAMUSCULAR; INTRAVENOUS EVERY 8 HOURS PRN
Status: DISCONTINUED | OUTPATIENT
Start: 2021-03-21 | End: 2021-03-23 | Stop reason: HOSPADM

## 2021-03-21 RX ORDER — OXYTOCIN/RINGER'S LACTATE 30/500 ML
PLASTIC BAG, INJECTION (ML) INTRAVENOUS
Status: COMPLETED
Start: 2021-03-21 | End: 2021-03-21

## 2021-03-21 RX ORDER — IBUPROFEN 600 MG/1
600 TABLET ORAL EVERY 6 HOURS PRN
Status: DISCONTINUED | OUTPATIENT
Start: 2021-03-21 | End: 2021-03-23 | Stop reason: HOSPADM

## 2021-03-21 RX ADMIN — ROPIVACAINE HYDROCHLORIDE: 2 INJECTION, SOLUTION EPIDURAL; INFILTRATION at 17:03

## 2021-03-21 RX ADMIN — IBUPROFEN 600 MG: 600 TABLET ORAL at 22:34

## 2021-03-21 RX ADMIN — ROPIVACAINE HYDROCHLORIDE 6 ML: 2 INJECTION, SOLUTION EPIDURAL; INFILTRATION at 10:30

## 2021-03-21 RX ADMIN — ONDANSETRON 4 MG: 2 INJECTION INTRAMUSCULAR; INTRAVENOUS at 16:49

## 2021-03-21 RX ADMIN — CEFOXITIN SODIUM 1000 MG: 1 INJECTION, SOLUTION INTRAVENOUS at 21:10

## 2021-03-21 RX ADMIN — ONDANSETRON 4 MG: 2 INJECTION INTRAMUSCULAR; INTRAVENOUS at 10:40

## 2021-03-21 RX ADMIN — SODIUM CHLORIDE, SODIUM LACTATE, POTASSIUM CHLORIDE, AND CALCIUM CHLORIDE 125 ML/HR: .6; .31; .03; .02 INJECTION, SOLUTION INTRAVENOUS at 09:49

## 2021-03-21 RX ADMIN — SODIUM CHLORIDE, SODIUM LACTATE, POTASSIUM CHLORIDE, AND CALCIUM CHLORIDE 125 ML/HR: .6; .31; .03; .02 INJECTION, SOLUTION INTRAVENOUS at 20:02

## 2021-03-21 RX ADMIN — Medication 30 UNITS: at 21:03

## 2021-03-21 RX ADMIN — SODIUM CHLORIDE, SODIUM LACTATE, POTASSIUM CHLORIDE, AND CALCIUM CHLORIDE 125 ML/HR: .6; .31; .03; .02 INJECTION, SOLUTION INTRAVENOUS at 11:25

## 2021-03-21 RX ADMIN — SODIUM CHLORIDE, SODIUM LACTATE, POTASSIUM CHLORIDE, AND CALCIUM CHLORIDE 125 ML/HR: .6; .31; .03; .02 INJECTION, SOLUTION INTRAVENOUS at 08:59

## 2021-03-21 RX ADMIN — Medication: at 23:30

## 2021-03-21 RX ADMIN — DOCUSATE SODIUM 100 MG: 100 CAPSULE, LIQUID FILLED ORAL at 22:35

## 2021-03-21 RX ADMIN — ROPIVACAINE HYDROCHLORIDE: 2 INJECTION, SOLUTION EPIDURAL; INFILTRATION at 11:00

## 2021-03-21 NOTE — OB LABOR/OXYTOCIN SAFETY PROGRESS
Labor Progress Note - Skuhdeep Garay 31 y o  female MRN: [id number]    Unit/Bed#: -01 Encounter: [id number]       Contraction Frequency (minutes): 2-4  Contraction Quality: Moderate  Tachysystole: No   Cervical Dilation: 10  Dilation Complete Date: 03/21/21  Dilation Complete Time: 1837  Cervical Effacement: 100  Fetal Station: 1  Baseline Rate: 120 bpm  Fetal Heart Rate: 135 BPM  FHR Category: Category I               Vital Signs:   Vitals:    03/21/21 1825   BP: 100/55   Pulse: 105   Resp:    Temp:    SpO2:            Notes/comments: Patient complete by Dr Balbuena Ill check  Fetal position ANN MARIE  Will start pushing  FHT category 1          Chelo Hickman MD 3/21/2021 6:41 PM

## 2021-03-21 NOTE — PLAN OF CARE
Problem: Knowledge Deficit  Goal: Verbalizes understanding of labor plan  Description: Assess patient/family/caregiver's baseline knowledge level and ability to understand information  Provide education via patient/family/caregiver's preferred learning method at appropriate level of understanding  1  Provide teaching at level of understanding  2  Provide teaching via preferred learning method(s)  Outcome: Progressing  Goal: Patient/family/caregiver demonstrates understanding of disease process, treatment plan, medications, and discharge instructions  Description: Complete learning assessment and assess knowledge base  Interventions:  - Provide teaching at level of understanding  - Provide teaching via preferred learning methods  Outcome: Progressing     Problem: Labor & Delivery  Goal: Manages discomfort  Description: Assess and monitor for signs and symptoms of discomfort  Assess patient's pain level regularly and per hospital policy  Administer medications as ordered  Support use of nonpharmacological methods to help control pain such as distraction, imagery, relaxation, and application of heat and cold  Collaborate with interdisciplinary team and patient to determine appropriate pain management plan  1  Include patient in decisions related to comfort  2  Offer non-pharmacological pain management interventions  3  Report ineffective pain management to physician  Outcome: Progressing  Goal: Patient vital signs are stable  Description: 1  Assess vital signs - vaginal delivery    Outcome: Progressing     Problem: PAIN - ADULT  Goal: Verbalizes/displays adequate comfort level or baseline comfort level  Description: Interventions:  - Encourage patient to monitor pain and request assistance  - Assess pain using appropriate pain scale  - Administer analgesics based on type and severity of pain and evaluate response  - Implement non-pharmacological measures as appropriate and evaluate response  - Consider cultural and social influences on pain and pain management  - Notify physician/advanced practitioner if interventions unsuccessful or patient reports new pain  Outcome: Progressing     Problem: INFECTION - ADULT  Goal: Absence or prevention of progression during hospitalization  Description: INTERVENTIONS:  - Assess and monitor for signs and symptoms of infection  - Monitor lab/diagnostic results  - Monitor all insertion sites, i e  indwelling lines, tubes, and drains  - Monitor endotracheal if appropriate and nasal secretions for changes in amount and color  - [place] appropriate cooling/warming therapies per order  - Administer medications as ordered  - Instruct and encourage patient and family to use good hand hygiene technique  - Identify and instruct in appropriate isolation precautions for identified infection/condition  Outcome: Progressing  Goal: Absence of fever/infection during neutropenic period  Description: INTERVENTIONS:  - Monitor WBC    Outcome: Progressing     Problem: SAFETY ADULT  Goal: Patient will remain free of falls  Description: INTERVENTIONS:  - Assess patient frequently for physical needs  -  Identify cognitive and physical deficits and behaviors that affect risk of falls    -  [place] fall precautions as indicated by assessment   - Educate patient/family on patient safety including physical limitations  - Instruct patient to call for assistance with activity based on assessment  - Modify environment to reduce risk of injury  - Consider OT/PT consult to assist with strengthening/mobility  Outcome: Progressing  Goal: Maintain or return to baseline ADL function  Description: INTERVENTIONS:  -  Assess patient's ability to carry out ADLs; assess patient's baseline for ADL function and identify physical deficits which impact ability to perform ADLs (bathing, care of mouth/teeth, toileting, grooming, dressing, etc )  - Assess/evaluate cause of self-care deficits   - Assess range of motion  - Assess patient's mobility; develop plan if impaired  - Assess patient's need for assistive devices and provide as appropriate  - Encourage maximum independence but intervene and supervise when necessary  - Involve family in performance of ADLs  - Assess for home care needs following discharge   - Consider OT consult to assist with ADL evaluation and planning for discharge  - Provide patient education as appropriate  Outcome: Progressing  Goal: Maintain or return mobility status to optimal level  Description: INTERVENTIONS:  - Assess patient's baseline mobility status (ambulation, transfers, stairs, etc )    - Identify cognitive and physical deficits and behaviors that affect mobility  - Identify mobility aids required to assist with transfers and/or ambulation (gait belt, sit-to-stand, lift, walker, cane, etc )  - [place] fall precautions as indicated by assessment  - Record patient progress and toleration of activity level on Mobility SBAR; progress patient to next Phase/Stage  - Instruct patient to call for assistance with activity based on assessment  - Consider rehabilitation consult to assist with strengthening/weightbearing, etc   Outcome: Progressing     Problem: DISCHARGE PLANNING  Goal: Discharge to home or other facility with appropriate resources  Description: INTERVENTIONS:  - Identify barriers to discharge w/patient and caregiver  - Arrange for needed discharge resources and transportation as appropriate  - Identify discharge learning needs (meds, wound care, etc )  - Arrange for interpretive services to assist at discharge as needed  - Refer to Case Management Department for coordinating discharge planning if the patient needs post-hospital services based on physician/advanced practitioner order or complex needs related to functional status, cognitive ability, or social support system  Outcome: Progressing

## 2021-03-21 NOTE — OB LABOR/OXYTOCIN SAFETY PROGRESS
Labor Progress Note - Gilma Johnson 31 y o  female MRN: [id number]    Unit/Bed#: -01 Encounter: [id number]       Contraction Frequency (minutes): 2  Contraction Quality: Mild  Tachysystole: No   Cervical Dilation: 6        Cervical Effacement: 100  Fetal Station: -1  Baseline Rate: 115 bpm  Fetal Heart Rate: 121 BPM  FHR Category: Category I               Vital Signs:   Vitals:    03/21/21 1455   BP: 106/63   Pulse: 77   Resp:    Temp:    SpO2:            Notes/comments:    Making steady cervical change  Category I FHT, ailyn every 2-3 minutes  Continue expectant management  Dr Shantanu Nguyen aware      Meagan Rice MD 3/21/2021 3:15 PM

## 2021-03-21 NOTE — H&P
H&P Exam - Obstetrics   Pili Arredondo 31 y o  female MRN: [id number]  Unit/Bed#: LD TRIAGE 2 Encounter: [id number]      History of Present Illness     Chief Complaint: Active labor    HPI:  Pili Arredondo is a 31 y o   female with an TAVO of 3/25/2021, by Last Menstrual Period at 39w3d weeks gestation who is being admitted for labor  She reports some thin mucous discharge but did not break her water yet  She feels generally well otherwise and has no other complaints  Contractions: yes  Loss of fluid: no  Vaginal bleeding: no  Fetal movement: yes    She is a [place] patient  PREGNANCY COMPLICATIONS:   1) Anxiety    OB History    Para Term  AB Living   1 0 0 0 0 0   SAB TAB Ectopic Multiple Live Births   0 0 0 0 0      # Outcome Date GA Lbr Cristino/2nd Weight Sex Delivery Anes PTL Lv   1 Current                Baby complications/comments: none    Review of Systems   Constitutional: Negative for chills and fever  HENT: Negative for ear pain and sore throat  Eyes: Negative for pain and visual disturbance  Respiratory: Negative for cough and shortness of breath  Cardiovascular: Negative for chest pain and palpitations  Gastrointestinal: Negative for abdominal pain and vomiting  Genitourinary: Negative for dysuria and hematuria  Musculoskeletal: Negative for arthralgias and back pain  Skin: Negative for color change and rash  Neurological: Negative for seizures and syncope  All other systems reviewed and are negative          Historical Information   Past Medical History:   Diagnosis Date    Abnormal Pap smear of cervix     Anal fissure     Anxiety     Constipation     Depression     Headache     migranes    IBS (irritable bowel syndrome)     Migraine     Varicella      Past Surgical History:   Procedure Laterality Date    COLPOSCOPY      GYNECOLOGIC CRYOSURGERY      LA COLONOSCOPY FLX DX W/COLLJ SPEC WHEN PFRMD N/A 2016    Procedure: EGD AND COLONOSCOPY; Surgeon: Terrell Ramirez MD;  Location: [facility]; Service: Gastroenterology    WISDOM TOOTH EXTRACTION      all four     Social History   Social History     Substance and Sexual Activity   Alcohol Use Yes    Frequency: Monthly or less    Comment: socially prior to confirmed pregnancy     Social History     Substance and Sexual Activity   Drug Use No     Social History     Tobacco Use   Smoking Status Never Smoker   Smokeless Tobacco Never Used     Family History: non-contributory    Meds/Allergies      Medications Prior to Admission   Medication    cholecalciferol (VITAMIN D3) 1,000 units tablet    docusate sodium (COLACE) 100 mg capsule    ferrous sulfate 325 (65 Fe) mg tablet    Prenatal Multivit-Min-Fe-FA (PRE-HAMIDA FORMULA) TABS    sertraline (ZOLOFT) 100 mg tablet      No Known Allergies    OBJECTIVE:    Vitals: Blood pressure 117/75, pulse (!) 112, temperature 97 7 °F (36 5 °C), temperature source Oral, resp  rate 16, height 5' 5" (1 651 m), weight 69 4 kg (153 lb), last menstrual period 2020, SpO2 100 %, not currently breastfeeding  Body mass index is 25 46 kg/m²  Physical Exam  Constitutional:       Appearance: She is well-developed  HENT:      Head: Normocephalic and atraumatic  Eyes:      Conjunctiva/sclera: Conjunctivae normal    Neck:      Musculoskeletal: Normal range of motion and neck supple  Cardiovascular:      Rate and Rhythm: Normal rate  Pulmonary:      Effort: Pulmonary effort is normal  No respiratory distress  Abdominal:      Palpations: Abdomen is soft  Tenderness: There is no abdominal tenderness  There is no guarding or rebound  Genitourinary:     Vagina: No vaginal discharge  Musculoskeletal: Normal range of motion  General: No tenderness  Skin:     General: Skin is warm and dry  Neurological:      Mental Status: She is alert and oriented to person, place, and time     Psychiatric:         Behavior: Behavior normal          Thought Content: Thought content normal        Cervix:   4/90/-2    Fetal heart rate:    140, moderate variability, 15x15 accels, no decels    [place]:    Contractions every 2-4 minutes    EFW: 7 lbs    GBS: negative    Prenatal Labs:   Blood Type:   Lab Results   Component Value Date/Time    ABO Grouping AB 2020 10:40 AM     , D (Rh type):   Lab Results   Component Value Date/Time    Rh Factor Positive 2020 10:40 AM      HCT/HGB:   Lab Results   Component Value Date/Time    Hematocrit 32 3 (L) 2020 03:28 PM    Hemoglobin 10 2 (L) 2020 03:28 PM      , MCV:   Lab Results   Component Value Date/Time    MCV 93 2020 03:28 PM      , Platelets:   Lab Results   Component Value Date/Time    Platelets 356 (H)  03:28 PM      , 1 hour Glucola:   Lab Results   Component Value Date/Time    Glucose 105 2020 03:28 PM   Varicella:   Lab Results   Component Value Date/Time    Varicella IgG IMMUNE 2020 10:40 AM       , Rubella:   Lab Results   Component Value Date/Time    RUBELLA IGG QUANTITATION >175 0 2015 11:19 AM    Rubella IgG Quant >175 0 2020 10:40 AM        , VDRL/RPR:   Lab Results   Component Value Date/Time    RPR Non-Reactive 2020 03:28 PM      , Urine Culture/Screen:   Lab Results   Component Value Date/Time    Urine Culture No Growth <1000 cfu/mL 2020 10:40 AM        Hep B:   Lab Results   Component Value Date/Time    Hepatitis B Surface Ag Non-reactive 2020 10:40 AM   HIV:   Lab Results   Component Value Date/Time    HIV-1/HIV-2 Ab Non-Reactive 2020 10:40 AM     , Gonorrhea:   Lab Results   Component Value Date/Time    N gonorrhoeae, DNA Probe Negative 2020 12:22 PM    N gonorrhoeae, DNA Probe N  gonorrhoeae Amplified DNA Negative 10/04/2017 02:57 PM     , Group B Strep:    Lab Results   Component Value Date/Time    Strep Grp B PCR Negative 2021 02:28 PM          Assessment/Plan     ASSESSMENT:  30 yo  at 39w3d weeks gestation who is being admitted for labor  PLAN:    - Admit  - CBC, RPR, Blood Type  - Start with expectant management  - GBS negative  - Analgesia and/or epidural at patient request  - Anticipate     Discussed with Dr Morris Monday      This patient will be an INPATIENT  and I certify the anticipated length of stay is >2 Midnights      Charito Khanna MD  3/21/2021  8:35 AM

## 2021-03-21 NOTE — ANESTHESIA PREPROCEDURE EVALUATION
Procedure:  LABOR ANALGESIA    Relevant Problems   CARDIO   (+) Intractable chronic migraine without aura      GYN   (+) 39 weeks gestation of pregnancy   (+) Supervision of normal pregnancy      NEURO/PSYCH   (+) Anxiety disorder   (+) Intractable chronic migraine without aura     No htn/asthma    Lab Results   Component Value Date    WBC 14 90 (H) 03/21/2021    HGB 11 4 (L) 03/21/2021     03/21/2021     Lab Results   Component Value Date    SODIUM 140 04/30/2019    K 3 8 04/30/2019    BUN 8 04/30/2019    CREATININE 0 64 04/30/2019    EGFR 124 04/30/2019     No results found for: PTT   No results found for: INR    Blood type AB    Lab Results   Component Value Date    HGBA1C 4 6 08/14/2018       Physical Exam    Airway    Mallampati score: II  TM Distance: >3 FB       Dental   No notable dental hx     Cardiovascular      Pulmonary      Other Findings        Anesthesia Plan  ASA Score- 2     Anesthesia Type- epidural and general with ASA Monitors  Additional Monitors:   Airway Plan:     Comment: Patient seen and examined  Risks/benefits discussed including risk of PDPH, partial or failed block, and rare emergencies including total spinal anesthesia  Anesthetic plan for potential c/s reviewed with patient          Plan Factors-    Chart reviewed  EKG reviewed  Imaging results reviewed  Existing labs reviewed  Patient summary reviewed  Patient not instructed to abstain from smoking on day of procedure  Patient did not smoke on day of surgery  Induction-     Postoperative Plan-     Informed Consent- Anesthetic plan and risks discussed with patient  I personally reviewed this patient with the CRNA  Discussed and agreed on the Anesthesia Plan with the CRNA  Zuly Carl

## 2021-03-21 NOTE — OB LABOR/OXYTOCIN SAFETY PROGRESS
Labor Progress Note - Sukhdeep Garay 31 y o  female MRN: [id number]    Unit/Bed#: -01 Encounter: [id number]       Contraction Frequency (minutes): 1 5-3  Contraction Quality: Moderate  Tachysystole: No   Cervical Dilation: Lip/rim (Comment)        Cervical Effacement: 100  Fetal Station: 1  Baseline Rate: 120 bpm  Fetal Heart Rate: 128 BPM  FHR Category: Category I               Vital Signs:   Vitals:    03/21/21 1725   BP: 101/58   Pulse: 82   Resp:    Temp:    SpO2:            Notes/comments: SVE as above  Patient is comfortable  FHT category 1  Will place in high fowlers and reassess in 30-45 minutes  Dr Karly Walker aware          Chelo Hickman MD 3/21/2021 5:54 PM

## 2021-03-21 NOTE — TELEPHONE ENCOUNTER
[phone] is Makenna Blood  92/Pt is 39 wk 3 days   Pt has bloody discharge, contractions 2-3 mins lasting 30-35 secs and pain and pressure    Dr Bobby Oseguera was paged at 4000 Patient

## 2021-03-21 NOTE — ANESTHESIA PROCEDURE NOTES
Epidural Block    Start time: 3/21/2021 10:51 AM  Reason for block: procedure for pain and at surgeon's request  Staffing  Anesthesiologist: Benjamin Mata MD  Performed: anesthesiologist   Preanesthetic Checklist  Completed: patient identified, site marked, surgical consent, pre-op evaluation, timeout performed, IV checked, risks and benefits discussed and monitors and equipment checked  Epidural  Patient position: sitting  Prep: ChloraPrep  Patient monitoring: cardiac monitor and frequent blood pressure checks  Approach: midline  Location: lumbar (1-5) (l3-4)  Injection technique: SEEMA air  Needle  Needle type: Tuohy   Needle gauge: 18 G  Catheter type: side hole  Catheter size: 20 G  Catheter at skin depth: 10 cm  Test dose: negativeropivacaine (NAROPIN) 0 2% epidural injection, 6 mLnegative aspiration for CSF, negative aspiration for heme and no paresthesia on injection  patient tolerated the procedure well with no immediate complications  Additional Notes  Single skin puncture and needle pass  SEEMA air @ 5 cm  Catheter threaded to 20 cm no paresthesias  Final position 10 at skin  Aspirated neg for heme/csf; td negative

## 2021-03-21 NOTE — OB LABOR/OXYTOCIN SAFETY PROGRESS
Labor Progress Note - Olya Bartholomew 31 y o  female MRN: [id number]    Unit/Bed#: -01 Encounter: [id number]       Contraction Frequency (minutes): 3  Contraction Quality: Mild  Tachysystole: No   Cervical Dilation: 5        Cervical Effacement: 90  Fetal Station: -1  Baseline Rate: 120 bpm  Fetal Heart Rate: 140 BPM  FHR Category: Category I               Vital Signs:   Vitals:    03/21/21 1210   BP: 102/68   Pulse: 74   Resp:    Temp:    SpO2:            Notes/comments:    Artificially ruptured for meconium stained fluid  Category I FHT  Continue expectant management  Dr Allison Parnell aware      Kalli Snider MD 3/21/2021 12:35 PM

## 2021-03-22 LAB — RPR SER QL: NORMAL

## 2021-03-22 PROCEDURE — 99024 POSTOP FOLLOW-UP VISIT: CPT | Performed by: OBSTETRICS & GYNECOLOGY

## 2021-03-22 RX ORDER — ACETAMINOPHEN 325 MG/1
975 TABLET ORAL EVERY 6 HOURS PRN
Status: DISCONTINUED | OUTPATIENT
Start: 2021-03-22 | End: 2021-03-23 | Stop reason: HOSPADM

## 2021-03-22 RX ADMIN — DOCUSATE SODIUM 100 MG: 100 CAPSULE, LIQUID FILLED ORAL at 08:43

## 2021-03-22 RX ADMIN — DOCUSATE SODIUM 100 MG: 100 CAPSULE, LIQUID FILLED ORAL at 17:38

## 2021-03-22 RX ADMIN — IBUPROFEN 600 MG: 600 TABLET ORAL at 10:44

## 2021-03-22 RX ADMIN — ACETAMINOPHEN 975 MG: 325 TABLET, FILM COATED ORAL at 15:41

## 2021-03-22 RX ADMIN — SERTRALINE HYDROCHLORIDE 100 MG: 100 TABLET ORAL at 08:43

## 2021-03-22 RX ADMIN — POLYETHYLENE GLYCOL 3350 17 G: 17 POWDER, FOR SOLUTION ORAL at 08:42

## 2021-03-22 RX ADMIN — IBUPROFEN 600 MG: 600 TABLET ORAL at 04:25

## 2021-03-22 RX ADMIN — IBUPROFEN 600 MG: 600 TABLET ORAL at 17:45

## 2021-03-22 NOTE — PROGRESS NOTES
Progress Note - OB/GYN   Tato Giles 31 y o  female MRN: [id number]  Unit/Bed#: -01 Encounter: [id number]      Assessment:  Post partum day #1 s/p , stable, and doing well    Plan:  1  Continue routine post partum care   - Patient is ambulating, encouraged to continue    - Encourage breastfeeding  2  Continue current meds    - See list below   - Pain well controlled on PO analgesia  3  3a laceration   - S/p 1g cefoxitin   - Scheduled colace and miralax   - Sutures intact this am   4  Disposition   - Anticipate discharge home tomorrow      Subjective/Objective       Subjective:   Pain: yes  Tolerating Oral Intake: yes  Voiding: yes  Flatus: yes  Bowel Movement: no  Ambulating: yes  Breastfeeding: Breastfeeding  Chest Pain: no  Shortness of Breath: no  Leg Pain/Discomfort: no  Lochia: moderate    Objective:   Vitals:   BP 98/55 (BP Location: Left arm)   Pulse 82   Temp 98 1 °F (36 7 °C) (Oral)   Resp 18   Ht 5' 5" (1 651 m)   Wt 69 4 kg (153 lb)   LMP 2020 (Exact Date)   SpO2 96%   Breastfeeding Yes   BMI 25 46 kg/m²   Body mass index is 25 46 kg/m²    I/O        07 -  0700  07 -  0700    I V  (mL/kg)  1950 (28 1)    Total Intake(mL/kg)  1950 (28 1)    Urine (mL/kg/hr)  500    Blood  272    Total Output  772    Net  +1178              Lab Results   Component Value Date    WBC 14 90 (H) 2021    HGB 11 4 (L) 2021    HCT 34 9 2021    MCV 86 2021     2021       Meds/Allergies   Current Facility-Administered Medications   Medication Dose Route Frequency    acetaminophen (TYLENOL) tablet 975 mg  975 mg Oral Q6H PRN    benzocaine-menthol-lanolin-aloe (DERMOPLAST) 20-0 5 % topical spray   Topical 4x Daily PRN    calcium carbonate (TUMS) chewable tablet 1,000 mg  1,000 mg Oral TID PRN    diphenhydrAMINE (BENADRYL) injection 25 mg  25 mg Intravenous Q6H PRN    docusate sodium (COLACE) capsule 100 mg  100 mg Oral BID    hydrocortisone 1 % cream 1 application  1 application Topical 4x Daily PRN    ibuprofen (MOTRIN) tablet 600 mg  600 mg Oral Q6H PRN    ondansetron (ZOFRAN) injection 4 mg  4 mg Intravenous Q8H PRN    polyethylene glycol (MIRALAX) packet 17 g  17 g Oral Daily    sertraline (ZOLOFT) tablet 100 mg  100 mg Oral Daily    witch hazel-glycerin (TUCKS) topical pad 1 pad  1 pad Topical Q2H PRN       Physical Exam:  General: in no apparent distress  Cardiovascular: Cor RRR  Lungs: clear to auscultation bilaterally  Fundus: Firm, 1 cm above the umbilicus  Perineum: Intact, laceration healing well with sutures in place, no purulent drainage, no sign of hematoma    Claus Park MD  PGY-1 OB/GYN   3/22/2021 6:39 AM

## 2021-03-22 NOTE — ANESTHESIA POSTPROCEDURE EVALUATION
Post-Op Assessment Note    CV Status:  Stable  Pain Score: 0    Pain management: adequate     Mental Status:  Alert and awake   Hydration Status:  Euvolemic   PONV Controlled:  Controlled   Airway Patency:  Patent      Post Op Vitals Reviewed: Yes        Post-op block assessment: no complications, site cleaned and catheter intact      No complications documented

## 2021-03-22 NOTE — PLAN OF CARE
Problem: Labor & Delivery  Goal: Manages discomfort  Description: Assess and monitor for signs and symptoms of discomfort  Assess patient's pain level regularly and per hospital policy  Administer medications as ordered  Support use of nonpharmacological methods to help control pain such as distraction, imagery, relaxation, and application of heat and cold  Collaborate with interdisciplinary team and patient to determine appropriate pain management plan  1  Include patient in decisions related to comfort  2  Offer non-pharmacological pain management interventions  3  Report ineffective pain management to physician    Outcome: Progressing     Problem: PAIN - ADULT  Goal: Verbalizes/displays adequate comfort level or baseline comfort level  Description: Interventions:  - Encourage patient to monitor pain and request assistance  - Assess pain using appropriate pain scale  - Administer analgesics based on type and severity of pain and evaluate response  - Implement non-pharmacological measures as appropriate and evaluate response  - Consider cultural and social influences on pain and pain management  - Notify physician/advanced practitioner if interventions unsuccessful or patient reports new pain  Outcome: Progressing     Problem: INFECTION - ADULT  Goal: Absence or prevention of progression during hospitalization  Description: INTERVENTIONS:  - Assess and monitor for signs and symptoms of infection  - Monitor lab/diagnostic results  - Monitor all insertion sites, i e  indwelling lines, tubes, and drains  - Monitor endotracheal if appropriate and nasal secretions for changes in amount and color  - [place] appropriate cooling/warming therapies per order  - Administer medications as ordered  - Instruct and encourage patient and family to use good hand hygiene technique  - Identify and instruct in appropriate isolation precautions for identified infection/condition  Outcome: Progressing  Goal: Absence of fever/infection during neutropenic period  Description: INTERVENTIONS:  - Monitor WBC    Outcome: Progressing     Problem: SAFETY ADULT  Goal: Patient will remain free of falls  Description: INTERVENTIONS:  - Assess patient frequently for physical needs  -  Identify cognitive and physical deficits and behaviors that affect risk of falls    -  [place] fall precautions as indicated by assessment   - Educate patient/family on patient safety including physical limitations  - Instruct patient to call for assistance with activity based on assessment  - Modify environment to reduce risk of injury  - Consider OT/PT consult to assist with strengthening/mobility  Outcome: Progressing  Goal: Maintain or return to baseline ADL function  Description: INTERVENTIONS:  -  Assess patient's ability to carry out ADLs; assess patient's baseline for ADL function and identify physical deficits which impact ability to perform ADLs (bathing, care of mouth/teeth, toileting, grooming, dressing, etc )  - Assess/evaluate cause of self-care deficits   - Assess range of motion  - Assess patient's mobility; develop plan if impaired  - Assess patient's need for assistive devices and provide as appropriate  - Encourage maximum independence but intervene and supervise when necessary  - Involve family in performance of ADLs  - Assess for home care needs following discharge   - Consider OT consult to assist with ADL evaluation and planning for discharge  - Provide patient education as appropriate  Outcome: Progressing  Goal: Maintain or return mobility status to optimal level  Description: INTERVENTIONS:  - Assess patient's baseline mobility status (ambulation, transfers, stairs, etc )    - Identify cognitive and physical deficits and behaviors that affect mobility  - Identify mobility aids required to assist with transfers and/or ambulation (gait belt, sit-to-stand, lift, walker, cane, etc )  - [place] fall precautions as indicated by assessment  - Record patient progress and toleration of activity level on Mobility SBAR; progress patient to next Phase/Stage  - Instruct patient to call for assistance with activity based on assessment  - Consider rehabilitation consult to assist with strengthening/weightbearing, etc   Outcome: Progressing     Problem: DISCHARGE PLANNING  Goal: Discharge to home or other facility with appropriate resources  Description: INTERVENTIONS:  - Identify barriers to discharge w/patient and caregiver  - Arrange for needed discharge resources and transportation as appropriate  - Identify discharge learning needs (meds, wound care, etc )  - Arrange for interpretive services to assist at discharge as needed  - Refer to Case Management Department for coordinating discharge planning if the patient needs post-hospital services based on physician/advanced practitioner order or complex needs related to functional status, cognitive ability, or social support system  Outcome: Progressing

## 2021-03-22 NOTE — LACTATION NOTE
This note was copied from a baby's chart  CONSULT - LACTATION  Baby Boy Abdirahman Wells) Anthony Craft 1 days male MRN: [id number]    [address] [facility] Room / Bed: (N)/(N) Encounter: [id number]    Maternal Information     MOTHER:  Emilia Malhotra  Maternal Age: 31 y o    OB History: # 1 - Date: 21, Sex: Male, Weight: 3550 g (7 lb 13 2 oz), GA: 39w3d, Delivery: Vaginal, Spontaneous, Apgar1: 8, Apgar5: 9, Living: Living, Birth Comments: None   Previouse breast reduction surgery? No    Lactation history:   Has patient previously breast fed: No   How long had patient previously breast fed:     Previous breast feeding complications:       Past Surgical History:   Procedure Laterality Date    COLPOSCOPY      GYNECOLOGIC CRYOSURGERY      PA COLONOSCOPY FLX DX W/COLLJ SPEC WHEN PFRMD N/A 2016    Procedure: EGD AND COLONOSCOPY;  Surgeon: Jassi Terry MD;  Location: [facility]; Service: Gastroenterology    WISDOM TOOTH EXTRACTION      all four        Birth information:  YOB: 2019   Time of birth: 9:00 PM   Sex: male   Delivery type: Vaginal, Spontaneous   Birth Weight: 3550 g (7 lb 13 2 oz)   Percent of Weight Change: 0%     Gestational Age: 38w3d   [unfilled]    Assessment     Breast and nipple assessment: normal assessment     Assessment: sleepy    Feeding assessment: no latch  LATCH:  Latch: Repeated attempts, hold nipple in mouth, stimulate to suck   Audible Swallowing: None   Type of Nipple: Everted (After stimulation)   Comfort (Breast/Nipple): Soft/non-tender   Hold (Positioning): Full assist, staff holds infant at breast   LATCH Score: 5          Feeding recommendations:  breast feed on demand     Natan was sleepy at the time of assessment  HE +++++    Information on hand expression given   Discussed benefits of knowing how to manually express breast including stimulating milk supply, softening nipple for latch and evacuating breast in the event of engorgement  Met with mother  Provided mother with Ready, Set, Baby booklet  Discussed Skin to Skin contact an benefits to mom and baby  Talked about the delay of the first bath until baby has adjusted  Spoke about the benefits of rooming in  Feeding on cue and what that means for recognizing infant's hunger  Avoidance of pacifiers for the first month discussed  Talked about exclusive breastfeeding for the first 6 months  Positioning and latch reviewed as well as showing images of other feeding positions  Discussed the properties of a good latch in any position  Reviewed hand/manual expression  Discussed s/s that baby is getting enough milk and some s/s that breastfeeding dyad may need further help  Gave information on common concerns, what to expect the first few weeks after delivery, preparing for other caregivers, and how partners can help  Resources for support also provided  Worked on positioning infant up at chest level and starting to feed infant with nose arriving at the nipple  Then, using areolar compression to achieve a deep latch that is comfortable and exchanges optimum amounts of milk  Encouraged parents to call for assistance, questions, and concerns about breastfeeding  Extension provided    Rajendra Lainez RN 3/22/2021 6:54 PM

## 2021-03-22 NOTE — L&D DELIVERY NOTE
Vaginal Delivery Summary - OB/GYN   Leopold Kid 31 y o  female MRN: [id number]  Unit/Bed#: -01 Encounter: [id number]          Pre-delivery Diagnosis:   1  Pregnancy at 39 weeks    2  Anxiety     Post-delivery Diagnosis: same, delivered    Procedure: Spontaneous Vaginal Delivery     Attending: Dr Rocky Wilder    Assistant(s): Dr Charleen Wetzel     Anesthesia: Epidural    QBL: 467 cc    Complications: none apparent    Specimens:   1  Arterial and venous cord gases  2  Cord blood  3  Segment of umbilical cord  4  Placenta to storage     Findings:  1  Viable male on 3/21/2021 at 2100, with APGARS of 8 and 9 at 1 and 5 minutes respectively,  2  Spontaneous delivery of intact placenta at   3  3 degree laceration repaired with 2-0 and 3-0 vicryl     Gases:  Umbilical Cord Venous Blood Gas:  Results from last 7 days   Lab Units 21   PH COV  7 322   PCO2 COV mm HG 43 4*   HCO3 COV mmol/L 22 0   BASE EXC COV mmol/L -4 0*   O2 CT CD VB mL/dL 11 4   O2 HGB, VENOUS CORD % 08 5     Umbilical Cord Arterial Blood Gas:  Results from last 7 days   Lab Units 21   PH COA  7 188*   PCO2 COA  56 7   PO2 COA mm HG 20 7   HCO3 COA mmol/L 21 1   BASE EXC COA mmol/L -8 0*   O2 CONTENT CORD ART ml/dl 9 4   O2 HGB, ARTERIAL CORD % 39 7       Disposition:  Patient tolerated the procedure well and was recovering in labor and delivery room       Brief history and labor course:  Ms Leopold Kid is a 31 y o   at 44wk3d  She presented to labor and delivery for labor  Her pregnancy was uncomplicated  On exam upon admission she was noted to be 4/90/-1  She was admitted for labor and received and epidural  She was AROM for clear fluid and progressed to complete  Description of procedure:    After pushing for 2h 18 minutes, at 2100 patient delivered a viable male , wt pending, apgars of 8 (1 min) and 9 (5 min)  The fetal vertex delivered spontaneously  There was no nuchal cord   The right anterior shoulder delivered atraumatically with maternal expulsive forces and the assistance of gentle downward traction  The left posterior shoulder delivered with maternal expulsive forces and the assistance of gentle upward traction  The remainder of the fetus delivered spontaneously  Upon delivery, the infant was placed on the mothers abdomen and, after 30 seconds delay, the cord was doubly clamped and cut  The infant was noted to cry spontaneously and was moving all extremities appropriately  There was no evidence for injury  Awaiting nurse resuscitators evaluated the   Arterial and venous cord blood gases and cord blood was collected for analysis  These were promptly sent to the lab  In the immediate post-partum, 30 units of IV pitocin was administered, and the uterus was noted to contract down well with massage and pitocin  The placenta delivered spontaneously at 2104 and was noted to have a centrally inserted 3 vessel cord  Meconium stained fluid was present as well as terminal mec  The vagina, cervix, perineum, and rectum were inspected and there was noted to be a 3a laceration   Laceration Repair    Third degree repair:     The external anal sphincter ends were grasped with an Allis and using 2 0 Vicryl suture, the sphincter was reapproximated with 4 interrupted sutures placed at the posterior, inferior, superior and anterior aspects with care taken to incorporate the muscle capsule in the closure  Using a 2nd suture, the apex of the vaginal laceration was identified and a suture of 3 0 Vicryl was placed 1 cm above the apex  The vaginal mucosa and underlying rectovaginal fascia were closed using a running locked suture to the hymenal ring  The suture was then brought underneath the hymenal ring  A stitch was then placed through the bulbocavernosus muscle  Continuing with the same suture, the transverse perineal muscles were reapproximated    The suture was brought to the posterior apex of the skin laceration and then the skin was reapproximated in a subcuticular fashion to the hymenal ring  Excellent hemostasis was achieved  After completion of the procedure, a digital rectal exam was performed to confirm the absence of defects  The patient tolerated the procedure well  At the conclusion of the procedure, all needle, sponge, and instrument counts were noted to be correct  Patient tolerated the procedure well and was allowed to recover in labor and delivery room with family and  before being transferred to the post-partum floor  Dr Amilcar Veliz was present and participated in all key portions of the case        Yuki Can MD  OB/GYN PGY-1  3/21/2021  9:33 PM

## 2021-03-22 NOTE — PLAN OF CARE
Problem: Knowledge Deficit  Goal: Verbalizes understanding of labor plan  Description: Assess patient/family/caregiver's baseline knowledge level and ability to understand information  Provide education via patient/family/caregiver's preferred learning method at appropriate level of understanding  1  Provide teaching at level of understanding  2  Provide teaching via preferred learning method(s)  Outcome: Completed  Goal: Patient/family/caregiver demonstrates understanding of disease process, treatment plan, medications, and discharge instructions  Description: Complete learning assessment and assess knowledge base    Interventions:  - Provide teaching at level of understanding  - Provide teaching via preferred learning methods  Outcome: Completed

## 2021-03-23 PROCEDURE — 99024 POSTOP FOLLOW-UP VISIT: CPT | Performed by: OBSTETRICS & GYNECOLOGY

## 2021-03-23 RX ORDER — ACETAMINOPHEN 325 MG/1
975 TABLET ORAL EVERY 6 HOURS PRN
Refills: 0
Start: 2021-03-23 | End: 2021-04-01

## 2021-03-23 RX ORDER — IBUPROFEN 600 MG/1
600 TABLET ORAL EVERY 6 HOURS PRN
Qty: 30 TABLET | Refills: 0 | Status: SHIPPED | OUTPATIENT
Start: 2021-03-23 | End: 2021-04-01

## 2021-03-23 RX ADMIN — Medication: at 08:56

## 2021-03-23 RX ADMIN — IBUPROFEN 600 MG: 600 TABLET ORAL at 08:56

## 2021-03-23 RX ADMIN — IBUPROFEN 600 MG: 600 TABLET ORAL at 01:00

## 2021-03-23 RX ADMIN — POLYETHYLENE GLYCOL 3350 17 G: 17 POWDER, FOR SOLUTION ORAL at 08:56

## 2021-03-23 RX ADMIN — SERTRALINE HYDROCHLORIDE 100 MG: 100 TABLET ORAL at 08:56

## 2021-03-23 RX ADMIN — DOCUSATE SODIUM 100 MG: 100 CAPSULE, LIQUID FILLED ORAL at 08:56

## 2021-03-23 NOTE — PROGRESS NOTES
Progress Note - OB/GYN   Sommer Foster 31 y o  female MRN: [id number]  Unit/Bed#: -01 Encounter: [id number]      Assessment:  Post partum day #2 s/p , stable, and doing well    Plan:  1  Continue routine post partum care   - Patient is ambulating, encouraged to continue    - Encourage breastfeeding  2  Continue current meds    - See list below   - Pain well controlled on PO analgesia  3  3a laceration   - S/p 1g cefoxitin   - Scheduled colace and miralax   - Sutures intact this am, healing well  4  Disposition   - Anticipate discharge home today      Subjective/Objective       Subjective:   Pain: yes  Tolerating Oral Intake: yes  Voiding: yes  Flatus: yes  Bowel Movement: no  Ambulating: yes  Breastfeeding: Breastfeeding  Chest Pain: no  Shortness of Breath: no  Leg Pain/Discomfort: no  Lochia: minimal    Objective:   Vitals:   BP 95/50 (BP Location: Left arm)   Pulse 79   Temp 98 3 °F (36 8 °C) (Oral)   Resp 18   Ht 5' 5" (1 651 m)   Wt 69 4 kg (153 lb)   LMP 2020 (Exact Date)   SpO2 98%   Breastfeeding Yes   BMI 25 46 kg/m²   Body mass index is 25 46 kg/m²    I/O        07 -  0700  07 -  0700    I V  (mL/kg)  1950 (28 1)    Total Intake(mL/kg)  1950 (28 1)    Urine (mL/kg/hr)  500    Blood  272    Total Output  772    Net  +1178              Lab Results   Component Value Date    WBC 14 90 (H) 2021    HGB 11 4 (L) 2021    HCT 34 9 2021    MCV 86 2021     2021       Meds/Allergies   Current Facility-Administered Medications   Medication Dose Route Frequency    acetaminophen (TYLENOL) tablet 975 mg  975 mg Oral Q6H PRN    benzocaine-menthol-lanolin-aloe (DERMOPLAST) 20-0 5 % topical spray   Topical 4x Daily PRN    calcium carbonate (TUMS) chewable tablet 1,000 mg  1,000 mg Oral TID PRN    diphenhydrAMINE (BENADRYL) injection 25 mg  25 mg Intravenous Q6H PRN    docusate sodium (COLACE) capsule 100 mg  100 mg Oral BID    hydrocortisone 1 % cream 1 application  1 application Topical 4x Daily PRN    ibuprofen (MOTRIN) tablet 600 mg  600 mg Oral Q6H PRN    ondansetron (ZOFRAN) injection 4 mg  4 mg Intravenous Q8H PRN    polyethylene glycol (MIRALAX) packet 17 g  17 g Oral Daily    sertraline (ZOLOFT) tablet 100 mg  100 mg Oral Daily    witch hazel-glycerin (TUCKS) topical pad 1 pad  1 pad Topical Q2H PRN       Physical Exam:  General: in no apparent distress  Cardiovascular: Cor RRR  Lungs: clear to auscultation bilaterally  Fundus: Firm, 1 cm above the umbilicus  Perineum: Intact, laceration healing well with sutures in place, no purulent drainage, no sign of hematoma    Martinez Chowdary MD  PGY-1 OB/GYN   3/23/2021 6:49 AM

## 2021-03-23 NOTE — LACTATION NOTE
This note was copied from a baby's chart  Met with mother to go over discharge breastfeeding booklet including the feeding log  Emphasized 8 or more (12) feedings in a 24 hour period, what to expect for the number of diapers per day of life and the progression of properties of the  stooling pattern  Reviewed breastfeeding and your lifestyle, storage and preparation of breast milk, how to keep you breast pump clean, the employed breastfeeding mother and paced bottle feeding handouts  Booklet included Breastfeeding Resources for after discharge including access to the number for the [address]  Discussed s/s engorgement and how to manage with medications and cool compresses as well as s/s mastitis and when to contact physician  Enc parents to call for assistance with latching in a different hold if desired, discussed proper alignment of the baby in any position to achieve a proper latch and adequate milk transfer

## 2021-03-23 NOTE — PLAN OF CARE
Problem: Labor & Delivery  Goal: Manages discomfort  Description: Assess and monitor for signs and symptoms of discomfort  Assess patient's pain level regularly and per hospital policy  Administer medications as ordered  Support use of nonpharmacological methods to help control pain such as distraction, imagery, relaxation, and application of heat and cold  Collaborate with interdisciplinary team and patient to determine appropriate pain management plan  1  Include patient in decisions related to comfort  2  Offer non-pharmacological pain management interventions  3  Report ineffective pain management to physician    Outcome: Completed     Problem: PAIN - ADULT  Goal: Verbalizes/displays adequate comfort level or baseline comfort level  Description: Interventions:  - Encourage patient to monitor pain and request assistance  - Assess pain using appropriate pain scale  - Administer analgesics based on type and severity of pain and evaluate response  - Implement non-pharmacological measures as appropriate and evaluate response  - Consider cultural and social influences on pain and pain management  - Notify physician/advanced practitioner if interventions unsuccessful or patient reports new pain  Outcome: Completed     Problem: INFECTION - ADULT  Goal: Absence or prevention of progression during hospitalization  Description: INTERVENTIONS:  - Assess and monitor for signs and symptoms of infection  - Monitor lab/diagnostic results  - Monitor all insertion sites, i e  indwelling lines, tubes, and drains  - Monitor endotracheal if appropriate and nasal secretions for changes in amount and color  - [place] appropriate cooling/warming therapies per order  - Administer medications as ordered  - Instruct and encourage patient and family to use good hand hygiene technique  - Identify and instruct in appropriate isolation precautions for identified infection/condition  Outcome: Completed  Goal: Absence of fever/infection during neutropenic period  Description: INTERVENTIONS:  - Monitor WBC    Outcome: Completed     Problem: SAFETY ADULT  Goal: Patient will remain free of falls  Description: INTERVENTIONS:  - Assess patient frequently for physical needs  -  Identify cognitive and physical deficits and behaviors that affect risk of falls    -  [place] fall precautions as indicated by assessment   - Educate patient/family on patient safety including physical limitations  - Instruct patient to call for assistance with activity based on assessment  - Modify environment to reduce risk of injury  - Consider OT/PT consult to assist with strengthening/mobility  Outcome: Completed  Goal: Maintain or return to baseline ADL function  Description: INTERVENTIONS:  -  Assess patient's ability to carry out ADLs; assess patient's baseline for ADL function and identify physical deficits which impact ability to perform ADLs (bathing, care of mouth/teeth, toileting, grooming, dressing, etc )  - Assess/evaluate cause of self-care deficits   - Assess range of motion  - Assess patient's mobility; develop plan if impaired  - Assess patient's need for assistive devices and provide as appropriate  - Encourage maximum independence but intervene and supervise when necessary  - Involve family in performance of ADLs  - Assess for home care needs following discharge   - Consider OT consult to assist with ADL evaluation and planning for discharge  - Provide patient education as appropriate  Outcome: Completed  Goal: Maintain or return mobility status to optimal level  Description: INTERVENTIONS:  - Assess patient's baseline mobility status (ambulation, transfers, stairs, etc )    - Identify cognitive and physical deficits and behaviors that affect mobility  - Identify mobility aids required to assist with transfers and/or ambulation (gait belt, sit-to-stand, lift, walker, cane, etc )  - [place] fall precautions as indicated by assessment  - Record patient progress and toleration of activity level on Mobility SBAR; progress patient to next Phase/Stage  - Instruct patient to call for assistance with activity based on assessment  - Consider rehabilitation consult to assist with strengthening/weightbearing, etc   Outcome: Completed     Problem: DISCHARGE PLANNING  Goal: Discharge to home or other facility with appropriate resources  Description: INTERVENTIONS:  - Identify barriers to discharge w/patient and caregiver  - Arrange for needed discharge resources and transportation as appropriate  - Identify discharge learning needs (meds, wound care, etc )  - Arrange for interpretive services to assist at discharge as needed  - Refer to Case Management Department for coordinating discharge planning if the patient needs post-hospital services based on physician/advanced practitioner order or complex needs related to functional status, cognitive ability, or social support system  Outcome: Completed     Problem: DISCHARGE PLANNING - CARE MANAGEMENT  Goal: Discharge to post-acute care or home with appropriate resources  Description: INTERVENTIONS:  - Conduct assessment to determine patient/family and health care team treatment goals, and need for post-acute services based on payer coverage, community resources, and patient preferences, and barriers to discharge  - Address psychosocial, clinical, and financial barriers to discharge as identified in assessment in conjunction with the patient/family and health care team  - Arrange appropriate level of post-acute services according to patients   needs and preference and payer coverage in collaboration with the physician and health care team  - Communicate with and update the patient/family, physician, and health care team regarding progress on the discharge plan  - Arrange appropriate transportation to post-acute venues  Outcome: Completed     Problem: Knowledge Deficit  Goal: Patient/family/caregiver demonstrates understanding of disease process, treatment plan, medications, and discharge instructions  Description: Complete learning assessment and assess knowledge base    Interventions:  - Provide teaching at level of understanding  - Provide teaching via preferred learning methods  Outcome: Completed     Problem: ALTERATION IN THE BREAST  Goal: Optimize infant feeding at the breast  Description: INTERVENTIONS:  - Latch, breast and nipple assessment  - Assess prior breast feeding history  - Hand expression of breast milk  - For cracked, bleeding and or sore nipples reassess latch, treat damaged nipple  -Educate mother on feeding cues  -Positioning/latch techniques  Outcome: Completed     Problem: INADEQUATE LATCH, SUCK OR SWALLOW  Goal: Demonstrate ability to latch and sustain latch, audible swallowing and satiety  Description: INTERVENTIONS:  - Assess oral anatomy, notify Physician/AP for abnormal findings  - Establish milk expression  - Maximize feeding opportunity (skin to skin, behavioral state)  - Position/latch techniques  - Discourage use of pacifier-artificial nipple  - Mechanical pumping  - Nipple Shield  - Supplemental formula feeding (Physician/AP order)  - Alternative feeding method  Outcome: Completed     Problem: POSTPARTUM  Goal: Experiences normal postpartum course  Description: INTERVENTIONS:  - Monitor maternal vital signs  - Assess uterine involution and lochia  Outcome: Completed  Goal: Appropriate maternal -  bonding  Description: INTERVENTIONS:  - Identify family support  - Assess for appropriate maternal/infant bonding   -Encourage maternal/infant bonding opportunities  - Referral to  or  as needed  Outcome: Completed  Goal: Establishment of infant feeding pattern  Description: INTERVENTIONS:  - Assess breast/bottle feeding  - Refer to lactation as needed  Outcome: Completed  Goal: Incision(s), wounds(s) or drain site(s) healing without S/S of infection  Description: INTERVENTIONS  - Assess and document risk factors for skin impairment   - Assess and document dressing, incision, wound bed, drain sites and surrounding tissue  - Consider nutrition services referral as needed  - Oral mucous membranes remain intact  - Provide patient/ family education  Outcome: Completed

## 2021-03-26 NOTE — DISCHARGE SUMMARY
Discharge Summary - Jeanne Worthington 31 y o  female MRN: [id number]    Unit/Bed#: -01 Encounter: [id number]    Admission Date: 3/21/2021     Discharge Date: 3/23/21    Admitting Diagnosis:   Patient Active Problem List   Diagnosis    Intractable chronic migraine without aura    Anxiety disorder    Vitamin D deficiency    39 weeks gestation of pregnancy    Supervision of normal pregnancy     (spontaneous vaginal delivery)    Type 3a perineal laceration       Discharge Diagnosis:   Same, delivered    Procedures:   spontaneous vaginal delivery    Admitting Attending: Dr Russell Schuster  Delivery Attending: Dr Russell Schuster  Discharge Attending: Dr Alfred Whaley Course:   Ms Jeanne Worthington is a 31 y o   at 44wk3d  She presented to labor and delivery for labor  Her pregnancy was uncomplicated  On exam upon admission she was noted to be 4/-1  She was admitted for labor and received and epidural  She was AROM for clear fluid and progressed to complete  She then underwent an uncomplicated spontaneous vaginal delivery and delivered a viable male  at 2100  APGARS were 8, 9 at 1 and 5 minutes, respectively   weighed 7lb 13 oz  Placenta was delivered at 2104   was then transferred to  nursery  Patient tolerated the procedure well and was transferred to recovery in stable condition  The patient's post partum course was unremarkable  On day of discharge, she was ambulating and able to reasonably perform all ADLs  She was voiding and had appropriate bowel function  Pain was well controlled  She was discharged home on postpartum day #2 without complications  Patient was instructed to follow up with her OB as an outpatient and was given appropriate warnings to call provider if she develops signs of infection or uncontrolled pain  On day of discharge she was ambulating, voiding spontaneously, tolerating oral intake and hemodynamically stable  She is breast feeding   Condition at discharge:   good     Disposition:   Home    Planned Readmission:   No    Discharge Medications:   Prenatal vitamin daily for 6 months or the duration of nursing whichever is longer  Motrin 600 mg orally every 6 hours as needed for pain  Tylenol (over the counter) per bottle directions as needed for pain  Hydrocortisone cream 1% (over the counter) applied 1-2x daily to hemorrhoids as needed  Witch hazel pads for hemorrhoidal discomfort as needed      Discharge instructions :   -Do not place anything (no partner, tampons or douche) in your vagina for 6 weeks  -You may walk for exercise for the first 6 weeks then gradually return to your usual activities    -Please do not drive for 1 week if you have no stitches and for 2 weeks if you have stitches or underwent a  delivery     -You may take baths or shower per your preference    -Please look at your bust (breasts) in the mirror daily and call provider for redness or tenderness or increased warmth  - If you have had a  please look at your incision daily as well and call provider for increasing redness or steady drainage from the incision    -Please call your provider if temperature > 100 4*F or 38* C, worsening pain or a foul discharge      Cristina Shearer MD  PGY-1 OB/GYN   3/26/2021 9:23 AM

## 2021-03-28 LAB — PLACENTA IN STORAGE: NORMAL

## 2021-04-01 ENCOUNTER — POSTPARTUM VISIT (OUTPATIENT)
Dept: OBGYN CLINIC | Facility: CLINIC | Age: 31
End: 2021-04-01
Payer: COMMERCIAL

## 2021-04-01 VITALS
BODY MASS INDEX: 22.33 KG/M2 | HEIGHT: 65 IN | SYSTOLIC BLOOD PRESSURE: 100 MMHG | DIASTOLIC BLOOD PRESSURE: 68 MMHG | WEIGHT: 134 LBS

## 2021-04-01 DIAGNOSIS — R30.0 DYSURIA: ICD-10-CM

## 2021-04-01 DIAGNOSIS — R11.0 NAUSEA: ICD-10-CM

## 2021-04-01 LAB
SL AMB  POCT GLUCOSE, UA: NORMAL
SL AMB LEUKOCYTE ESTERASE,UA: NORMAL
SL AMB POCT BILIRUBIN,UA: NORMAL
SL AMB POCT BLOOD,UA: NORMAL
SL AMB POCT CLARITY,UA: NORMAL
SL AMB POCT COLOR,UA: YELLOW
SL AMB POCT KETONES,UA: NORMAL
SL AMB POCT NITRITE,UA: NORMAL
SL AMB POCT PH,UA: 6
SL AMB POCT SPECIFIC GRAVITY,UA: 1.03
SL AMB POCT URINE PROTEIN: NORMAL
SL AMB POCT UROBILINOGEN: NORMAL

## 2021-04-01 PROCEDURE — 87077 CULTURE AEROBIC IDENTIFY: CPT | Performed by: OBSTETRICS & GYNECOLOGY

## 2021-04-01 PROCEDURE — 99024 POSTOP FOLLOW-UP VISIT: CPT | Performed by: OBSTETRICS & GYNECOLOGY

## 2021-04-01 PROCEDURE — 81003 URINALYSIS AUTO W/O SCOPE: CPT | Performed by: OBSTETRICS & GYNECOLOGY

## 2021-04-01 PROCEDURE — 87186 SC STD MICRODIL/AGAR DIL: CPT | Performed by: OBSTETRICS & GYNECOLOGY

## 2021-04-01 PROCEDURE — 87086 URINE CULTURE/COLONY COUNT: CPT | Performed by: OBSTETRICS & GYNECOLOGY

## 2021-04-01 RX ORDER — ONDANSETRON 4 MG/1
4 TABLET, ORALLY DISINTEGRATING ORAL EVERY 6 HOURS PRN
Qty: 20 TABLET | Refills: 0 | Status: SHIPPED | OUTPATIENT
Start: 2021-04-01 | End: 2021-09-03

## 2021-04-01 NOTE — PROGRESS NOTES
Assessment/Plan     Normal postpartum exam      1  Contraception: none  2  Annual exam due in July  3  Lactation consult, [address] information discussed  4  Increase activity as tolerated, may resume all normal activity at 6 weeks postpartum  5  Nausea - poss related to NSAID use, continue to monitor as it is improving  Anticipated return to work: 6 - 12 weeks post partum  Jaja Garcia is a 31 y o  female who presents for a postpartum visit  She is 2 weeks postpartum following a spontaneous vaginal delivery  I have fully reviewed the prenatal and intrapartum course  The delivery was at 39 3 gestational weeks  Anesthesia: epidural  Laceration: 3A  She complains of nausea that is improving    Bleeding thin lochia  Bowel function is normal  Bladder - mild burning - normal UA  Patient has not been sexually active  Desired contraception method is condoms  Postpartum depression screening: negative  EPDS : 5    Baby's course has been uneventful     Baby is feeding by breast     Last Pap : 2019 ; no abnormalities  Gestational Diabetes: no  Pregnancy Complications: none    The following portions of the patient's history were reviewed and updated as appropriate: allergies, current medications, past family history, past medical history, past social history, past surgical history and problem list       Current Outpatient Medications:     Prenatal Multivit-Min-Fe-FA (PRE-HAMIDA FORMULA) TABS, Take by mouth daily, Disp: , Rfl:     sertraline (ZOLOFT) 100 mg tablet, Take 1 tablet (100 mg total) by mouth daily, Disp: 30 tablet, Rfl: 1    ondansetron (ZOFRAN-ODT) 4 mg disintegrating tablet, Take 1 tablet (4 mg total) by mouth every 6 (six) hours as needed for nausea or vomiting, Disp: 20 tablet, Rfl: 0    No Known Allergies    Review of Systems  Constitutional: no fever, feels well  Breasts: no complaints of breast pain, breast lump, or nipple discharge  Gastrointestinal: no complaints nausea, vomiting  Genitourinary: as noted in HPI    Neurological: no complaints of headache      Objective      /68   Ht 5' 5" (1 651 m)   Wt 60 8 kg (134 lb)   LMP 06/18/2020 (Exact Date)   BMI 22 30 kg/m²     OBGyn Exam  General: alert and oriented, in no acute distress  Vulva: normal  Vagina: normal mucosa, healing perineum  Cervix: multiparous appearance

## 2021-04-03 LAB — BACTERIA UR CULT: ABNORMAL

## 2021-04-05 DIAGNOSIS — N30.00 ACUTE CYSTITIS WITHOUT HEMATURIA: Primary | ICD-10-CM

## 2021-04-05 RX ORDER — NITROFURANTOIN 25; 75 MG/1; MG/1
100 CAPSULE ORAL 2 TIMES DAILY
Qty: 14 CAPSULE | Refills: 0 | Status: SHIPPED | OUTPATIENT
Start: 2021-04-05 | End: 2021-04-12

## 2021-04-06 ENCOUNTER — IMMUNIZATIONS (OUTPATIENT)
Dept: FAMILY MEDICINE CLINIC | Facility: HOSPITAL | Age: 31
End: 2021-04-06

## 2021-04-06 DIAGNOSIS — Z23 ENCOUNTER FOR IMMUNIZATION: Primary | ICD-10-CM

## 2021-04-06 PROCEDURE — 91300 SARS-COV-2 / COVID-19 MRNA VACCINE (PFIZER-BIONTECH) 30 MCG: CPT

## 2021-04-06 PROCEDURE — 0001A SARS-COV-2 / COVID-19 MRNA VACCINE (PFIZER-BIONTECH) 30 MCG: CPT

## 2021-04-20 ENCOUNTER — POSTPARTUM VISIT (OUTPATIENT)
Dept: OBGYN CLINIC | Facility: CLINIC | Age: 31
End: 2021-04-20

## 2021-04-20 VITALS — SYSTOLIC BLOOD PRESSURE: 110 MMHG | WEIGHT: 132 LBS | DIASTOLIC BLOOD PRESSURE: 60 MMHG | BODY MASS INDEX: 21.97 KG/M2

## 2021-04-20 PROBLEM — Z3A.39 39 WEEKS GESTATION OF PREGNANCY: Status: RESOLVED | Noted: 2020-09-11 | Resolved: 2021-04-20

## 2021-04-20 PROBLEM — Z34.90 SUPERVISION OF NORMAL PREGNANCY: Status: RESOLVED | Noted: 2020-11-06 | Resolved: 2021-04-20

## 2021-04-20 PROCEDURE — 99024 POSTOP FOLLOW-UP VISIT: CPT | Performed by: OBSTETRICS & GYNECOLOGY

## 2021-04-20 NOTE — PROGRESS NOTES
Assessment/Plan     Normal postpartum exam      1  Contraception: condoms  2  Annual exam due in July  Pap due   3  Lactation consult, [address] information discussed  4  Increase activity as tolerated, may resume all normal activity  5  Anticipated return to work: 12 weeks post partum  6  Migraines: pain after sumitriptan likely vasoconstriction  OK to use fioricet  Has follow up with neurology scheduled  Subjective     Aline Pearson is a 31 y o  female who presents for a postpartum visit  She is 4 weeks postpartum following a spontaneous vaginal delivery  I have fully reviewed the prenatal and intrapartum course  The delivery was at 44 gestational weeks  Anesthesia: epidural  Laceration: 3rd degree  Kuldip Hong has been noticing migraines which she has a history of in the past  Has used sumitriptan twice and experience severe breast/nipple pain and burning to the point that it was too painful to nurse  She was able to pump  Bleeding no bleeding  Bowel function is normal  Bladder function is normal  Patient has not been sexually active  Desired contraception method is none  Postpartum depression screening: negative  EPDS : 3      Baby's course has been uncomplicated     Baby is feeding by breast     Last Pap : 2019 ; no abnormalities  Gestational Diabetes: no  Pregnancy Complications: none    The following portions of the patient's history were reviewed and updated as appropriate: allergies, current medications, past family history, past medical history, past social history, past surgical history and problem list       Current Outpatient Medications:     ondansetron (ZOFRAN-ODT) 4 mg disintegrating tablet, Take 1 tablet (4 mg total) by mouth every 6 (six) hours as needed for nausea or vomiting, Disp: 20 tablet, Rfl: 0    Prenatal Multivit-Min-Fe-FA (PRE- FORMULA) TABS, Take by mouth daily, Disp: , Rfl:     sertraline (ZOLOFT) 100 mg tablet, Take 1 tablet (100 mg total) by mouth daily, Disp: 30 tablet, Rfl: 1    No Known Allergies    Review of Systems  Constitutional: no fever, feels well  Breasts: no complaints of breast pain, breast lump, or nipple discharge  Gastrointestinal: no complaints nausea, vomiting  Genitourinary: as noted in HPI    Neurological: no complaints of headache      Objective      /60   Wt 59 9 kg (132 lb)   LMP 06/18/2020 (Exact Date)   Breastfeeding Yes   BMI 21 97 kg/m²     OBGyn Exam  General: alert and oriented, in no acute distress  Vulva: normal, [place]'s, Urethra, [place] normal, perineum well healed  Vagina: normal mucosa  Cervix: no cervical motion tenderness  Uterus:  normal size, mobile, non-tender

## 2021-04-27 ENCOUNTER — IMMUNIZATIONS (OUTPATIENT)
Dept: FAMILY MEDICINE CLINIC | Facility: HOSPITAL | Age: 31
End: 2021-04-27

## 2021-04-27 DIAGNOSIS — Z23 ENCOUNTER FOR IMMUNIZATION: Primary | ICD-10-CM

## 2021-04-27 PROCEDURE — 0002A SARS-COV-2 / COVID-19 MRNA VACCINE (PFIZER-BIONTECH) 30 MCG: CPT

## 2021-04-27 PROCEDURE — 91300 SARS-COV-2 / COVID-19 MRNA VACCINE (PFIZER-BIONTECH) 30 MCG: CPT

## 2021-09-03 ENCOUNTER — OFFICE VISIT (OUTPATIENT)
Dept: FAMILY MEDICINE CLINIC | Facility: CLINIC | Age: 31
End: 2021-09-03
Payer: COMMERCIAL

## 2021-09-03 VITALS
HEART RATE: 99 BPM | HEIGHT: 65 IN | SYSTOLIC BLOOD PRESSURE: 114 MMHG | WEIGHT: 120 LBS | BODY MASS INDEX: 19.99 KG/M2 | OXYGEN SATURATION: 98 % | RESPIRATION RATE: 16 BRPM | DIASTOLIC BLOOD PRESSURE: 60 MMHG | TEMPERATURE: 97.8 F

## 2021-09-03 DIAGNOSIS — G43.719 INTRACTABLE CHRONIC MIGRAINE WITHOUT AURA AND WITHOUT STATUS MIGRAINOSUS: ICD-10-CM

## 2021-09-03 DIAGNOSIS — M79.10 MYALGIA: Primary | ICD-10-CM

## 2021-09-03 DIAGNOSIS — Z00.00 WELL ADULT EXAM: ICD-10-CM

## 2021-09-03 PROCEDURE — 99395 PREV VISIT EST AGE 18-39: CPT | Performed by: FAMILY MEDICINE

## 2021-09-03 RX ORDER — IBUPROFEN 600 MG/1
TABLET ORAL
COMMUNITY
End: 2022-05-05

## 2021-09-03 RX ORDER — DOCUSATE SODIUM 100 MG/1
CAPSULE, LIQUID FILLED ORAL
COMMUNITY
End: 2022-03-03 | Stop reason: ALTCHOICE

## 2021-09-03 RX ORDER — ERGOCALCIFEROL (VITAMIN D2) 1250 MCG
CAPSULE ORAL
COMMUNITY

## 2021-09-03 RX ORDER — BUTALBITAL, ACETAMINOPHEN AND CAFFEINE 300; 40; 50 MG/1; MG/1; MG/1
CAPSULE ORAL
COMMUNITY
End: 2022-03-03 | Stop reason: SDUPTHER

## 2021-09-03 RX ORDER — NITROFURANTOIN 25; 75 MG/1; MG/1
CAPSULE ORAL
COMMUNITY
End: 2021-11-12 | Stop reason: ALTCHOICE

## 2021-09-03 NOTE — PROGRESS NOTES
FAMILY PRACTICE OFFICE VISIT       NAME: Kelby Herrera  AGE: 31 y o  SEX: female       : 1990        MRN: [id number]    DATE: 2021  TIME: 4:38 PM    Assessment and Plan     Problem List Items Addressed This Visit        Cardiovascular and Mediastinum    Intractable chronic migraine without aura    Relevant Medications    Butalbital-APAP-Caffeine (Fioricet) -40 MG CAPS    ibuprofen (MOTRIN) 600 mg tablet       Other    Well adult exam       Well adult  Overall the patient appears to be in stable health  She was recommended to try and incorporate a more regular form of aerobic exercise for 20 minutes 2-3 days a week  Her gynecology exam is up-to-date         Myalgia - Primary      Myalgias  Patient with vague muscle discomfort of lower extremities especially at bedtime  Patient will obtain blood work as ordered for further evaluation  If blood work is within normal limits patient may consider consultation with Sleep Department for possibility of restless legs syndrome         Relevant Orders    Comprehensive metabolic panel    TSH, 3rd generation    Sedimentation rate, automated    ASHISH Screen w/ Reflex to Titer/Pattern    HLA-B27 antigen    Magnesium              Chief Complaint     Chief Complaint   Patient presents with    Annual Exam     phq due        History of Present Illness       Patient the office for annual wellness exam patient  Patient recently had a child in 2021  She continues with her Zoloft medication for migraine headaches however she is experiencing migraine headaches somewhat frequently  Due to her breast feeding she is limited in what she is able to take for headaches  Patient describes vague sensation in her legs especially at nighttime  She does not have a regular form of exercise at this time  Review of Systems   Review of Systems   Constitutional: Negative  HENT: Negative  Respiratory: Negative  Cardiovascular: Negative      Gastrointestinal: Negative  Genitourinary: Negative  Musculoskeletal:        As per HPI   Neurological: Positive for headaches  Psychiatric/Behavioral: Positive for sleep disturbance  Active Problem List     Patient Active Problem List   Diagnosis    Intractable chronic migraine without aura    Anxiety disorder    Vitamin D deficiency    Well adult exam    Myalgia       Past Medical History:  Past Medical History:   Diagnosis Date    Abnormal Pap smear of cervix     Anal fissure     Anxiety     Constipation     Depression     Headache     migranes    IBS (irritable bowel syndrome)     Migraine     Varicella        Past Surgical History:  Past Surgical History:   Procedure Laterality Date    COLPOSCOPY      GYNECOLOGIC CRYOSURGERY      MD COLONOSCOPY FLX DX W/COLLJ SPEC WHEN PFRMD N/A 8/24/2016    Procedure: EGD AND COLONOSCOPY;  Surgeon: Liz Rossi MD;  Location: [facility]; Service: Gastroenterology    WISDOM TOOTH EXTRACTION      all four       Family History:  Family History   Problem Relation Age of Onset    Anxiety disorder Mother     No Known Problems Father     Hypertension Maternal Grandmother     Mental illness Maternal Grandmother     Scleroderma Paternal Grandmother     Cancer Paternal Grandfather         multiple mylenoma     Mental illness Paternal Grandfather     Sjogren's syndrome Paternal Grandfather     No Known Problems Brother        Social History:  Social History     Socioeconomic History    Marital status: /Civil Union     Spouse name: Jamaal English Number of children: Not on file    Years of education: Not on file    Highest education level:  Bachelor's degree (e g , BA, AB, BS)   Occupational History    Occupation: RN   Tobacco Use    Smoking status: Never Smoker    Smokeless tobacco: Never Used   Vaping Use    Vaping Use: Never used   Substance and Sexual Activity    Alcohol use: Yes     Comment: socially    Drug use: Never    Sexual activity: Not Currently     Partners: Male     Birth control/protection: None   Other Topics Concern    Not on file   Social History Narrative    Always uses seatbelts    Caffeine use     Social Determinants of Health     Financial Resource Strain:     Difficulty of Paying Living Expenses:    Food Insecurity:     Worried About Running Out of Food in the Last Year:     [address] in the Last Year:    Transportation Needs:     Lack of Transportation (Medical):  Lack of Transportation (Non-Medical):    Physical Activity:     Days of Exercise per Week:     Minutes of Exercise per Session:    Stress:     Feeling of Stress :    Social Connections:     Frequency of Communication with Friends and Family:     Frequency of Social Gatherings with Friends and Family:     Attends [employer]oro[employer] Services:     Active Member of Clubs or Organizations:     Attends Club or Organization Meetings:     Marital Status:    Intimate Partner Violence:     Fear of Current or Ex-Partner:     Emotionally Abused:     Physically Abused:     Sexually Abused:        Objective     Vitals:    09/03/21 1453   BP: 114/60   Pulse: 99   Resp: 16   Temp: 97 8 °F (36 6 °C)   SpO2: 98%     Wt Readings from Last 3 Encounters:   09/03/21 54 4 kg (120 lb)   04/20/21 59 9 kg (132 lb)   04/01/21 60 8 kg (134 lb)       Physical Exam  Constitutional:       General: She is not in acute distress  Appearance: Normal appearance  She is not ill-appearing  HENT:      Head: Normocephalic and atraumatic  Eyes:      General:         Right eye: No discharge  Left eye: No discharge  Extraocular Movements: Extraocular movements intact  Conjunctiva/sclera: Conjunctivae normal       Pupils: Pupils are equal, round, and reactive to light  Neck:      Vascular: No carotid bruit  Cardiovascular:      Rate and Rhythm: Normal rate and regular rhythm  Heart sounds: Normal heart sounds  No murmur heard       Pulmonary:      Effort: Pulmonary effort is normal       Breath sounds: Normal breath sounds  No wheezing, rhonchi or rales  Abdominal:      General: Abdomen is flat  Bowel sounds are normal  There is no distension  Palpations: Abdomen is soft  Tenderness: There is no abdominal tenderness  There is no guarding or rebound  Musculoskeletal:      Right lower leg: No edema  Left lower leg: No edema  Lymphadenopathy:      Cervical: No cervical adenopathy  Skin:     Findings: No rash  Neurological:      General: No focal deficit present  Mental Status: She is alert and oriented to person, place, and time  Cranial Nerves: No cranial nerve deficit  Psychiatric:         Mood and Affect: Mood normal          Behavior: Behavior normal          Thought Content:  Thought content normal          Judgment: Judgment normal          Pertinent Laboratory/Diagnostic Studies:  Lab Results   Component Value Date    BUN 8 04/30/2019    CREATININE 0 64 04/30/2019    CALCIUM 8 6 04/30/2019    K 3 8 04/30/2019    CO2 28 04/30/2019     04/30/2019     Lab Results   Component Value Date    ALT 19 04/30/2019    AST 16 04/30/2019    ALKPHOS 66 04/30/2019       Lab Results   Component Value Date    WBC 14 90 (H) 03/21/2021    HGB 11 4 (L) 03/21/2021    HCT 34 9 03/21/2021    MCV 86 03/21/2021     03/21/2021       No results found for: TSH    No results found for: CHOL  Lab Results   Component Value Date    TRIG 124 08/14/2018     Lab Results   Component Value Date    HDL 46 08/14/2018     Lab Results   Component Value Date    LDLCALC 65 08/14/2018     Lab Results   Component Value Date    HGBA1C 4 6 08/14/2018       Results for orders placed or performed in visit on 04/01/21   Urine culture    Specimen: Urine, Clean Catch   Result Value Ref Range    Urine Culture >100,000 cfu/ml Escherichia coli (A)        Susceptibility    Escherichia coli - ENMA     ZID Performed Yes       Ampicillin ($$) <=8 00 Susceptible ug/ml     Aztreonam ($$$)  <=4 Susceptible ug/ml     Cefazolin ($) <=2 00 Susceptible ug/ml     Ciprofloxacin ($)  <=1 00 Susceptible ug/ml     Gentamicin ($$) <=1 Susceptible ug/ml     Levofloxacin ($) <=0 25 Susceptible ug/ml     Nitrofurantoin <=32 Susceptible ug/ml     Tetracycline <=4 Susceptible ug/ml     Tobramycin ($) 2 Susceptible ug/ml     Trimethoprim + Sulfamethoxazole ($$$) <=2/38 Susceptible ug/ml   POCT urine dip auto non-scope   Result Value Ref Range     COLOR,UA yellow     CLARITY,UA cloudy     SPECIFIC GRAVITY,UA 1 030      PH,UA 6 0     LEUKOCYTE ESTERASE,UA trace     NITRITE,UA neg     GLUCOSE, UA neg     KETONES,UA neg     BILIRUBIN,UA neg     BLOOD,UA 3+     POCT URINE PROTEIN neg     SL AMB POCT UROBILINOGEN neg        Orders Placed This Encounter   Procedures    Comprehensive metabolic panel    TSH, 3rd generation    Sedimentation rate, automated    ASHISH Screen w/ Reflex to Titer/Pattern    HLA-B27 antigen    Magnesium       ALLERGIES:  No Known Allergies    Current Medications     Current Outpatient Medications   Medication Sig Dispense Refill    Butalbital-APAP-Caffeine (Fioricet) -40 MG CAPS       docusate sodium (Colace) 100 mg capsule       ergocalciferol (ERGOCALCIFEROL) 1 25 MG (11231 UT) capsule Vitamin D2 1,250 mcg (50,000 unit) capsule      ibuprofen (MOTRIN) 600 mg tablet ibuprofen 600 mg tablet      nitrofurantoin (MACROBID) 100 mg capsule nitrofurantoin monohydrate/macrocrystals 100 mg capsule      Prenatal Multivit-Min-Fe-FA (PRE-HAMIDA FORMULA) TABS Take by mouth daily      sertraline (ZOLOFT) 100 mg tablet Take 1 tablet (100 mg total) by mouth daily 30 tablet 1     No current facility-administered medications for this visit           Health Maintenance     Health Maintenance   Topic Date Due    Annual Physical  2019    Influenza Vaccine (1) 2021    Cervical Cancer Screening  2022    Depression Screening PHQ  2022    BMI: Adult  2022    DTaP,Tdap,and Td Vaccines (8 - Td or Tdap) 01/04/2031    HIV Screening  Completed    Hepatitis C Screening  Completed    HIB Vaccine  Completed    Hepatitis B Vaccine  Completed    IPV Vaccine  Completed    COVID-19 Vaccine  Completed    Pneumococcal Vaccine: Pediatrics (0 to 5 Years) and At-Risk Patients (6 to 59 Years)  Aged Out    Hepatitis A Vaccine  Aged Out    Meningococcal ACWY Vaccine  Aged Out    HPV Vaccine  Aged [employer] History   Administered Date(s) Administered    DTaP 1992, 01/11/1993, 03/11/1993, 03/28/1994, 03/26/1998    Hep A, adult 08/03/2019, 02/03/2020    Hep B, adult 1992, 1992, 06/14/1993    HiB 1992, 01/11/1993, 03/11/1993, 12/17/1993    INFLUENZA 10/16/2011, 10/05/2018, 10/02/2019    Influenza, injectable, quadrivalent, preservative free 0 5 mL 10/08/2020    Influenza, seasonal, injectable 10/16/2011    MMR 12/17/1993, 03/26/1998    Meningococcal MCV4P 01/13/2020    OPV 1992, 01/11/1993, 03/28/1994, 03/26/1998    SARS-CoV-2 / COVID-19 mRNA IM ([facility]) 04/06/2021, 04/27/2021    Tdap 01/01/2015, 01/04/2021    Typhoid, Unspecified 01/22/2020    Varicella 03/01/1994    Yellow Fever 11/44/2707       Nasir Guerrero MD

## 2021-09-06 NOTE — ASSESSMENT & PLAN NOTE
Myalgias  Patient with vague muscle discomfort of lower extremities especially at bedtime  Patient will obtain blood work as ordered for further evaluation    If blood work is within normal limits patient may consider consultation with Sleep Department for possibility of restless legs syndrome

## 2021-09-06 NOTE — ASSESSMENT & PLAN NOTE
Well adult  Overall the patient appears to be in stable health  She was recommended to try and incorporate a more regular form of aerobic exercise for 20 minutes 2-3 days a week    Her gynecology exam is up-to-date

## 2021-11-02 ENCOUNTER — APPOINTMENT (OUTPATIENT)
Dept: URGENT CARE | Age: 31
End: 2021-11-02
Payer: COMMERCIAL

## 2021-11-02 ENCOUNTER — APPOINTMENT (OUTPATIENT)
Dept: LAB | Age: 31
End: 2021-11-02
Payer: COMMERCIAL

## 2021-11-02 DIAGNOSIS — M79.10 MYALGIA: ICD-10-CM

## 2021-11-02 LAB
ALBUMIN SERPL BCP-MCNC: 4.5 G/DL (ref 3.5–5)
ALP SERPL-CCNC: 108 U/L (ref 46–116)
ALT SERPL W P-5'-P-CCNC: 20 U/L (ref 12–78)
ANION GAP SERPL CALCULATED.3IONS-SCNC: 3 MMOL/L (ref 4–13)
AST SERPL W P-5'-P-CCNC: 16 U/L (ref 5–45)
BILIRUB SERPL-MCNC: 0.37 MG/DL (ref 0.2–1)
BUN SERPL-MCNC: 10 MG/DL (ref 5–25)
CALCIUM SERPL-MCNC: 9.2 MG/DL (ref 8.3–10.1)
CHLORIDE SERPL-SCNC: 105 MMOL/L (ref 100–108)
CO2 SERPL-SCNC: 28 MMOL/L (ref 21–32)
CREAT SERPL-MCNC: 0.65 MG/DL (ref 0.6–1.3)
ERYTHROCYTE [SEDIMENTATION RATE] IN BLOOD: 7 MM/HOUR (ref 0–19)
GFR SERPL CREATININE-BSD FRML MDRD: 120 ML/MIN/1.73SQ M
GLUCOSE P FAST SERPL-MCNC: 81 MG/DL (ref 65–99)
MAGNESIUM SERPL-MCNC: 2.3 MG/DL (ref 1.6–2.6)
POTASSIUM SERPL-SCNC: 4 MMOL/L (ref 3.5–5.3)
PROT SERPL-MCNC: 8 G/DL (ref 6.4–8.2)
SODIUM SERPL-SCNC: 136 MMOL/L (ref 136–145)
TSH SERPL DL<=0.05 MIU/L-ACNC: 1.99 UIU/ML (ref 0.36–3.74)

## 2021-11-02 PROCEDURE — 81374 HLA I TYPING 1 ANTIGEN LR: CPT

## 2021-11-02 PROCEDURE — 36415 COLL VENOUS BLD VENIPUNCTURE: CPT

## 2021-11-02 PROCEDURE — 84443 ASSAY THYROID STIM HORMONE: CPT

## 2021-11-02 PROCEDURE — 85652 RBC SED RATE AUTOMATED: CPT

## 2021-11-02 PROCEDURE — 83735 ASSAY OF MAGNESIUM: CPT

## 2021-11-02 PROCEDURE — 80053 COMPREHEN METABOLIC PANEL: CPT

## 2021-11-02 PROCEDURE — 86038 ANTINUCLEAR ANTIBODIES: CPT

## 2021-11-03 LAB — RYE IGE QN: NEGATIVE

## 2021-11-09 ENCOUNTER — TELEPHONE (OUTPATIENT)
Dept: FAMILY MEDICINE CLINIC | Facility: CLINIC | Age: 31
End: 2021-11-09

## 2021-11-10 ENCOUNTER — ANNUAL EXAM (OUTPATIENT)
Dept: OBGYN CLINIC | Facility: CLINIC | Age: 31
End: 2021-11-10
Payer: COMMERCIAL

## 2021-11-10 VITALS
HEIGHT: 65 IN | SYSTOLIC BLOOD PRESSURE: 116 MMHG | WEIGHT: 120.8 LBS | DIASTOLIC BLOOD PRESSURE: 64 MMHG | BODY MASS INDEX: 20.12 KG/M2

## 2021-11-10 DIAGNOSIS — Z01.419 ENCOUNTER FOR WELL WOMAN EXAM WITH ROUTINE GYNECOLOGICAL EXAM: Primary | ICD-10-CM

## 2021-11-10 LAB — HLA-B27 QL NAA+PROBE: NEGATIVE

## 2021-11-10 PROCEDURE — S0612 ANNUAL GYNECOLOGICAL EXAMINA: HCPCS | Performed by: OBSTETRICS & GYNECOLOGY

## 2021-11-10 RX ORDER — TRIAMCINOLONE ACETONIDE 0.25 MG/G
CREAM TOPICAL
COMMUNITY
Start: 2021-10-29 | End: 2022-05-05 | Stop reason: ALTCHOICE

## 2021-11-11 ENCOUNTER — TELEPHONE (OUTPATIENT)
Dept: FAMILY MEDICINE CLINIC | Facility: CLINIC | Age: 31
End: 2021-11-11

## 2022-03-03 ENCOUNTER — OFFICE VISIT (OUTPATIENT)
Dept: FAMILY MEDICINE CLINIC | Facility: CLINIC | Age: 32
End: 2022-03-03
Payer: COMMERCIAL

## 2022-03-03 VITALS
DIASTOLIC BLOOD PRESSURE: 60 MMHG | HEIGHT: 65 IN | SYSTOLIC BLOOD PRESSURE: 94 MMHG | HEART RATE: 97 BPM | WEIGHT: 121 LBS | RESPIRATION RATE: 16 BRPM | BODY MASS INDEX: 20.16 KG/M2 | OXYGEN SATURATION: 99 % | TEMPERATURE: 98.2 F

## 2022-03-03 DIAGNOSIS — G43.709 CHRONIC MIGRAINE WITHOUT AURA WITHOUT STATUS MIGRAINOSUS, NOT INTRACTABLE: Primary | ICD-10-CM

## 2022-03-03 DIAGNOSIS — F41.8 DEPRESSION WITH ANXIETY: ICD-10-CM

## 2022-03-03 DIAGNOSIS — F32.81 PMDD (PREMENSTRUAL DYSPHORIC DISORDER): ICD-10-CM

## 2022-03-03 PROCEDURE — 1036F TOBACCO NON-USER: CPT | Performed by: FAMILY MEDICINE

## 2022-03-03 PROCEDURE — 99214 OFFICE O/P EST MOD 30 MIN: CPT | Performed by: FAMILY MEDICINE

## 2022-03-03 PROCEDURE — 3008F BODY MASS INDEX DOCD: CPT | Performed by: FAMILY MEDICINE

## 2022-03-03 PROCEDURE — 3725F SCREEN DEPRESSION PERFORMED: CPT | Performed by: FAMILY MEDICINE

## 2022-03-03 RX ORDER — SUMATRIPTAN 100 MG/1
100 TABLET, FILM COATED ORAL DAILY PRN
Qty: 9 TABLET | Refills: 2 | Status: SHIPPED | OUTPATIENT
Start: 2022-03-03 | End: 2022-05-05 | Stop reason: ALTCHOICE

## 2022-03-03 RX ORDER — CALCIPOTRIENE 50 UG/G
OINTMENT TOPICAL
COMMUNITY
Start: 2021-12-10 | End: 2022-05-05 | Stop reason: ALTCHOICE

## 2022-03-03 RX ORDER — BUTALBITAL, ACETAMINOPHEN AND CAFFEINE 300; 40; 50 MG/1; MG/1; MG/1
CAPSULE ORAL
Qty: 30 CAPSULE | Refills: 2 | Status: SHIPPED | OUTPATIENT
Start: 2022-03-03

## 2022-03-03 RX ORDER — SUMATRIPTAN 100 MG/1
TABLET, FILM COATED ORAL
COMMUNITY
Start: 2022-01-07 | End: 2022-03-03 | Stop reason: SDUPTHER

## 2022-03-03 NOTE — PROGRESS NOTES
FAMILY PRACTICE OFFICE VISIT       NAME: Madeline Garcia  AGE: 36 y o  SEX: female       : 1990        MRN: [id number]        Assessment and Plan     1  Chronic migraine without aura without status migrainosus, not intractable  Assessment & Plan:  Patient experiences at least 2 migraine headaches per week  Currently controls with Imitrex  Patient is planning to use Fioricet for migraine headaches during pregnancy  No recent follow-up with Neurology, usually sees Dr Ciera Yepez    Orders:  -     SUMAtriptan (IMITREX) 100 mg tablet; Take 1 tablet (100 mg total) by mouth daily as needed for migraine  -     Butalbital-APAP-Caffeine (Fioricet) -40 MG CAPS; Take 1 tablet every 4 to 6 hours as needed for migraine headache    2  Depression with anxiety  Assessment & Plan:  Sub optimal control of symptoms on Zoloft 150 mg daily  Patient reports significant worsening of migraines and anxiety/depression during premenstrual week  We will explore options of alternative SSRI with OBGYN and clinical pharmacist (Prozac, Lexapro?)  We may also consider increasing dose of sertraline up to 200 mg 1 week prior to menstrual cycle and then reduce dose 150 mg for the rest of the month      3  PMDD (premenstrual dysphoric disorder)  Assessment & Plan:  Patient reports worsening of PMDD symptoms lately  Continue Zoloft 150 mg daily  I will explore options of alternative SSRI therapy with OBGYN team and /or clinical pharmacist             There are no Patient Instructions on file for this visit  No follow-ups on file  Discussed with the patient and all questioned fully answered  She will call me if any problems arise  M*Modal software was used to dictate this note  It may contain errors with dictating incorrect words/spelling  Please contact provider directly with any questions         Chief Complaint     Chief Complaint   Patient presents with    Depression     Increased recently    Anxiety    Migraine  Medication Management     Would like to discuss changes       History of Present Illness      New patient to me   Saw Tone Grace for a physical   Patient has almost a 3year old son, not breastfeeding      Chronic migraines within 5-6 years    ObGYN recommended Fioricet in place of Imitrex during pregnancy  Patient is planning 2 nd pregnancy and would like RF for both Imitrex and Fioricet    Migraine headaches were very well controlled while she was pregnant  Zoloft was started by Toby Leslie in a past - no recent f/ups due to family planning/ pregnancy  Started Zoloft shortly before pregnancy  Patient has noticed worsening of depression and anxiety shortly after stopping breastfeeding  Migraines 2 days per week, imitrex helps  Admits to PMDD and seasonal mood disorder  Patient tried  Celexa at age of 19,and then was switched to Prozac due to worsening of migraines  Subsequently Prozac was d/c ed and changed to Zoloft due to safety profile during pregnancy and nursing    [facility]-  Dr Bonilla      Depression    Anxiety  Symptoms include nervous/anxious behavior  Migraine         Review of Systems   Review of Systems   Constitutional: Negative  HENT: Negative  Eyes: Negative  Respiratory: Negative  Cardiovascular: Negative  Gastrointestinal: Negative  Endocrine: Negative  Genitourinary: Negative  Musculoskeletal: Negative  Allergic/Immunologic: Negative  Neurological: Negative  Hematological: Negative  Psychiatric/Behavioral: Positive for depression and dysphoric mood  The patient is nervous/anxious          Active Problem List     Patient Active Problem List   Diagnosis    Chronic migraine without aura, not intractable    Depression with anxiety    Vitamin D deficiency    Well adult exam    Myalgia    PMDD (premenstrual dysphoric disorder)       Past Medical History:  Past Medical History:   Diagnosis Date    Abnormal Pap smear of cervix     Anal fissure     Anxiety     Constipation     Depression     Headache     migranes    IBS (irritable bowel syndrome)     Migraine     Varicella        Past Surgical History:  Past Surgical History:   Procedure Laterality Date    COLPOSCOPY      GYNECOLOGIC CRYOSURGERY      [place] COLONOSCOPY FLX DX W/COLLJ SPEC WHEN PFRMD N/A 8/24/2016    Procedure: EGD AND COLONOSCOPY;  Surgeon: Marianna Tejeda MD;  Location:  [facility]; Service: Gastroenterology    WISDOM TOOTH EXTRACTION      all four       Family History:  Family History   Problem Relation Age of Onset    Anxiety disorder Mother     Uterine cancer Mother     No Known Problems Father     Hypertension Maternal Grandmother     Mental illness Maternal Grandmother     Scleroderma Paternal Grandmother     Cancer Paternal Grandfather         multiple mylenoma     Mental illness Paternal Grandfather     Sjogren's syndrome Paternal Grandfather     No Known Problems Brother        Social History:  Social History     Socioeconomic History    Marital status: /Civil Union     Spouse name: Akiko Angeles Number of children: Not on file    Years of education: Not on file    Highest education level:  Bachelor's degree (e g , BA, AB, BS)   Occupational History    Occupation: RN   Tobacco Use    Smoking status: Never Smoker    Smokeless tobacco: Never Used   Vaping Use    Vaping Use: Never used   Substance and Sexual Activity    Alcohol use: Yes     Comment: socially    Drug use: Never    Sexual activity: Yes     Partners: Male     Birth control/protection: None   Other Topics Concern    Not on file   Social History Narrative    Always uses seatbelts    Caffeine use     Social Determinants of Health     Financial Resource Strain: Not on file   Food Insecurity: Not on file   Transportation Needs: Not on file   Physical Activity: Not on file   Stress: Not on file   Social Connections: Not on file   Intimate Partner Violence: Not on file   Housing Stability: Not on file         Objective     Vitals:    03/03/22 0851   BP: 94/60   BP Location: Left arm   Patient Position: Sitting   Cuff Size: Standard   Pulse: 97   Resp: 16   Temp: 98 2 °F (36 8 °C)   TempSrc: Temporal   SpO2: 99%   Weight: 54 9 kg (121 lb)   Height: 5' 5" (1 651 m)       Wt Readings from Last 3 Encounters:   03/03/22 54 9 kg (121 lb)   11/10/21 54 8 kg (120 lb 12 8 oz)   09/03/21 54 4 kg (120 lb)       Physical Exam  Vitals and nursing note reviewed  Constitutional:       General: She is not in acute distress  Appearance: Normal appearance  She is not ill-appearing  Cardiovascular:      Rate and Rhythm: Normal rate and regular rhythm  Neurological:      General: No focal deficit present  Mental Status: She is alert and oriented to person, place, and time  Psychiatric:         Mood and Affect: Mood normal          Behavior: Behavior normal           Pertinent Laboratory/Diagnostic Studies:    Lab Results   Component Value Date    WBC 14 90 (H) 03/21/2021    HGB 11 4 (L) 03/21/2021    HCT 34 9 03/21/2021    MCV 86 03/21/2021     03/21/2021       No results found for: TSH    No results found for: CHOL  Lab Results   Component Value Date    TRIG 124 08/14/2018     Lab Results   Component Value Date    HDL 46 08/14/2018     Lab Results   Component Value Date    LDLCALC 65 08/14/2018     Lab Results   Component Value Date    HGBA1C 4 6 08/14/2018     Lab Results   Component Value Date    SODIUM 136 11/02/2021    K 4 0 11/02/2021     11/02/2021    CO2 28 11/02/2021    AGAP 3 (L) 11/02/2021    BUN 10 11/02/2021    CREATININE 0 65 11/02/2021    GLUC 78 04/30/2019    GLUF 81 11/02/2021    CALCIUM 9 2 11/02/2021    AST 16 11/02/2021    ALT 20 11/02/2021    ALKPHOS 108 11/02/2021    TP 8 0 11/02/2021    TBILI 0 37 11/02/2021    EGFR 120 11/02/2021       No orders of the defined types were placed in this encounter        ALLERGIES:  No Known Allergies    Current Medications Current Outpatient Medications   Medication Sig Dispense Refill    Butalbital-APAP-Caffeine (Fioricet) -40 MG CAPS Take 1 tablet every 4 to 6 hours as needed for migraine headache 30 capsule 2    calcipotriene (DOVONOX) 0 005 % ointment       ergocalciferol (ERGOCALCIFEROL) 1 25 MG (74030 UT) capsule Vitamin D2 1,250 mcg (50,000 unit) capsule      ibuprofen (MOTRIN) 600 mg tablet ibuprofen 600 mg tablet      Prenatal Multivit-Min-Fe-FA (PRE-HAMIDA FORMULA) TABS Take by mouth daily      sertraline (ZOLOFT) 100 mg tablet Take 1 tablet (100 mg total) by mouth daily (Patient taking differently: Take 100 mg by mouth daily **150mg ) 30 tablet 1    SUMAtriptan (IMITREX) 100 mg tablet Take 1 tablet (100 mg total) by mouth daily as needed for migraine 9 tablet 2    triamcinolone (KENALOG) 0 025 % cream        No current facility-administered medications for this visit         Medications Discontinued During This Encounter   Medication Reason    docusate sodium (Colace) 100 mg capsule Therapy completed    Butalbital-APAP-Caffeine (Fioricet) -40 MG CAPS Reorder    SUMAtriptan (IMITREX) 100 mg tablet Reorder       Health Maintenance     Health Maintenance   Topic Date Due    Cervical Cancer Screening  2022    Annual Physical  2022    BMI: Adult  2023    DTaP,Tdap,and Td Vaccines (8 - Td or Tdap) 2031    HIV Screening  Completed    Hepatitis C Screening  Completed    HIB Vaccine  Completed    Hepatitis B Vaccine  Completed    IPV Vaccine  Completed    Influenza Vaccine  Completed    COVID-19 Vaccine  Completed    Pneumococcal Vaccine: Pediatrics (0 to 5 Years) and At-Risk Patients (6 to 59 Years)  Aged Out    Hepatitis A Vaccine  Aged Out    Meningococcal ACWY Vaccine  Aged Out    HPV Vaccine  Aged [employer] History   Administered Date(s) Administered    COVID-19 PFIZER VACCINE 0 3 ML IM 2021, 2021, 2021    DTaP 1992, 01/11/1993, 03/11/1993, 03/28/1994, 03/26/1998    Hep A, adult 08/03/2019, 02/03/2020    Hep B, adult 1992, 1992, 06/14/1993    HiB 1992, 01/11/1993, 03/11/1993, 12/17/1993    INFLUENZA 10/16/2011, 10/05/2018, 10/02/2019, 11/18/2021    Influenza, injectable, quadrivalent, preservative free 0 5 mL 10/08/2020    Influenza, seasonal, injectable 10/16/2011    MMR 12/17/1993, 03/26/1998    Meningococcal MCV4P 01/13/2020    OPV 1992, 01/11/1993, 03/28/1994, 03/26/1998    Tdap 01/01/2015, 01/04/2021    Typhoid, Unspecified 01/22/2020    Varicella 03/01/1994    Yellow Fever 12/27/2019       Jessica Lynn MD

## 2022-03-03 NOTE — ASSESSMENT & PLAN NOTE
Sub optimal control of symptoms on Zoloft 150 mg daily  Patient reports significant worsening of migraines and anxiety/depression during premenstrual week  We will explore options of alternative SSRI with OBGYN and clinical pharmacist (Prozac, Lexapro?)    We may also consider increasing dose of sertraline up to 200 mg 1 week prior to menstrual cycle and then reduce dose 150 mg for the rest of the month

## 2022-03-03 NOTE — Clinical Note
Hello,  Our mutual patient presents with worsening of PMDD and migraine symptoms  She has been using Zoloft 150 mg daily  She is planning 2nd pregnancy  With that in mind, I did not want to switch her medications without consulting you first   Would any other SSRI be acceptable? Alternatively,I can increase dose of Zoloft up to 200 mg x 1 week during menses and then reduce to 150 mg for the rest of the month    Thank you  Denita Street

## 2022-03-06 NOTE — ASSESSMENT & PLAN NOTE
Patient experiences at least 2 migraine headaches per week  Currently controls with Imitrex  Patient is planning to use Fioricet for migraine headaches during pregnancy      No recent follow-up with Neurology, usually sees Dr Kristi Barcenas

## 2022-03-06 NOTE — ASSESSMENT & PLAN NOTE
Patient reports worsening of PMDD symptoms lately  Continue Zoloft 150 mg daily      I will explore options of alternative SSRI therapy with OBGYN team and /or clinical pharmacist

## 2022-03-25 ENCOUNTER — TELEPHONE (OUTPATIENT)
Dept: FAMILY MEDICINE CLINIC | Facility: CLINIC | Age: 32
End: 2022-03-25

## 2022-03-25 DIAGNOSIS — F32.81 PMDD (PREMENSTRUAL DYSPHORIC DISORDER): Primary | ICD-10-CM

## 2022-03-25 RX ORDER — ESCITALOPRAM OXALATE 10 MG/1
10 TABLET ORAL DAILY
Qty: 30 TABLET | Refills: 1 | Status: SHIPPED | OUTPATIENT
Start: 2022-03-25 | End: 2022-04-17

## 2022-03-25 NOTE — TELEPHONE ENCOUNTER
Freya Pump,    Please contact patient  I send her my chart message regarding switch from Zoloft to Lexapro  Please make sure she received it  If she has any questions about this schedule that I have provided-please let me know  I am sending prescription for Lexapro this morning      Thank you

## 2022-03-25 NOTE — TELEPHONE ENCOUNTER
Pt called back and I explained to her verbatim & in great detail Dr Claude Baez instructions for medication changes and plan  Patient is agreeable to the plan, she had no further questions, and is aware to call us back or send a [facility] message if any questions or concerns arise  Patient aware these instructions were also sent to her directly in [facility]

## 2022-04-17 DIAGNOSIS — F32.81 PMDD (PREMENSTRUAL DYSPHORIC DISORDER): ICD-10-CM

## 2022-04-17 RX ORDER — ESCITALOPRAM OXALATE 10 MG/1
TABLET ORAL
Qty: 30 TABLET | Refills: 1 | Status: SHIPPED | OUTPATIENT
Start: 2022-04-17 | End: 2022-05-22

## 2022-05-05 ENCOUNTER — OFFICE VISIT (OUTPATIENT)
Dept: OBGYN CLINIC | Facility: CLINIC | Age: 32
End: 2022-05-05
Payer: COMMERCIAL

## 2022-05-05 VITALS — BODY MASS INDEX: 21.13 KG/M2 | WEIGHT: 127 LBS | SYSTOLIC BLOOD PRESSURE: 116 MMHG | DIASTOLIC BLOOD PRESSURE: 70 MMHG

## 2022-05-05 DIAGNOSIS — N91.2 AMENORRHEA: Primary | ICD-10-CM

## 2022-05-05 LAB — SL AMB POCT URINE HCG: POSITIVE

## 2022-05-05 PROCEDURE — 1036F TOBACCO NON-USER: CPT | Performed by: PHYSICIAN ASSISTANT

## 2022-05-05 PROCEDURE — 99214 OFFICE O/P EST MOD 30 MIN: CPT | Performed by: PHYSICIAN ASSISTANT

## 2022-05-05 PROCEDURE — 76801 OB US < 14 WKS SINGLE FETUS: CPT | Performed by: PHYSICIAN ASSISTANT

## 2022-05-05 PROCEDURE — 81025 URINE PREGNANCY TEST: CPT | Performed by: PHYSICIAN ASSISTANT

## 2022-05-05 NOTE — PROGRESS NOTES
Assessment/Plan:  - Viable IUP @ 7w5d today  - TAVO 22  - Continue PNV  - Recommend Vit B6 for nausea  - Call for concerns  - RTO 2 weeks for OB intake     Diagnoses and all orders for this visit:    Amenorrhea  -     POCT urine HCG  -     AMB US OB < 14 weeks single or first gestation level 1  -     Ambulatory Referral to Maternal Fetal Medicine; Future    Other orders  -     Docusate Sodium (COLACE PO); Take by mouth        Subjective:      Patient ID: Gracie Campos is a 36 y o  female  Gabbie is a 32YO  WF presenting to the office for pregnancy confirmation via [address]  She reports her LMP as 22, placing her at 7w6d with an TAVO of 22  She is feeling nauseous today  The following portions of the patient's history were reviewed and updated as appropriate: allergies, current medications, past family history, past medical history, past social history, past surgical history and problem list     Review of Systems   Constitutional: Positive for fatigue  Negative for chills, fever and unexpected weight change  Respiratory: Negative for shortness of breath  Cardiovascular: Negative for chest pain  Gastrointestinal: Positive for nausea  Negative for abdominal pain, diarrhea and vomiting  Skin: Negative for rash  Objective:      /70   Wt 57 6 kg (127 lb)   LMP 2022 (Exact Date)   Breastfeeding No   BMI 21 13 kg/m²          Physical Exam  Vitals reviewed  Constitutional:       Appearance: Normal appearance  She is normal weight  HENT:      Head: Normocephalic and atraumatic  Pulmonary:      Effort: Pulmonary effort is normal    Genitourinary:     General: Normal vulva  Labia:         Right: No rash or lesion  Left: No rash or lesion  Comments: TVUS reveals IUP, yolk sac, fetal pole, +CM  CRL 1 40 cm (7w5d)   BPM  TAVO 22  Skin:     General: Skin is warm and dry  Neurological:      General: No focal deficit present        Mental Status: She is alert  Psychiatric:         Mood and Affect: Mood normal          Behavior: Behavior normal            Ultrasound Probe Disinfection    A transvaginal ultrasound was performed     Prior to use, disinfection was performed with High Level Disinfection Process (Trophon)  Probe serial number [id number]: [id number] was used    Gt Cordoba PA-C  05/05/22  9:00 AM

## 2022-05-10 ENCOUNTER — VBI (OUTPATIENT)
Dept: ADMINISTRATIVE | Facility: OTHER | Age: 32
End: 2022-05-10

## 2022-05-17 NOTE — PROGRESS NOTES
OB INTAKE INTERVIEW  Pt presents for OB intake  Plan:  - Prenatal labs ordered  - Referral given for MFM at confirmation scan  - Reviewed Genetic testing options   CF: accepted, will start PA   SMA: accepted, will start PA  - Patient to call for concerns  - RTO 3-4 weeks for OB F/U visit and PAP/Cultures    ~Last pap: 2019 NILM       OB History        2    Para   1    Term   1       0    AB   0    Living   1       SAB   0    IAB   0    Ectopic   0    Multiple   0    Live Births   1                 Hx of  delivery prior to 36 weeks 6 days: No      Last Menstrual Period: 3/11/22                          Ultrasound date:   Estimated Date of Delivery:  22     Current Issues:  Constipation :             Yes, reviewed increased hydration, fiber, and activity  Headaches :              No    Cramping:                  No   Spotting :                   no       Interview education  · St  [facility]'s Pregnancy Essentials reviewed and discussed   · Baby and [address] Handout  · [facility]'s MFM Handouts  · Discussed genetic testing  · Prenatal lab work: Scripts printed and given to pt  Prior Pregnancy Delivery Complications              History of  delivery or PPROM: NO  History of Shoulder Dystocia: No              History of vacuum or forceps delivery: no              History of 3rd/4th degree laceration: No              History of  section: No     Diabetes              Pregestational DM: No              hx of GDM: No              BMI >35: No              first degree relative with type 2 diabetes: No              hx of PCOS: No              current metformin use:  No              prior hx of LGA/macrosomia: No               Hypertension              Hx of chronic HTN: No              hx of gestational HTN: No              hx of preeclampsia, eclampsia, or HELLP syndrome: No              Age 28 or older: No              Multifetal gestation: No  Type 1 or Type 2 DM: No  Renal Disease: No  Autoimmune disease (systemic lupus erythematosus, antiphospholipid antibody syndrome): No  Nulliparity: No  Obesity (BMI over 30): No  More than 10 year pregnancy interval: No  Previous IUGR, low birthweight or small for gestational age: No     Immunizations:                influenza vaccine: vaccinated 11/18/21              Covid Vaccination: completed with booster  Discussed Tdap vaccine administration at 27-28 weeks                   Immunization History   Administered Date(s) Administered    COVID-19 PFIZER VACCINE 0 3 ML IM 04/06/2021, 04/27/2021, 11/18/2021    DTaP 1992, 01/11/1993, 03/11/1993, 03/28/1994, 03/26/1998    Hep A, adult 08/03/2019, 02/03/2020    Hep B, adult 1990, 1990, 06/14/1993    HiB 1992, 01/11/1993, 03/11/1993, 12/17/1993    INFLUENZA 10/16/2011, 10/05/2018, 10/02/2019, 11/18/2021    Influenza, injectable, quadrivalent, preservative free 0 5 mL 10/08/2020    Influenza, seasonal, injectable 10/16/2011    MMR 12/17/1993, 03/26/1998    Meningococcal MCV4P 01/13/2020    OPV 1992, 01/11/1993, 03/28/1994, 03/26/1998    Tdap 01/01/2015, 01/04/2021    Typhoid, Unspecified 01/22/2020    Varicella 03/01/1994    Yellow Fever 12/27/2019            Dental visit with last 6 months: 0  PHQ-2/9 score: MyChart activated (not 1518 years of age)?:     The patient was oriented to our practice and all questions were answered  132 --> 129 today  - 2/2 volume overloaded   - IV Lasix 20 mg daily - Echo (6/22) - Mild concentric LVH with moderate global LV systolic dysfunction with LVEF of 40%, grossly normal RV size and systolic functio, calcified trileaflet aortic valve with severe aortic stenosis, mild to moderate MR, mild TR.   - Cardio consulted (torrey), recs appreciated - Echo (6/22) - Mild concentric LVH with moderate global LV systolic dysfunction with LVEF of 40%, grossly normal RV size and systolic functio, calcified trileaflet aortic valve with severe aortic stenosis, mild to moderate MR, mild TR.   - Cardio DR Goncalves d/w both sons at bed side at detail about ECHO results & different treatment options including TAVR as out pt.

## 2022-05-18 ENCOUNTER — INITIAL PRENATAL (OUTPATIENT)
Dept: OBGYN CLINIC | Facility: CLINIC | Age: 32
End: 2022-05-18

## 2022-05-18 ENCOUNTER — TELEPHONE (OUTPATIENT)
Dept: PERINATAL CARE | Facility: OTHER | Age: 32
End: 2022-05-18

## 2022-05-18 ENCOUNTER — LAB (OUTPATIENT)
Dept: LAB | Facility: AMBULARY SURGERY CENTER | Age: 32
End: 2022-05-18
Attending: OBSTETRICS & GYNECOLOGY
Payer: COMMERCIAL

## 2022-05-18 VITALS — SYSTOLIC BLOOD PRESSURE: 110 MMHG | BODY MASS INDEX: 20.93 KG/M2 | WEIGHT: 125.8 LBS | DIASTOLIC BLOOD PRESSURE: 68 MMHG

## 2022-05-18 DIAGNOSIS — Z34.81 ENCOUNTER FOR SUPERVISION OF OTHER NORMAL PREGNANCY IN FIRST TRIMESTER: Primary | ICD-10-CM

## 2022-05-18 DIAGNOSIS — Z34.81 ENCOUNTER FOR SUPERVISION OF OTHER NORMAL PREGNANCY IN FIRST TRIMESTER: ICD-10-CM

## 2022-05-18 LAB
ABO GROUP BLD: NORMAL
BASOPHILS # BLD AUTO: 0.04 THOUSANDS/ΜL (ref 0–0.1)
BASOPHILS NFR BLD AUTO: 1 % (ref 0–1)
BLD GP AB SCN SERPL QL: NEGATIVE
EOSINOPHIL # BLD AUTO: 0.08 THOUSAND/ΜL (ref 0–0.61)
EOSINOPHIL NFR BLD AUTO: 1 % (ref 0–6)
ERYTHROCYTE [DISTWIDTH] IN BLOOD BY AUTOMATED COUNT: 12.5 % (ref 11.6–15.1)
HBV SURFACE AG SER QL: NORMAL
HCT VFR BLD AUTO: 38.4 % (ref 34.8–46.1)
HCV AB SER QL: NORMAL
HGB BLD-MCNC: 12.4 G/DL (ref 11.5–15.4)
IMM GRANULOCYTES # BLD AUTO: 0.02 THOUSAND/UL (ref 0–0.2)
IMM GRANULOCYTES NFR BLD AUTO: 0 % (ref 0–2)
LYMPHOCYTES # BLD AUTO: 1.42 THOUSANDS/ΜL (ref 0.6–4.47)
LYMPHOCYTES NFR BLD AUTO: 18 % (ref 14–44)
MCH RBC QN AUTO: 29.4 PG (ref 26.8–34.3)
MCHC RBC AUTO-ENTMCNC: 32.3 G/DL (ref 31.4–37.4)
MCV RBC AUTO: 91 FL (ref 82–98)
MONOCYTES # BLD AUTO: 0.75 THOUSAND/ΜL (ref 0.17–1.22)
MONOCYTES NFR BLD AUTO: 10 % (ref 4–12)
NEUTROPHILS # BLD AUTO: 5.54 THOUSANDS/ΜL (ref 1.85–7.62)
NEUTS SEG NFR BLD AUTO: 70 % (ref 43–75)
NRBC BLD AUTO-RTO: 0 /100 WBCS
PLATELET # BLD AUTO: 402 THOUSANDS/UL (ref 149–390)
PMV BLD AUTO: 11.1 FL (ref 8.9–12.7)
RBC # BLD AUTO: 4.22 MILLION/UL (ref 3.81–5.12)
RH BLD: POSITIVE
RUBV IGG SERPL IA-ACNC: >175 IU/ML
SPECIMEN EXPIRATION DATE: NORMAL
WBC # BLD AUTO: 7.85 THOUSAND/UL (ref 4.31–10.16)

## 2022-05-18 PROCEDURE — 86803 HEPATITIS C AB TEST: CPT

## 2022-05-18 PROCEDURE — 87086 URINE CULTURE/COLONY COUNT: CPT

## 2022-05-18 PROCEDURE — 86787 VARICELLA-ZOSTER ANTIBODY: CPT

## 2022-05-18 PROCEDURE — 36415 COLL VENOUS BLD VENIPUNCTURE: CPT

## 2022-05-18 PROCEDURE — 80081 OBSTETRIC PANEL INC HIV TSTG: CPT

## 2022-05-18 PROCEDURE — OBC: Performed by: OBSTETRICS & GYNECOLOGY

## 2022-05-18 NOTE — TELEPHONE ENCOUNTER
Left patient a message that her MFM appointment had to be rescheduled  The new time, date and location were provided - 7/29/22  9:15  HARRIS  The patient has been instructed to please call us back at [phone] with any questions or concerns

## 2022-05-19 LAB
BACTERIA UR CULT: NORMAL
HIV 1+2 AB+HIV1 P24 AG SERPL QL IA: NORMAL
RPR SER QL: NORMAL
VZV IGG SER IA-ACNC: NORMAL

## 2022-05-22 DIAGNOSIS — F32.81 PMDD (PREMENSTRUAL DYSPHORIC DISORDER): ICD-10-CM

## 2022-05-22 RX ORDER — ESCITALOPRAM OXALATE 10 MG/1
TABLET ORAL
Qty: 30 TABLET | Refills: 1 | Status: SHIPPED | OUTPATIENT
Start: 2022-05-22 | End: 2022-06-15

## 2022-05-23 ENCOUNTER — TELEPHONE (OUTPATIENT)
Dept: OBGYN CLINIC | Facility: CLINIC | Age: 32
End: 2022-05-23

## 2022-05-23 DIAGNOSIS — Z31.430 ENCOUNTER OF FEMALE FOR TESTING FOR GENETIC DISEASE CARRIER STATUS FOR PROCREATIVE MANAGEMENT: Primary | ICD-10-CM

## 2022-06-08 ENCOUNTER — ROUTINE PRENATAL (OUTPATIENT)
Dept: PERINATAL CARE | Facility: OTHER | Age: 32
End: 2022-06-08
Payer: COMMERCIAL

## 2022-06-08 ENCOUNTER — APPOINTMENT (OUTPATIENT)
Dept: LAB | Facility: CLINIC | Age: 32
End: 2022-06-08
Payer: COMMERCIAL

## 2022-06-08 VITALS
HEIGHT: 65 IN | HEART RATE: 98 BPM | WEIGHT: 125.44 LBS | BODY MASS INDEX: 20.9 KG/M2 | DIASTOLIC BLOOD PRESSURE: 63 MMHG | SYSTOLIC BLOOD PRESSURE: 100 MMHG

## 2022-06-08 DIAGNOSIS — Z13.79 GENETIC SCREENING: ICD-10-CM

## 2022-06-08 DIAGNOSIS — Z3A.12 12 WEEKS GESTATION OF PREGNANCY: Primary | ICD-10-CM

## 2022-06-08 DIAGNOSIS — Z36.82 NUCHAL TRANSLUCENCY OF FETUS ON PRENATAL ULTRASOUND: ICD-10-CM

## 2022-06-08 DIAGNOSIS — F41.8 DEPRESSION WITH ANXIETY: ICD-10-CM

## 2022-06-08 DIAGNOSIS — Z36.9 UNSPECIFIED ANTENATAL SCREENING: ICD-10-CM

## 2022-06-08 DIAGNOSIS — Z33.1 PREGNANT STATE, INCIDENTAL: ICD-10-CM

## 2022-06-08 PROCEDURE — 3008F BODY MASS INDEX DOCD: CPT | Performed by: OBSTETRICS & GYNECOLOGY

## 2022-06-08 PROCEDURE — 99213 OFFICE O/P EST LOW 20 MIN: CPT | Performed by: OBSTETRICS & GYNECOLOGY

## 2022-06-08 PROCEDURE — 76813 OB US NUCHAL MEAS 1 GEST: CPT | Performed by: OBSTETRICS & GYNECOLOGY

## 2022-06-08 PROCEDURE — 36415 COLL VENOUS BLD VENIPUNCTURE: CPT

## 2022-06-08 PROCEDURE — 1036F TOBACCO NON-USER: CPT | Performed by: OBSTETRICS & GYNECOLOGY

## 2022-06-08 NOTE — LETTER
June 12, 2022     Shaune Boxer, PA-C [address]  [address]  [address] [place] [address]    Patient: Katina Woo   YOB: 1990   Date of Visit: 6/8/2022       Dear [facility]: Thank you for referring Katina Woo to me for evaluation  Below are my notes for this consultation  If you have questions, please do not hesitate to call me  I look forward to following your patient along with you  Sincerely,        Noemi Delgado MD        CC: No Recipients  Noemi Delgado MD  6/12/2022  2:57 PM  Incomplete  A fetal ultrasound was completed  See Ob procedures in Epic for an interpretation and recommendations  Do not hesitate to contact us in [facility] if you have questions  Severino Hickman MD, [address] [place] [address]  Maternal Fetal Medicine

## 2022-06-08 NOTE — PROGRESS NOTES
Patient chose to have [facility] Non-invasive Prenatal Screen  Patient given brochure and is aware [employer] will contact patients insurance and coordinate coverage  Patient made aware she will need to respond to text message or e-mail from [employer] within 2 business days or testing will be run through insurance  Patient informed text message will come from area code  "415"  Provided [address] [place] [address] # [phone] and web site : [email or web address]  Patient given Lab kit and  to take to [facility] lab to have drawn immediately  Copy of lab order scanned to Epic media  Maternal Fetal Medicine will have results in approximately 7-10 business days and will call patient or notify via [address]  Patient aware viewing lab result online will reveal fetal sex If ordered  Patient verbalized understanding of all instructions and no questions at this time

## 2022-06-12 PROBLEM — Z3A.12 12 WEEKS GESTATION OF PREGNANCY: Status: ACTIVE | Noted: 2022-06-12

## 2022-06-12 NOTE — PROGRESS NOTES
A fetal ultrasound was completed  See Ob procedures in Epic for an interpretation and recommendations  Do not hesitate to contact us in [facility] if you have questions  Noemi Lemon MD, [address] [facility]  Maternal Fetal Medicine

## 2022-06-13 LAB — MISCELLANEOUS LAB TEST RESULT: NORMAL

## 2022-06-15 ENCOUNTER — ROUTINE PRENATAL (OUTPATIENT)
Dept: OBGYN CLINIC | Facility: CLINIC | Age: 32
End: 2022-06-15

## 2022-06-15 VITALS
RESPIRATION RATE: 18 BRPM | DIASTOLIC BLOOD PRESSURE: 50 MMHG | BODY MASS INDEX: 25.49 KG/M2 | SYSTOLIC BLOOD PRESSURE: 95 MMHG | OXYGEN SATURATION: 98 % | WEIGHT: 153 LBS | HEIGHT: 65 IN | TEMPERATURE: 98.3 F | HEART RATE: 79 BPM

## 2022-06-15 VITALS — SYSTOLIC BLOOD PRESSURE: 100 MMHG | DIASTOLIC BLOOD PRESSURE: 58 MMHG | BODY MASS INDEX: 20.8 KG/M2 | WEIGHT: 125 LBS

## 2022-06-15 DIAGNOSIS — Z3A.13 13 WEEKS GESTATION OF PREGNANCY: Primary | ICD-10-CM

## 2022-06-15 DIAGNOSIS — Z34.82 ENCOUNTER FOR SUPERVISION OF OTHER NORMAL PREGNANCY IN SECOND TRIMESTER: ICD-10-CM

## 2022-06-15 DIAGNOSIS — Z12.4 PAPANICOLAOU SMEAR FOR CERVICAL CANCER SCREENING: ICD-10-CM

## 2022-06-15 DIAGNOSIS — Z11.3 SCREENING EXAMINATION FOR STD (SEXUALLY TRANSMITTED DISEASE): ICD-10-CM

## 2022-06-15 DIAGNOSIS — F32.81 PMDD (PREMENSTRUAL DYSPHORIC DISORDER): ICD-10-CM

## 2022-06-15 PROCEDURE — G0145 SCR C/V CYTO,THINLAYER,RESCR: HCPCS | Performed by: OBSTETRICS & GYNECOLOGY

## 2022-06-15 PROCEDURE — PNV: Performed by: OBSTETRICS & GYNECOLOGY

## 2022-06-15 PROCEDURE — G0476 HPV COMBO ASSAY CA SCREEN: HCPCS | Performed by: OBSTETRICS & GYNECOLOGY

## 2022-06-15 PROCEDURE — 87491 CHLMYD TRACH DNA AMP PROBE: CPT | Performed by: OBSTETRICS & GYNECOLOGY

## 2022-06-15 PROCEDURE — 87591 N.GONORRHOEAE DNA AMP PROB: CPT | Performed by: OBSTETRICS & GYNECOLOGY

## 2022-06-15 RX ORDER — ESCITALOPRAM OXALATE 10 MG/1
TABLET ORAL
Qty: 90 TABLET | Refills: 1 | Status: SHIPPED | OUTPATIENT
Start: 2022-06-15

## 2022-06-15 NOTE — PATIENT INSTRUCTIONS
Rodrigue Cantu,     Part of routine screening in pregnancy is a maternal AFP level, which is a blood test that should be collected between 16-18 weeks of pregnancy, but can be collected up to 21weeks 6days  We have placed an order for this lab in your chart  If you can use the [facility]'s lab, you can report to the lab and they will be able to access the order  If you need to use an outside lab, please contact the office for a copy of the lab slip  Alpha-fetoprotein (AFP) is a protein produced in the liver of a developing fetus  During a baby's development, some AFP passes through the placenta and into the mother's blood  An AFP test measures the level of AFP in pregnant women during the second trimester of pregnancy  Abnormal levels of AFP in a mother's blood may be sign of a neural tube defect, a condition that causes abnormal development of a developing baby's brain and/or spine  Please contact the office at [phone] with any questions

## 2022-06-16 ENCOUNTER — TELEPHONE (OUTPATIENT)
Dept: PERINATAL CARE | Facility: OTHER | Age: 32
End: 2022-06-16

## 2022-06-16 LAB
HPV HR 12 DNA CVX QL NAA+PROBE: NEGATIVE
HPV16 DNA CVX QL NAA+PROBE: NEGATIVE
HPV18 DNA CVX QL NAA+PROBE: NEGATIVE

## 2022-06-16 NOTE — TELEPHONE ENCOUNTER
----- Message from Kenny Reynoso MD sent at 6/16/2022  5:25 AM EDT -----  I have reviewed the patient's lab results which are normal   Please contact the patient to inform her of the normal results, unless Ms Noelle Diaz has reviewed the results using mychart        Kenny Reynoso MD

## 2022-06-16 NOTE — TELEPHONE ENCOUNTER
Phone call to patient  L/M reporting NIPT results screen negative  Advised patient if they wish to learn the sex of the baby to view it in [address] or call the office and that fetal sex will be confirmed at the time of Level 2 ultrasound

## 2022-06-20 LAB
C TRACH DNA SPEC QL NAA+PROBE: NEGATIVE
N GONORRHOEA DNA SPEC QL NAA+PROBE: NEGATIVE

## 2022-06-21 LAB
LAB AP GYN PRIMARY INTERPRETATION: NORMAL
Lab: NORMAL

## 2022-07-07 ENCOUNTER — ROUTINE PRENATAL (OUTPATIENT)
Dept: PERINATAL CARE | Facility: OTHER | Age: 32
End: 2022-07-07
Payer: COMMERCIAL

## 2022-07-07 VITALS
DIASTOLIC BLOOD PRESSURE: 66 MMHG | HEART RATE: 116 BPM | HEIGHT: 65 IN | WEIGHT: 128.75 LBS | SYSTOLIC BLOOD PRESSURE: 101 MMHG | BODY MASS INDEX: 21.45 KG/M2

## 2022-07-07 DIAGNOSIS — Z36.3 ENCOUNTER FOR ANTENATAL SCREENING FOR MALFORMATION USING ULTRASOUND: ICD-10-CM

## 2022-07-07 DIAGNOSIS — Z3A.16 16 WEEKS GESTATION OF PREGNANCY: Primary | ICD-10-CM

## 2022-07-07 PROCEDURE — 99212 OFFICE O/P EST SF 10 MIN: CPT | Performed by: OBSTETRICS & GYNECOLOGY

## 2022-07-07 PROCEDURE — 76805 OB US >/= 14 WKS SNGL FETUS: CPT | Performed by: OBSTETRICS & GYNECOLOGY

## 2022-07-07 NOTE — PROGRESS NOTES
The patient was seen today for an ultrasound  Please see ultrasound report (located under Ob Procedures) for additional details  Thank you very much for allowing us to participate in the care of this very nice patient  Should you have any questions, please do not hesitate to contact me  Narciso Ball MD [address] [facility]  Attending Physician, Tod

## 2022-07-08 ENCOUNTER — LAB (OUTPATIENT)
Dept: LAB | Facility: AMBULARY SURGERY CENTER | Age: 32
End: 2022-07-08
Attending: OBSTETRICS & GYNECOLOGY
Payer: COMMERCIAL

## 2022-07-08 DIAGNOSIS — Z34.82 ENCOUNTER FOR SUPERVISION OF OTHER NORMAL PREGNANCY IN SECOND TRIMESTER: ICD-10-CM

## 2022-07-08 DIAGNOSIS — Z31.430 ENCOUNTER OF FEMALE FOR TESTING FOR GENETIC DISEASE CARRIER STATUS FOR PROCREATIVE MANAGEMENT: ICD-10-CM

## 2022-07-08 DIAGNOSIS — Z3A.13 13 WEEKS GESTATION OF PREGNANCY: ICD-10-CM

## 2022-07-08 PROCEDURE — 81329 SMN1 GENE DOS/DELETION ALYS: CPT

## 2022-07-08 PROCEDURE — 81220 CFTR GENE COM VARIANTS: CPT

## 2022-07-08 PROCEDURE — 36415 COLL VENOUS BLD VENIPUNCTURE: CPT

## 2022-07-08 PROCEDURE — 82105 ALPHA-FETOPROTEIN SERUM: CPT

## 2022-07-11 LAB
2ND TRIMESTER 4 SCREEN SERPL-IMP: NORMAL
AFP ADJ MOM SERPL: 0.71
AFP INTERP AMN-IMP: NORMAL
AFP INTERP SERPL-IMP: NORMAL
AFP INTERP SERPL-IMP: NORMAL
AFP SERPL-MCNC: 31.9 NG/ML
AGE AT DELIVERY: 30.2 YR
GA METHOD: NORMAL
GA: 17 WEEKS
IDDM PATIENT QL: NO
MULTIPLE PREGNANCY: NO
NEURAL TUBE DEFECT RISK FETUS: NORMAL %

## 2022-07-13 ENCOUNTER — ROUTINE PRENATAL (OUTPATIENT)
Dept: OBGYN CLINIC | Facility: CLINIC | Age: 32
End: 2022-07-13

## 2022-07-13 VITALS — BODY MASS INDEX: 21.8 KG/M2 | SYSTOLIC BLOOD PRESSURE: 100 MMHG | DIASTOLIC BLOOD PRESSURE: 60 MMHG | WEIGHT: 131 LBS

## 2022-07-13 DIAGNOSIS — Z34.82 ENCOUNTER FOR SUPERVISION OF OTHER NORMAL PREGNANCY IN SECOND TRIMESTER: ICD-10-CM

## 2022-07-13 DIAGNOSIS — Z3A.17 17 WEEKS GESTATION OF PREGNANCY: Primary | ICD-10-CM

## 2022-07-13 PROCEDURE — PNV: Performed by: OBSTETRICS & GYNECOLOGY

## 2022-07-13 NOTE — PROGRESS NOTES
36 y o   female at 17w5d (Estimated Date of Delivery: 22) for PNV  Pre-Arie Vitals    Flowsheet Row Most Recent Value   Prenatal Assessment    Fetal Heart Rate 155   Movement Present   Prenatal Vitals    Blood Pressure 100/60   Weight - Scale 59 4 kg (131 lb)   Urine Albumin/Glucose    Dilation/Effacement/Station    Vaginal Drainage    Edema    LLE Edema None   RLE Edema None   Facial Edema None         kg (4 lb)    Smitha Turner presents for return OB visit  Feeling well  Having numbness mainly in L hand and at night  Discussed possible carpel tunnel, wrist brace at night to help  NIPT low risk  AFP screen negative  CF and SMA carrier screening pending  Level 2 US scheduled 22  Follow up in 4 weeks

## 2022-07-13 NOTE — PROGRESS NOTES
Patient states that she is experiencing some bilateral carpel tunnel issues over the last few days but otherwise she is doing good  She has no concerns at this time  Urine dip neg/neg

## 2022-07-15 LAB
CF COMMENT: NORMAL
CFTR MUT ANL BLD/T: NORMAL
CLINICAL INFO: NORMAL
ETHNIC BACKGROUND STATED: NORMAL
GENE MUT TESTED BLD/T: NORMAL
GENERAL COMMENTS:: NORMAL
LAB DIRECTOR NAME PROVIDER: NORMAL
REASON FOR REFERRAL (NARRATIVE): NORMAL
REF LAB TEST METHOD: NORMAL
SL AMB DISCLAIMER: NORMAL
SL AMB GENETIC COUNSELOR: NORMAL
SMN1 GENE MUT ANL BLD/T: NORMAL
SPECIMEN SOURCE: NORMAL

## 2022-07-29 ENCOUNTER — TELEPHONE (OUTPATIENT)
Dept: PERINATAL CARE | Facility: OTHER | Age: 32
End: 2022-07-29

## 2022-07-29 ENCOUNTER — ROUTINE PRENATAL (OUTPATIENT)
Dept: PERINATAL CARE | Facility: OTHER | Age: 32
End: 2022-07-29
Payer: COMMERCIAL

## 2022-07-29 VITALS
HEART RATE: 99 BPM | BODY MASS INDEX: 22.15 KG/M2 | DIASTOLIC BLOOD PRESSURE: 62 MMHG | WEIGHT: 132.94 LBS | SYSTOLIC BLOOD PRESSURE: 112 MMHG | HEIGHT: 65 IN

## 2022-07-29 DIAGNOSIS — Z36.3 ENCOUNTER FOR ANTENATAL SCREENING FOR MALFORMATIONS: ICD-10-CM

## 2022-07-29 DIAGNOSIS — Z36.86 ENCOUNTER FOR ANTENATAL SCREENING FOR CERVICAL LENGTH: ICD-10-CM

## 2022-07-29 DIAGNOSIS — O09.892 SHORT INTERVAL BETWEEN PREGNANCIES COMPLICATING PREGNANCY, ANTEPARTUM, SECOND TRIMESTER: ICD-10-CM

## 2022-07-29 DIAGNOSIS — Z3A.20 20 WEEKS GESTATION OF PREGNANCY: Primary | ICD-10-CM

## 2022-07-29 PROCEDURE — 76805 OB US >/= 14 WKS SNGL FETUS: CPT | Performed by: OBSTETRICS & GYNECOLOGY

## 2022-07-29 PROCEDURE — 76817 TRANSVAGINAL US OBSTETRIC: CPT | Performed by: OBSTETRICS & GYNECOLOGY

## 2022-07-29 NOTE — TELEPHONE ENCOUNTER
LVM for patient to schedule 12 week follow up (AROUND 10/21)  Phone number provided to schedule u/s

## 2022-07-29 NOTE — LETTER
July 29, 2022     Kimberly Truong MD  [address] [facility]  [address]  [facility] [address]    Patient: Chandni Smith   YOB: 1990   Date of Visit: 7/29/2022       Dear Dr Chris Zamora:    Thank you for referring Chandni Smith to me for evaluation  Below are my notes for this consultation  If you have questions, please do not hesitate to call me  I look forward to following your patient along with you  Sincerely,        Otis Butcher MD        CC: No Recipients  Otis Bucther MD  7/28/2022  6:44 AM  Sign when Signing Visit  Please refer to the [facility] ultrasound report in Ob Procedures for additional information regarding today's visit

## 2022-08-11 ENCOUNTER — ROUTINE PRENATAL (OUTPATIENT)
Dept: OBGYN CLINIC | Facility: CLINIC | Age: 32
End: 2022-08-11

## 2022-08-11 VITALS — SYSTOLIC BLOOD PRESSURE: 102 MMHG | BODY MASS INDEX: 22.47 KG/M2 | DIASTOLIC BLOOD PRESSURE: 64 MMHG | WEIGHT: 135 LBS

## 2022-08-11 DIAGNOSIS — O09.892 SHORT INTERVAL BETWEEN PREGNANCIES AFFECTING PREGNANCY IN SECOND TRIMESTER, ANTEPARTUM: Primary | ICD-10-CM

## 2022-08-11 DIAGNOSIS — Z3A.21 21 WEEKS GESTATION OF PREGNANCY: ICD-10-CM

## 2022-08-11 PROBLEM — Z00.00 WELL ADULT EXAM: Status: RESOLVED | Noted: 2021-09-03 | Resolved: 2022-08-11

## 2022-08-11 PROCEDURE — PNV: Performed by: OBSTETRICS & GYNECOLOGY

## 2022-08-11 NOTE — PROGRESS NOTES
36 y o    female at 22 8 wga EGA for PNV  BP : 102/64   TWlbs  Reports difficulty sleeping bc of restlessness - will start an iron supplement, can use melatonin, benadryl and unisom  Had level 2 - growth at 32 weeks  Reviewed PTL precautions  F/u 4 weeks

## 2022-09-08 ENCOUNTER — ROUTINE PRENATAL (OUTPATIENT)
Dept: OBGYN CLINIC | Facility: CLINIC | Age: 32
End: 2022-09-08

## 2022-09-08 ENCOUNTER — APPOINTMENT (OUTPATIENT)
Dept: LAB | Facility: AMBULARY SURGERY CENTER | Age: 32
End: 2022-09-08
Attending: OBSTETRICS & GYNECOLOGY
Payer: COMMERCIAL

## 2022-09-08 VITALS — WEIGHT: 139 LBS | DIASTOLIC BLOOD PRESSURE: 60 MMHG | SYSTOLIC BLOOD PRESSURE: 110 MMHG | BODY MASS INDEX: 23.13 KG/M2

## 2022-09-08 DIAGNOSIS — Z3A.25 25 WEEKS GESTATION OF PREGNANCY: Primary | ICD-10-CM

## 2022-09-08 DIAGNOSIS — Z3A.25 25 WEEKS GESTATION OF PREGNANCY: ICD-10-CM

## 2022-09-08 DIAGNOSIS — O09.892 SHORT INTERVAL BETWEEN PREGNANCIES AFFECTING PREGNANCY IN SECOND TRIMESTER, ANTEPARTUM: ICD-10-CM

## 2022-09-08 LAB
ERYTHROCYTE [DISTWIDTH] IN BLOOD BY AUTOMATED COUNT: 13 % (ref 11.6–15.1)
HCT VFR BLD AUTO: 34 % (ref 34.8–46.1)
HGB BLD-MCNC: 10.7 G/DL (ref 11.5–15.4)
MCH RBC QN AUTO: 28.9 PG (ref 26.8–34.3)
MCHC RBC AUTO-ENTMCNC: 31.5 G/DL (ref 31.4–37.4)
MCV RBC AUTO: 92 FL (ref 82–98)
PLATELET # BLD AUTO: 405 THOUSANDS/UL (ref 149–390)
PMV BLD AUTO: 10.7 FL (ref 8.9–12.7)
RBC # BLD AUTO: 3.7 MILLION/UL (ref 3.81–5.12)
RPR SER QL: NORMAL
WBC # BLD AUTO: 10.04 THOUSAND/UL (ref 4.31–10.16)

## 2022-09-08 PROCEDURE — 36415 COLL VENOUS BLD VENIPUNCTURE: CPT

## 2022-09-08 PROCEDURE — 86592 SYPHILIS TEST NON-TREP QUAL: CPT

## 2022-09-08 PROCEDURE — PNV: Performed by: OBSTETRICS & GYNECOLOGY

## 2022-09-08 PROCEDURE — 85027 COMPLETE CBC AUTOMATED: CPT

## 2022-09-08 NOTE — PROGRESS NOTES
This is a 36 y o   at 25w6d who presents for return OB visit  Denies contractions, leakage, bleeding  Endorses fetal movement   Having episodes of low BP and dizziness when she lies down at night  Occasionally SOB - feels like she can't catch her breath  Denies palpitations, chest pain  Was recommended to take PO Fe but cannot tolerate  BP: 110/60 TWlb  CV: RRR  PULM: CTAB no w/r/r  Reassured normal exam, discussed concerning symptoms  Reviewed normal hypotension of pregnancy, rec hydration      Gave 28 wk labs - will follow up for possible IV Fe infusions  F/up 2 wks

## 2022-09-27 ENCOUNTER — ROUTINE PRENATAL (OUTPATIENT)
Dept: OBGYN CLINIC | Facility: CLINIC | Age: 32
End: 2022-09-27
Payer: COMMERCIAL

## 2022-09-27 ENCOUNTER — APPOINTMENT (OUTPATIENT)
Dept: LAB | Facility: AMBULARY SURGERY CENTER | Age: 32
End: 2022-09-27
Attending: OBSTETRICS & GYNECOLOGY
Payer: COMMERCIAL

## 2022-09-27 VITALS — DIASTOLIC BLOOD PRESSURE: 58 MMHG | WEIGHT: 142.6 LBS | SYSTOLIC BLOOD PRESSURE: 100 MMHG | BODY MASS INDEX: 23.73 KG/M2

## 2022-09-27 DIAGNOSIS — Z23 NEED FOR INFLUENZA VACCINATION: ICD-10-CM

## 2022-09-27 DIAGNOSIS — Z23 NEED FOR TDAP VACCINATION: Primary | ICD-10-CM

## 2022-09-27 DIAGNOSIS — Z3A.28 28 WEEKS GESTATION OF PREGNANCY: ICD-10-CM

## 2022-09-27 DIAGNOSIS — Z3A.25 25 WEEKS GESTATION OF PREGNANCY: ICD-10-CM

## 2022-09-27 DIAGNOSIS — O09.892 SHORT INTERVAL BETWEEN PREGNANCIES AFFECTING PREGNANCY IN SECOND TRIMESTER, ANTEPARTUM: ICD-10-CM

## 2022-09-27 DIAGNOSIS — O09.893 SHORT INTERVAL BETWEEN PREGNANCIES AFFECTING PREGNANCY IN THIRD TRIMESTER, ANTEPARTUM: ICD-10-CM

## 2022-09-27 LAB — GLUCOSE 1H P 50 G GLC PO SERPL-MCNC: 115 MG/DL (ref 40–134)

## 2022-09-27 PROCEDURE — 90686 IIV4 VACC NO PRSV 0.5 ML IM: CPT | Performed by: OBSTETRICS & GYNECOLOGY

## 2022-09-27 PROCEDURE — 90471 IMMUNIZATION ADMIN: CPT | Performed by: OBSTETRICS & GYNECOLOGY

## 2022-09-27 PROCEDURE — PNV: Performed by: OBSTETRICS & GYNECOLOGY

## 2022-09-27 PROCEDURE — 90472 IMMUNIZATION ADMIN EACH ADD: CPT | Performed by: OBSTETRICS & GYNECOLOGY

## 2022-09-27 PROCEDURE — 90715 TDAP VACCINE 7 YRS/> IM: CPT | Performed by: OBSTETRICS & GYNECOLOGY

## 2022-09-27 PROCEDURE — 82950 GLUCOSE TEST: CPT

## 2022-09-27 PROCEDURE — 36415 COLL VENOUS BLD VENIPUNCTURE: CPT

## 2022-09-27 RX ORDER — AZELAIC ACID 0.15 G/G
GEL TOPICAL
COMMUNITY
Start: 2022-09-24

## 2022-09-27 NOTE — PATIENT INSTRUCTIONS
Please refer to Erna Phillips Pregnancy Essentials Guide  [facility])  to access our pregnancy essentials reference guide  Here you will find a lot of information for all trimesters of pregnancy as well as postpartum  You will be able to access information about medications that are safe to use during pregnancy, warning signs in third trimester, what to pack for your hospital stay and many other useful guides  There is also information on class available through our [address] and Pregnancy Classes  [facility] ([facility])  Please do not hesitate to contact the office through [address] or by calling for [phone] with any questions or concerns  We look forward to seeing you at your next scheduled visit!

## 2022-09-27 NOTE — PROGRESS NOTES
29 y o  Alease Room female at 33w3d (Estimated Date of Delivery: 22) for PNV  Pre-Arie Vitals    Flowsheet Row Most Recent Value   Prenatal Assessment    Fetal Heart Rate 150   Fundal Height (cm) 28 cm   Movement Present   Prenatal Vitals    Blood Pressure 100/58   Weight - Scale 64 7 kg (142 lb 9 6 oz)   Urine Albumin/Glucose    Dilation/Effacement/Station    Vaginal Drainage    Edema          kg (15 lb 9 6 oz)  28 wk labs reviewed - will do 1 hr today  Red folder given and reviewed  Discussed Fetal kick counts, PTL/Labor information, Baby & Me Classes  Consent signed  [id number] with transfusion, full code  Discussed current visitor policy/masking  Patient plans to breastfeed  Skin to skin, rooming in, delayed cord clamp,  pain management in labor discussed  TDAP given  Flu given  Rhogam not indicated  Patient denies contractions, bleeding or leakage of fluid  Good fetal movement

## 2022-09-27 NOTE — PROGRESS NOTES
Patient states she is feeling well, no complaints  States she does not need a breast pump  Will get Flu and Tdap today       Urine neg/neg

## 2022-10-12 ENCOUNTER — ROUTINE PRENATAL (OUTPATIENT)
Dept: OBGYN CLINIC | Facility: CLINIC | Age: 32
End: 2022-10-12

## 2022-10-12 VITALS — SYSTOLIC BLOOD PRESSURE: 100 MMHG | BODY MASS INDEX: 23.83 KG/M2 | WEIGHT: 143.2 LBS | DIASTOLIC BLOOD PRESSURE: 58 MMHG

## 2022-10-12 DIAGNOSIS — O09.893 SHORT INTERVAL BETWEEN PREGNANCIES AFFECTING PREGNANCY IN THIRD TRIMESTER, ANTEPARTUM: Primary | ICD-10-CM

## 2022-10-12 PROCEDURE — PNV: Performed by: OBSTETRICS & GYNECOLOGY

## 2022-10-12 NOTE — PROGRESS NOTES
29 y o   female at 30w7d (Estimated Date of Delivery: 22) for PNV  Pre- Vitals    Flowsheet Row Most Recent Value   Prenatal Assessment    Movement Present   Prenatal Vitals    Blood Pressure 100/58   Weight - Scale 65 kg (143 lb 3 2 oz)   Urine Albumin/Glucose    Dilation/Effacement/Station    Vaginal Drainage    Edema          kg (16 lb 3 2 oz)  Doing well 3rd trimester  Growth u/s next week  Had tdap and flu vaccine  Recommend covid booster

## 2022-10-18 NOTE — PATIENT INSTRUCTIONS
Thank you for choosing us for your  care today  If you have any questions about your ultrasound or care, please do not hesitate to contact us or your primary obstetrician  Some general instructions for your pregnancy are:    Protect against coronavirus: get vaccinated - pregnant women are increased risk of severe COVID  Notify your primary care doctor if you have any symptoms  Exercise: Aim for 22 minutes per day (150 minutes per week) of regular exercise  Walking is great! Nutrition: aim for calcium-rich and iron-rich foods as well as healthy sources of protein  Learn about Preeclampsia: preeclampsia is a common, serious high blood pressure complication in pregnancy  A blood pressure of 289MOWD (systolic or top number) or 38QFIT (diastolic or bottom number) is not normal and needs evaluation by your doctor  Aspirin is sometimes prescribed in early pregnancy to prevent preeclampsia in women with risk factors - ask your obstetrician if you should be on this medication  If you smoke, try to reduce how many cigarettes you smoke or try to quit completely  Do not vape  Other warning signs to watch out for in pregnancy or postpartum: chest pain, obstructed breathing or shortness of breath, seizures, thoughts of hurting yourself or your baby, bleeding, a painful or swollen leg, fever, or headache (see [employer] POST-BIRTH Warning Signs campaign)  If these happen call 911  Itching is also not normal in pregnancy and if you experience this, especially over your hands and feet, potentially worse at night, notify your doctors

## 2022-10-20 ENCOUNTER — ULTRASOUND (OUTPATIENT)
Dept: PERINATAL CARE | Facility: OTHER | Age: 32
End: 2022-10-20
Payer: COMMERCIAL

## 2022-10-20 VITALS
BODY MASS INDEX: 23.88 KG/M2 | HEART RATE: 83 BPM | HEIGHT: 65 IN | WEIGHT: 143.3 LBS | SYSTOLIC BLOOD PRESSURE: 96 MMHG | DIASTOLIC BLOOD PRESSURE: 52 MMHG

## 2022-10-20 DIAGNOSIS — Z36.89 ENCOUNTER FOR ULTRASOUND TO CHECK FETAL GROWTH: ICD-10-CM

## 2022-10-20 DIAGNOSIS — Z3A.31 31 WEEKS GESTATION OF PREGNANCY: ICD-10-CM

## 2022-10-20 DIAGNOSIS — O09.893 SHORT INTERVAL BETWEEN PREGNANCIES AFFECTING PREGNANCY IN THIRD TRIMESTER, ANTEPARTUM: Primary | ICD-10-CM

## 2022-10-20 DIAGNOSIS — Z36.2 ENCOUNTER FOR FOLLOW-UP ULTRASOUND OF FETAL ANATOMY: ICD-10-CM

## 2022-10-20 PROCEDURE — 76816 OB US FOLLOW-UP PER FETUS: CPT | Performed by: OBSTETRICS & GYNECOLOGY

## 2022-10-20 PROCEDURE — 99212 OFFICE O/P EST SF 10 MIN: CPT | Performed by: OBSTETRICS & GYNECOLOGY

## 2022-10-20 NOTE — PROGRESS NOTES
[address]: Ms Mamie Gregory was seen today for fetal growth assessment ultrasound  See ultrasound report under "OB Procedures" tab  The time spent on this established patient on the encounter date included 4 minutes previsit service time reviewing records and precharting, 4 minutes face-to-face service time counseling regarding results and coordinating care, and  3 minutes charting, totalling 11 minutes    Please don't hesitate to contact our office with any concerns or questions   -Kylah Segovia

## 2022-10-26 ENCOUNTER — ROUTINE PRENATAL (OUTPATIENT)
Dept: OBGYN CLINIC | Facility: CLINIC | Age: 32
End: 2022-10-26

## 2022-10-26 VITALS — WEIGHT: 145 LBS | SYSTOLIC BLOOD PRESSURE: 98 MMHG | BODY MASS INDEX: 24.13 KG/M2 | DIASTOLIC BLOOD PRESSURE: 56 MMHG

## 2022-10-26 DIAGNOSIS — Z34.83 SUPERVISION OF NORMAL INTRAUTERINE PREGNANCY IN MULTIGRAVIDA, THIRD TRIMESTER: Primary | ICD-10-CM

## 2022-10-26 DIAGNOSIS — Z3A.32 32 WEEKS GESTATION OF PREGNANCY: ICD-10-CM

## 2022-10-26 PROCEDURE — PNV: Performed by: PHYSICIAN ASSISTANT

## 2022-10-26 NOTE — PROGRESS NOTES
Patient is a 28 YO  female presenting to the office at 32 5 weeks for routine OB care     BP: 98/56  TWlb  Fetal Movement: yes good movement  Feeling well today  Denies LOF, CTX, VB  Had f/u with MFM at 31 weeks  Received flu shot and tdap  Reviewed precautions  Call for concerns  RTO 2 weeks

## 2022-10-28 ENCOUNTER — OFFICE VISIT (OUTPATIENT)
Dept: FAMILY MEDICINE CLINIC | Facility: CLINIC | Age: 32
End: 2022-10-28

## 2022-10-28 VITALS
DIASTOLIC BLOOD PRESSURE: 70 MMHG | RESPIRATION RATE: 16 BRPM | TEMPERATURE: 98.2 F | SYSTOLIC BLOOD PRESSURE: 110 MMHG | WEIGHT: 146.8 LBS | HEART RATE: 81 BPM | BODY MASS INDEX: 24.46 KG/M2 | OXYGEN SATURATION: 98 % | HEIGHT: 65 IN

## 2022-10-28 DIAGNOSIS — Z3A.33 33 WEEKS GESTATION OF PREGNANCY: ICD-10-CM

## 2022-10-28 DIAGNOSIS — F41.8 ANXIETY WITH DEPRESSION: Primary | ICD-10-CM

## 2022-10-28 RX ORDER — ESCITALOPRAM OXALATE 20 MG/1
20 TABLET ORAL DAILY
Qty: 90 TABLET | Refills: 1 | Status: SHIPPED | OUTPATIENT
Start: 2022-10-28

## 2022-10-28 NOTE — PROGRESS NOTES
FAMILY PRACTICE OFFICE VISIT       NAME: Eleni Gonzales  AGE: 29 y o  SEX: female       : 1990        MRN: [id number]        Assessment and Plan     1  Anxiety with depression  Assessment & Plan:  Patient presents for evaluation of intermittent anxiety and depression  EGA 33 weeks, TAVO 22  Pregnancy has been going well  Overall she reports improvement of symptoms after start of Lexapro 10 mg qd  Previous trial of Zoloft was unsuccessful  I discussed Rx management  with patient's OBGYN, Dr Fina Atkins  She has no objection to increasing dose of Lexapro now  Patient will change dose of Lexapro to 20 mg daily  Advised to continue close follow-up with OBGYN and primary care physician  Orders:  -     escitalopram (LEXAPRO) 20 mg tablet; Take 1 tablet (20 mg total) by mouth daily    2  33 weeks gestation of pregnancy             There are no Patient Instructions on file for this visit  No follow-ups on file  Discussed with the patient and all questioned fully answered  She will call me if any problems arise  M*Modal software was used to dictate this note  It may contain errors with dictating incorrect words/spelling  Please contact provider directly with any questions  Chief Complaint     Chief Complaint   Patient presents with   • Medication Management       History of Present Illness     Patient presents for evaluation of anxiety and depression  She is under care of [facility]  Patient is expecting her second child, TAVO 22  Pregancy is going well pain  Emotionally, she has been struggling, reports emotional lability, patient was hoping to increase the dose of Lexapro after delivery  H/o seasonal affective disorder  No migraines now   Patient you Zoloft during 1st pregnancy, she was on 200 mg daily with symptoms not quite well controlled  Patient reports overall that Lexapro has been working better for her             Review of Systems   Review of Systems   Constitutional: Negative  Respiratory: Negative  Cardiovascular: Negative  Psychiatric/Behavioral: Positive for dysphoric mood  Negative for self-injury and suicidal ideas  The patient is nervous/anxious  Active Problem List     Patient Active Problem List   Diagnosis   • Chronic migraine without aura, not intractable   • Depression with anxiety   • Vitamin D deficiency   • Myalgia   • Anxiety with depression   • 33 weeks gestation of pregnancy   • Short interval between pregnancies affecting pregnancy in third trimester, antepartum       Past Medical History:  Past Medical History:   Diagnosis Date   • Abnormal Pap smear of cervix    • Anal fissure    • Anxiety    • Constipation    • Depression    • Headache     migranes   • IBS (irritable bowel syndrome)    • Migraine    • Psoriasis    • Varicella        Past Surgical History:  Past Surgical History:   Procedure Laterality Date   • COLPOSCOPY     • GYNECOLOGIC CRYOSURGERY     • [place] COLONOSCOPY FLX DX W/COLLJ SPEC WHEN PFRMD N/A 8/24/2016    Procedure: EGD AND COLONOSCOPY;  Surgeon: Jennyfer Duggan MD;  Location:  GI LAB; Service: Gastroenterology   • WISDOM TOOTH EXTRACTION      all four       Family History:  Family History   Problem Relation Age of Onset   • Anxiety disorder Mother    • Uterine cancer Mother 46   • No Known Problems Father    • No Known Problems Brother    • Hypertension Maternal Grandmother    • Mental illness Maternal Grandmother    • Scleroderma Paternal Grandmother    • Cancer Paternal Grandfather         multiple mylenoma    • Mental illness Paternal Grandfather    • Sjogren's syndrome Paternal Grandfather        Social History:  Social History     Socioeconomic History   • Marital status: /Civil Union     Spouse name: Arie Dadds   • Number of children: Not on file   • Years of education: Not on file   • Highest education level:  Bachelor's degree (e g , BA, AB, BS)   Occupational History   • Occupation: RN Tobacco Use   • Smoking status: Never Smoker   • Smokeless tobacco: Never Used   Vaping Use   • Vaping Use: Never used   Substance and Sexual Activity   • Alcohol use: Not Currently     Comment: socially, not while pregnant   • Drug use: Never   • Sexual activity: Yes     Partners: Male     Birth control/protection: None   Other Topics Concern   • Not on file   Social History Narrative    Always uses seatbelts    Caffeine use     Social Determinants of Health     Financial Resource Strain: Not on file   Food Insecurity: Not on file   Transportation Needs: Not on file   Physical Activity: Not on file   Stress: Not on file   Social Connections: Not on file   Intimate Partner Violence: Not on file   Housing Stability: Not on file         Objective     Vitals:    10/28/22 1403   BP: 110/70   BP Location: Left arm   Patient Position: Sitting   Cuff Size: Standard   Pulse: 81   Resp: 16   Temp: 98 2 °F (36 8 °C)   TempSrc: Temporal   SpO2: 98%   Weight: 66 6 kg (146 lb 12 8 oz)   Height: 5' 5" (1 651 m)       Wt Readings from Last 3 Encounters:   10/28/22 66 6 kg (146 lb 12 8 oz)   10/26/22 65 8 kg (145 lb)   10/20/22 65 kg (143 lb 4 8 oz)       Physical Exam  Vitals and nursing note reviewed  Constitutional:       General: She is not in acute distress  Appearance: Normal appearance  She is not ill-appearing  HENT:      Head: Normocephalic and atraumatic  Pulmonary:      Effort: Pulmonary effort is normal       Breath sounds: Normal breath sounds  Neurological:      General: No focal deficit present  Mental Status: She is alert and oriented to person, place, and time  Psychiatric:         Mood and Affect: Mood and affect normal          Speech: Speech normal          Behavior: Behavior normal  Behavior is cooperative  Thought Content: Thought content normal       Comments: Good eye contact,very pleasant            Pertinent Laboratory/Diagnostic Studies:    Lab Results   Component Value Date WBC 10 04 2022    HGB 10 7 (L) 2022    HCT 34 0 (L) 2022    MCV 92 2022     (H) 2022       No results found for: TSH    No results found for: CHOL  Lab Results   Component Value Date    TRIG 124 2018     Lab Results   Component Value Date    HDL 46 2018     Lab Results   Component Value Date    LDLCALC 65 2018     Lab Results   Component Value Date    HGBA1C 4 6 2018     Lab Results   Component Value Date    SODIUM 136 2021    K 4 0 2021     2021    CO2 28 2021    AGAP 3 (L) 2021    BUN 10 2021    CREATININE 0 65 2021    GLUC 78 2019    GLUF 81 2021    CALCIUM 9 2 2021    AST 16 2021    ALT 20 2021    ALKPHOS 108 2021    TP 8 0 2021    TBILI 0 37 2021    EGFR 120 2021       No orders of the defined types were placed in this encounter  ALLERGIES:  No Known Allergies    Current Medications     Current Outpatient Medications   Medication Sig Dispense Refill   • Azelaic Acid 15 % cream      • Butalbital-APAP-Caffeine (Fioricet) -40 MG CAPS Take 1 tablet every 4 to 6 hours as needed for migraine headache 30 capsule 2   • Docusate Sodium (COLACE PO) Take by mouth     • escitalopram (LEXAPRO) 20 mg tablet Take 1 tablet (20 mg total) by mouth daily 90 tablet 1   • Prenatal Multivit-Min-Fe-FA (PRE-HAMIDA FORMULA) TABS Take by mouth daily     • ergocalciferol (ERGOCALCIFEROL) 1 25 MG (34120 UT) capsule  (Patient not taking: No sig reported)       No current facility-administered medications for this visit         Medications Discontinued During This Encounter   Medication Reason   • escitalopram (LEXAPRO) 10 mg tablet        Health Maintenance     Health Maintenance   Topic Date Due   • Annual Physical  2022   • BMI: Adult  10/28/2023   • Cervical Cancer Screening  06/15/2027   • DTaP,Tdap,and Td Vaccines (9 - Td or Tdap) 2032   • HIV Screening Completed   • Hepatitis C Screening  Completed   • HIB Vaccine  Completed   • Hepatitis B Vaccine  Completed   • IPV Vaccine  Completed   • Influenza Vaccine  Completed   • COVID-19 Vaccine  Completed   • Pneumococcal Vaccine: Pediatrics (0 to 5 Years) and At-Risk Patients (6 to 59 Years)  Aged Out   • Hepatitis A Vaccine  Aged Out   • Meningococcal ACWY Vaccine  Aged Out   • HPV Vaccine  Aged [employer] History   Administered Date(s) Administered   • COVID-19 PFIZER VACCINE 0 3 ML IM 04/06/2021, 04/27/2021, 11/18/2021   • DTaP 1990, 01/11/1993, 03/11/1993, 03/28/1994, 03/26/1998   • Hep A, adult 08/03/2019, 02/03/2020   • Hep B, adult 1990, 1990, 06/14/1993   • HiB 1990, 01/11/1993, 03/11/1993, 12/17/1993   • INFLUENZA 10/16/2011, 10/05/2018, 10/02/2019, 11/18/2021   • Influenza, injectable, quadrivalent, preservative free 0 5 mL 10/08/2020, 09/27/2022   • Influenza, seasonal, injectable 10/16/2011   • MMR 12/17/1993, 03/26/1998   • Meningococcal MCV4P 01/13/2020   • OPV 1990, 01/11/1993, 03/28/1994, 03/26/1998   • Tdap 01/01/2015, 01/04/2021, 09/27/2022   • Typhoid, Unspecified 01/22/2020   • Varicella 03/01/1994   • Yellow Fever 12/27/2019       Vicki Chao

## 2022-10-30 PROBLEM — F41.8 ANXIETY WITH DEPRESSION: Status: ACTIVE | Noted: 2022-03-03

## 2022-10-30 PROBLEM — Z3A.33 33 WEEKS GESTATION OF PREGNANCY: Status: ACTIVE | Noted: 2022-06-12

## 2022-10-30 NOTE — ASSESSMENT & PLAN NOTE
Patient presents for evaluation of intermittent anxiety and depression  EGA 33 weeks, TAVO 12/16/22  Pregnancy has been going well  Overall she reports improvement of symptoms after start of Lexapro 10 mg qd  Previous trial of Zoloft was unsuccessful  I discussed Rx management  with patient's OBGYN, Dr Angel Luis Paul  She has no objection to increasing dose of Lexapro now  Patient will change dose of Lexapro to 20 mg daily  Advised to continue close follow-up with OBGYN and primary care physician

## 2022-11-10 ENCOUNTER — ROUTINE PRENATAL (OUTPATIENT)
Dept: OBGYN CLINIC | Facility: CLINIC | Age: 32
End: 2022-11-10

## 2022-11-10 VITALS — DIASTOLIC BLOOD PRESSURE: 52 MMHG | BODY MASS INDEX: 24.3 KG/M2 | SYSTOLIC BLOOD PRESSURE: 94 MMHG | WEIGHT: 146 LBS

## 2022-11-10 DIAGNOSIS — Z3A.34 34 WEEKS GESTATION OF PREGNANCY: ICD-10-CM

## 2022-11-10 DIAGNOSIS — O09.893 SHORT INTERVAL BETWEEN PREGNANCIES AFFECTING PREGNANCY IN THIRD TRIMESTER, ANTEPARTUM: Primary | ICD-10-CM

## 2022-11-10 NOTE — PROGRESS NOTES
This is a 29 y o   at 34w6d who presents for return OB visit  No complaints  Denies contractions, leakage, bleeding  Endorses fetal movement   BP: 94/52 TWlb    Plans to breastfeed: yes  Plans for contraception: declines while breastfeeding  Discussed limitations of DESAI  Rec condoms for double coverage     F/up 2 wks

## 2022-11-23 ENCOUNTER — TELEPHONE (OUTPATIENT)
Dept: OBGYN CLINIC | Facility: CLINIC | Age: 32
End: 2022-11-23

## 2022-11-23 ENCOUNTER — ROUTINE PRENATAL (OUTPATIENT)
Dept: OBGYN CLINIC | Facility: CLINIC | Age: 32
End: 2022-11-23

## 2022-11-23 VITALS — BODY MASS INDEX: 24.96 KG/M2 | SYSTOLIC BLOOD PRESSURE: 98 MMHG | DIASTOLIC BLOOD PRESSURE: 58 MMHG | WEIGHT: 150 LBS

## 2022-11-23 DIAGNOSIS — Z3A.36 36 WEEKS GESTATION OF PREGNANCY: ICD-10-CM

## 2022-11-23 DIAGNOSIS — O09.893 SHORT INTERVAL BETWEEN PREGNANCIES AFFECTING PREGNANCY IN THIRD TRIMESTER, ANTEPARTUM: Primary | ICD-10-CM

## 2022-11-23 NOTE — TELEPHONE ENCOUNTER
Spoke to patient on the phone, notified patient that ECV scheduled for 11/29 PM at 3:08 with Dr Templeton Primes

## 2022-11-23 NOTE — PROGRESS NOTES
This is a 29 y o   at 36w5d who presents for return OB visit  No complaints  Denies contractions, leakage, bleeding  Endorses fetal movement  BP: 98/58 TWlb    GBS, GC/CT done: Yes  PCN allergy: No  SVE: /-3  Found to be in the breech presentation confirmed by [address]  Discussed ECV vs expectant management with plan for 1LTCS at 39 wks  Pt desires ECV  Reviewed risks including failure, PROM, labor, placental abruption, fetal intolerance, need for emergent delivery  All questions answered   Scheduled for  at 1pm with Dr Anna Simpson    F/up 1 wk

## 2022-11-24 LAB
C TRACH DNA SPEC QL NAA+PROBE: NEGATIVE
N GONORRHOEA DNA SPEC QL NAA+PROBE: NEGATIVE

## 2022-11-25 LAB — GP B STREP DNA SPEC QL NAA+PROBE: NEGATIVE

## 2022-11-28 ENCOUNTER — ANESTHESIA EVENT (INPATIENT)
Dept: ANESTHESIOLOGY | Facility: HOSPITAL | Age: 32
End: 2022-11-28

## 2022-11-28 ENCOUNTER — HOSPITAL ENCOUNTER (INPATIENT)
Facility: HOSPITAL | Age: 32
LOS: 1 days | Discharge: HOME | End: 2022-11-29
Attending: OBSTETRICS & GYNECOLOGY | Admitting: OBSTETRICS & GYNECOLOGY

## 2022-11-28 ENCOUNTER — NURSE TRIAGE (OUTPATIENT)
Dept: OTHER | Facility: OTHER | Age: 32
End: 2022-11-28

## 2022-11-28 ENCOUNTER — ANESTHESIA (INPATIENT)
Dept: ANESTHESIOLOGY | Facility: HOSPITAL | Age: 32
End: 2022-11-28

## 2022-11-28 DIAGNOSIS — Z3A.36 36 WEEKS GESTATION OF PREGNANCY: Primary | ICD-10-CM

## 2022-11-28 PROBLEM — Z3A.37 37 WEEKS GESTATION OF PREGNANCY: Status: ACTIVE | Noted: 2022-06-12

## 2022-11-28 LAB
ABO GROUP BLD: NORMAL
BASE EXCESS BLDCOA CALC-SCNC: -3.1 MMOL/L (ref 3–11)
BASE EXCESS BLDCOV CALC-SCNC: -2.8 MMOL/L (ref 1–9)
BASOPHILS # BLD AUTO: 0.03 THOUSANDS/ÂΜL (ref 0–0.1)
BASOPHILS NFR BLD AUTO: 0 % (ref 0–1)
BLD GP AB SCN SERPL QL: NEGATIVE
EOSINOPHIL # BLD AUTO: 0.1 THOUSAND/ÂΜL (ref 0–0.61)
EOSINOPHIL NFR BLD AUTO: 1 % (ref 0–6)
ERYTHROCYTE [DISTWIDTH] IN BLOOD BY AUTOMATED COUNT: 15.3 % (ref 11.6–15.1)
HCO3 BLDCOA-SCNC: 25.4 MMOL/L (ref 17.3–27.3)
HCO3 BLDCOV-SCNC: 23.6 MMOL/L (ref 12.2–28.6)
HCT VFR BLD AUTO: 34.1 % (ref 34.8–46.1)
HGB BLD-MCNC: 10.2 G/DL (ref 11.5–15.4)
IMM GRANULOCYTES # BLD AUTO: 0.07 THOUSAND/UL (ref 0–0.2)
IMM GRANULOCYTES NFR BLD AUTO: 1 % (ref 0–2)
LYMPHOCYTES # BLD AUTO: 1.86 THOUSANDS/ÂΜL (ref 0.6–4.47)
LYMPHOCYTES NFR BLD AUTO: 16 % (ref 14–44)
MCH RBC QN AUTO: 24.1 PG (ref 26.8–34.3)
MCHC RBC AUTO-ENTMCNC: 29.9 G/DL (ref 31.4–37.4)
MCV RBC AUTO: 81 FL (ref 82–98)
MONOCYTES # BLD AUTO: 1.14 THOUSAND/ÂΜL (ref 0.17–1.22)
MONOCYTES NFR BLD AUTO: 10 % (ref 4–12)
NEUTROPHILS # BLD AUTO: 8.2 THOUSANDS/ÂΜL (ref 1.85–7.62)
NEUTS SEG NFR BLD AUTO: 72 % (ref 43–75)
NRBC BLD AUTO-RTO: 0 /100 WBCS
O2 CT VFR BLDCOA CALC: 6.7 ML/DL
OXYHGB MFR BLDCOA: 32 %
OXYHGB MFR BLDCOV: 42 %
PCO2 BLDCOA: 60.2 MM[HG] (ref 30–60)
PCO2 BLDCOV: 47 MM HG (ref 27–43)
PH BLDCOA: 7.24 [PH] (ref 7.23–7.43)
PH BLDCOV: 7.32 [PH] (ref 7.19–7.49)
PLATELET # BLD AUTO: 420 THOUSANDS/UL (ref 149–390)
PMV BLD AUTO: 10.8 FL (ref 8.9–12.7)
PO2 BLDCOA: 16.1 MM HG (ref 5–25)
PO2 BLDCOV: 18.2 MM HG (ref 15–45)
RBC # BLD AUTO: 4.23 MILLION/UL (ref 3.81–5.12)
RH BLD: POSITIVE
RPR SER QL: NORMAL
SAO2 % BLDCOV: 8.6 ML/DL
SPECIMEN EXPIRATION DATE: NORMAL
WBC # BLD AUTO: 11.4 THOUSAND/UL (ref 4.31–10.16)

## 2022-11-28 RX ORDER — DIAPER,BRIEF,INFANT-TODD,DISP
1 EACH MISCELLANEOUS DAILY PRN
Status: DISCONTINUED | OUTPATIENT
Start: 2022-11-28 | End: 2022-11-29 | Stop reason: HOSPADM

## 2022-11-28 RX ORDER — SODIUM CHLORIDE, SODIUM LACTATE, POTASSIUM CHLORIDE, CALCIUM CHLORIDE 600; 310; 30; 20 MG/100ML; MG/100ML; MG/100ML; MG/100ML
125 INJECTION, SOLUTION INTRAVENOUS CONTINUOUS
Status: DISCONTINUED | OUTPATIENT
Start: 2022-11-28 | End: 2022-11-29

## 2022-11-28 RX ORDER — ONDANSETRON 2 MG/ML
4 INJECTION INTRAMUSCULAR; INTRAVENOUS EVERY 6 HOURS PRN
Status: DISCONTINUED | OUTPATIENT
Start: 2022-11-28 | End: 2022-11-29

## 2022-11-28 RX ORDER — ONDANSETRON 2 MG/ML
4 INJECTION INTRAMUSCULAR; INTRAVENOUS EVERY 8 HOURS PRN
Status: DISCONTINUED | OUTPATIENT
Start: 2022-11-28 | End: 2022-11-28

## 2022-11-28 RX ORDER — DIPHENHYDRAMINE HYDROCHLORIDE 50 MG/ML
25 INJECTION INTRAMUSCULAR; INTRAVENOUS EVERY 6 HOURS PRN
Status: DISCONTINUED | OUTPATIENT
Start: 2022-11-28 | End: 2022-11-29 | Stop reason: HOSPADM

## 2022-11-28 RX ORDER — ACETAMINOPHEN 325 MG/1
650 TABLET ORAL EVERY 4 HOURS PRN
Status: DISCONTINUED | OUTPATIENT
Start: 2022-11-28 | End: 2022-11-29 | Stop reason: HOSPADM

## 2022-11-28 RX ORDER — CALCIUM CARBONATE 200(500)MG
1000 TABLET,CHEWABLE ORAL 3 TIMES DAILY PRN
Status: DISCONTINUED | OUTPATIENT
Start: 2022-11-28 | End: 2022-11-29 | Stop reason: HOSPADM

## 2022-11-28 RX ORDER — IBUPROFEN 600 MG/1
600 TABLET ORAL EVERY 6 HOURS PRN
Status: DISCONTINUED | OUTPATIENT
Start: 2022-11-28 | End: 2022-11-29 | Stop reason: HOSPADM

## 2022-11-28 RX ORDER — OXYTOCIN/RINGER'S LACTATE 30/500 ML
250 PLASTIC BAG, INJECTION (ML) INTRAVENOUS ONCE
Status: COMPLETED | OUTPATIENT
Start: 2022-11-28 | End: 2022-11-28

## 2022-11-28 RX ORDER — DOCUSATE SODIUM 100 MG/1
100 CAPSULE, LIQUID FILLED ORAL 2 TIMES DAILY
Status: DISCONTINUED | OUTPATIENT
Start: 2022-11-28 | End: 2022-11-29 | Stop reason: HOSPADM

## 2022-11-28 RX ORDER — OXYTOCIN/RINGER'S LACTATE 30/500 ML
1-30 PLASTIC BAG, INJECTION (ML) INTRAVENOUS
Status: DISCONTINUED | OUTPATIENT
Start: 2022-11-28 | End: 2022-11-28

## 2022-11-28 RX ORDER — LIDOCAINE HYDROCHLORIDE AND EPINEPHRINE 15; 5 MG/ML; UG/ML
INJECTION, SOLUTION EPIDURAL AS NEEDED
Status: DISCONTINUED | OUTPATIENT
Start: 2022-11-28 | End: 2022-11-28 | Stop reason: HOSPADM

## 2022-11-28 RX ORDER — ESCITALOPRAM OXALATE 20 MG/1
20 TABLET ORAL DAILY
Status: DISCONTINUED | OUTPATIENT
Start: 2022-11-28 | End: 2022-11-29 | Stop reason: HOSPADM

## 2022-11-28 RX ADMIN — HYDROCORTISONE 1 APPLICATION: 1 CREAM TOPICAL at 22:50

## 2022-11-28 RX ADMIN — Medication 2 MILLI-UNITS/MIN: at 10:00

## 2022-11-28 RX ADMIN — ESCITALOPRAM OXALATE 20 MG: 20 TABLET ORAL at 21:30

## 2022-11-28 RX ADMIN — Medication 1 APPLICATION: at 22:50

## 2022-11-28 RX ADMIN — ONDANSETRON 4 MG: 2 INJECTION INTRAMUSCULAR; INTRAVENOUS at 09:03

## 2022-11-28 RX ADMIN — Medication: at 08:45

## 2022-11-28 RX ADMIN — SODIUM CHLORIDE, POTASSIUM CHLORIDE, SODIUM LACTATE AND CALCIUM CHLORIDE 125 ML/HR: 600; 310; 30; 20 INJECTION, SOLUTION INTRAVENOUS at 09:53

## 2022-11-28 RX ADMIN — ACETAMINOPHEN 650 MG: 325 TABLET, FILM COATED ORAL at 18:40

## 2022-11-28 RX ADMIN — DOCUSATE SODIUM 100 MG: 100 CAPSULE, LIQUID FILLED ORAL at 18:40

## 2022-11-28 RX ADMIN — Medication: at 15:58

## 2022-11-28 RX ADMIN — LIDOCAINE HYDROCHLORIDE AND EPINEPHRINE 5 ML: 15; 5 INJECTION, SOLUTION EPIDURAL at 08:37

## 2022-11-28 RX ADMIN — SODIUM CHLORIDE, POTASSIUM CHLORIDE, SODIUM LACTATE AND CALCIUM CHLORIDE 999 ML/HR: 600; 310; 30; 20 INJECTION, SOLUTION INTRAVENOUS at 08:30

## 2022-11-28 RX ADMIN — Medication 250 MILLI-UNITS/MIN: at 16:42

## 2022-11-28 RX ADMIN — SODIUM CHLORIDE, POTASSIUM CHLORIDE, SODIUM LACTATE AND CALCIUM CHLORIDE 125 ML/HR: 600; 310; 30; 20 INJECTION, SOLUTION INTRAVENOUS at 14:39

## 2022-11-28 RX ADMIN — SODIUM CHLORIDE, POTASSIUM CHLORIDE, SODIUM LACTATE AND CALCIUM CHLORIDE 125 ML/HR: 600; 310; 30; 20 INJECTION, SOLUTION INTRAVENOUS at 07:16

## 2022-11-28 NOTE — OB LABOR/OXYTOCIN SAFETY PROGRESS
Oxytocin Safety Progress Check Note - Kesha Cruz 29 y o  female MRN: [id number]    Unit/Bed#: -01 Encounter: [id number]    Dose (farnaz-units/min) Oxytocin: 6 farnaz-units/min  Contraction Frequency (minutes): 1 5-3  Contraction Quality: Mild  Tachysystole: No   Cervical Dilation: 4-5        Cervical Effacement: 80  Fetal Station: -1  Baseline Rate: 120 bpm  Fetal Heart Rate: 127 BPM  FHR Category: Category I  Oxytocin Safety Progress Check: Safety check completed            Vital Signs:   Vitals:    11/28/22 1145   BP: 98/55   Pulse:    Resp:    Temp:    SpO2:        Notes/comments:   AROM- clear  C/w current pitocin dose   Comfortable with epidural         Madina Solis MD 11/28/2022 12:17 PM

## 2022-11-28 NOTE — L&D DELIVERY NOTE
DELIVERY NOTE  Treva Rajan 29 y o  female MRN: [id number]  Unit/Bed#: -01 Encounter: [id number]  [facility] Provider:  [facility]     Attending Obstetrician:    Dr Nessa Guido MD    Assisting Resident:   Dr Billie Harrington    Pre-Delivery Diagnosis:   IUP @ 37w3d   Labor   Short interval pregnancy   Hx depression   Hx breech presentation in pregnancy     Post-Delivery Diagnosis:    @ 37w3d   Labor   Short interval pregnancy   Hx depression   Hx breech presentation in pregnancy     Procedure:  Spontaneous vaginal delivery  Repair of left labial laceration     Anesthesia:  epidural    Estimated Blood Loss:   658 cc    Complications:    None    Cord Gases:  Umbilical Cord Venous Blood Gas:  Results from last 7 days   Lab Units 22  1645   PH COV  7 319   PCO2 COV mm HG 47 0*   HCO3 COV mmol/L 23 6   BASE EXC COV mmol/L -2 8*   O2 CT CD VB mL/dL 8 6   O2 HGB, VENOUS CORD % 62 2     Umbilical Cord Arterial Blood Gas:  Results from last 7 days   Lab Units 22  1645   PH COA  7 243   PCO2 COA  60 2*   PO2 COA mm HG 16 1   HCO3 COA mmol/L 25 4   BASE EXC COA mmol/L -3 1*   O2 CONTENT CORD ART ml/dl 6 7   O2 HGB, ARTERIAL CORD % 32 0       Specimens:  Placenta; normal appearing, central insertion, intact  Cord blood obtained  Arterial and venous blood gases (below)      Outcomes:  APGARS: 8 (1 min) and 9 (5 min)  Weight: pending     Description of Labor Course and Delivery:  Treva Rajan is a 29 y o   female at 37w3d who was admitted to L&D on 2022 for spontaneous labor at 3 cm dilation  She was admitted and initially expectantly managed  Pitocin titration was then started for labor augmentation for protracted dilation  She received an epidural for pain management  AROM was performed for further labor augmentation  She was noted to become complete at 1630, and started pushing at 1638  She pushed for 2 min       With maternal pushing efforts, the fetal vertex delivered spontaneously  A nuchal cord was not noted  The anterior  right shoulder was delivered atraumatically with gentle downward traction  The contralateral arm was delivered with gentle upward traction  The remainder of the fetus delivered spontaneously at 1640, resulting in a viable male   Upon delivery, the infant was placed on the mothers abdomen and the cord was doubly clamped and cut after at least 30 seconds  The  was noted to have good tone and cry spontaneously  The  was passed off to  staff for evaluation  Umbilical cord blood and umbilical artery and venous gases were collected and sent to the lab  Active management of the third stage of labor was undertaken with administration of IV pitocin at 250milliunits/min, gentle cord traction, and fundal massage  An intact placenta was delivered spontaneously at 1644  It was noted to have a centrally-inserted 3-vessel cord  It was sent to storage  Inspection of the perineum, vagina, labia, cervix, and urethra revealed a left labial laceration  This was repaired in a running, nonlocked fashion using 3-0 Vicryl rapide  Bleeding was noted to be under control  A bimanual exam was performed and residual clots were expressed from the uterus  The uterus was noted to remain firm  At the conclusion of the delivery, all needle, sponge, and instrument counts were noted to be correct  Patient tolerated the procedure well and was allowed to recover in labor and delivery room with family and  before being transferred to the post-partum floor  Dr Lynda Hoskins MD was present for the entire procedure      Carlos Vincent MD  PGY-4 OB/GYN   2022 5:12 PM

## 2022-11-28 NOTE — H&P
H&P Exam - Obstetrics   Arcadio Vera 29 y o  female MRN: [id number]  Unit/Bed#: LD TRIAGE  Encounter: [id number]      History of Present Illness     Chief Complaint: Active labor    HPI:  Arcadio Vera is a 29 y o   female with an TAVO of 2022, Alternate TAVO Entry at 37w3d weeks gestation who is being admitted for active labor, 3cm dilated  She was previously 1cm dilated in the office and previously breech    Contractions: present, becoming more painful and frequent  Loss of fluid: no  Vaginal bleeding: some spotting  Fetal movement: slight decrease    She is a [facility] patient  PREGNANCY COMPLICATIONS:   1) Anxiety/Depression  2) Short interval pregnancy    OB History    Para Term  AB Living   2 1 1 0 0 1   SAB IAB Ectopic Multiple Live Births   0 0 0 0 1      # Outcome Date GA Lbr Cristino/2nd Weight Sex Delivery Anes PTL Lv   2 Current            1 Term 21 39w3d / 02:23 3550 g (7 lb 13 2 oz) M Vag-Spont EPI N NEHA       Baby complications/comments: Variable lie - previously breech, scheduled for ECV     Review of Systems   Constitutional: Negative for fever  HENT: Negative for rhinorrhea, sinus pressure, sneezing and sore throat  Eyes: Negative for visual disturbance  Respiratory: Negative for cough, shortness of breath and wheezing  Cardiovascular: Negative for chest pain, palpitations and leg swelling  Gastrointestinal: Positive for abdominal pain  Negative for abdominal distention, blood in stool, nausea and vomiting  Genitourinary: Positive for vaginal bleeding  Negative for dysuria, flank pain and vaginal discharge  Musculoskeletal: Negative for neck pain and neck stiffness  Skin: Negative for color change, pallor and rash  Neurological: Negative for light-headedness, numbness and headaches           Historical Information   Past Medical History:   Diagnosis Date   • Abnormal Pap smear of cervix    • Anal fissure    • Anxiety    • Constipation    • Depression • Headache     migranes   • IBS (irritable bowel syndrome)    • Migraine    • Psoriasis    • Varicella      Past Surgical History:   Procedure Laterality Date   • COLPOSCOPY     • GYNECOLOGIC CRYOSURGERY     • [place] COLONOSCOPY FLX DX W/COLLJ SPEC WHEN PFRMD N/A 2016    Procedure: EGD AND COLONOSCOPY;  Surgeon: Scott Knutson MD;  Location:  GI LAB; Service: Gastroenterology   • WISDOM TOOTH EXTRACTION      all four     Social History   Social History     Substance and Sexual Activity   Alcohol Use Not Currently    Comment: socially, not while pregnant     Social History     Substance and Sexual Activity   Drug Use Never     Social History     Tobacco Use   Smoking Status Never   Smokeless Tobacco Never     Family History: non-contributory    Meds/Allergies      Medications Prior to Admission   Medication   • Azelaic Acid 15 % cream   • Butalbital-APAP-Caffeine (Fioricet) -40 MG CAPS   • Docusate Sodium (COLACE PO)   • ergocalciferol (ERGOCALCIFEROL) 1 25 MG (14019 UT) capsule   • escitalopram (LEXAPRO) 20 mg tablet   • Prenatal Multivit-Min-Fe-FA (PRE- FORMULA) TABS      No Known Allergies    OBJECTIVE:    Vitals: Blood pressure 114/65, pulse 102, resp  rate 18, height 5' 5" (1 651 m), weight 68 kg (150 lb), last menstrual period 2022, SpO2 99 %, not currently breastfeeding  Body mass index is 24 96 kg/m²  Physical Exam  Exam conducted with a chaperone present  Constitutional:       General: She is not in acute distress  Appearance: She is well-developed  She is not diaphoretic  HENT:      Head: Normocephalic and atraumatic  Eyes:      Pupils: Pupils are equal, round, and reactive to light  Cardiovascular:      Rate and Rhythm: Normal rate  Pulses: Normal pulses  Pulmonary:      Effort: Pulmonary effort is normal  No respiratory distress  Abdominal:      General: There is no distension  Palpations: Abdomen is soft  There is no mass  Tenderness:  There is no abdominal tenderness  There is no guarding  Comments: gravid   Genitourinary:     General: Normal vulva  Musculoskeletal:         General: No deformity  Normal range of motion  Cervical back: Normal range of motion  Right lower leg: No edema  Left lower leg: No edema  Skin:     General: Skin is warm and dry  Neurological:      General: No focal deficit present  Mental Status: She is alert  Mental status is at baseline     Psychiatric:         Mood and Affect: Mood normal          Behavior: Behavior normal              TAUS: Vertex    Cervix:   3/60/-1    Fetal heart rate:   Baseline Rate: 130 bpm  Variability: Moderate 6-25 bpm  Decelerations: None    [place]:   Contraction Frequency (minutes): 1 5-3 5  Contraction Duration (seconds): 60-80  Contraction Quality: Mild    EFW: 6lbs    GBS: Negative    Prenatal Labs:   Blood Type:   Lab Results   Component Value Date/Time    ABO Grouping AB 05/18/2022 09:28 AM     , D (Rh type):   Lab Results   Component Value Date/Time    Rh Factor Positive 05/18/2022 09:28 AM     , HCT/HGB:   Lab Results   Component Value Date/Time    Hematocrit 34 1 (L) 11/28/2022 07:15 AM    Hemoglobin 10 2 (L) 11/28/2022 07:15 AM      , MCV:   Lab Results   Component Value Date/Time    MCV 81 (L) 11/28/2022 07:15 AM      , Platelets:   Lab Results   Component Value Date/Time    Platelets 811 (H) 08/57/9679 07:15 AM      , 1 hour Glucola:   Lab Results   Component Value Date/Time    Glucose 115 09/27/2022 10:00 AM   , Varicella:   Lab Results   Component Value Date/Time    Varicella IgG IMMUNE 05/18/2022 09:28 AM       , Rubella:   Lab Results   Component Value Date/Time    RUBELLA IGG QUANTITATION >175 0 08/31/2015 11:19 AM    Rubella IgG Quant >175 0 05/18/2022 09:28 AM        , VDRL/RPR:   Lab Results   Component Value Date/Time    RPR Non-Reactive 09/08/2022 09:25 AM      , Urine Culture/Screen:   Lab Results   Component Value Date/Time    Urine Culture <10,000 cfu/ml 2022 09:28 AM      , Hep B:   Lab Results   Component Value Date/Time    Hepatitis B Surface Ag Non-reactive 2022 09:28 AM    , HIV:   Lab Results   Component Value Date/Time    HIV-1/HIV-2 Ab Non-Reactive 2022 09:28 AM     , Chlamydia: Negative   , Gonorrhea:   Lab Results   Component Value Date/Time    N gonorrhoeae, DNA Probe Negative 2022 10:01 AM    N gonorrhoeae, DNA Probe N  gonorrhoeae Amplified DNA Negative 10/04/2017 02:57 PM     , Group B Strep:    Lab Results   Component Value Date/Time    Strep Grp B PCR Negative 2022 10:01 AM          Invasive Devices     None                   Assessment/Plan     ASSESSMENT:  33yo  at 37w3d weeks gestation who is being admitted for active labor, ailyn regularly and with cervical change from 1cm previously to 3cm  PLAN:   1) Admit   2) CBC, RPR, Blood Type   3) Start with expectant management   4) GBS negative status: no PCN for prophylaxis    5) Analgesia and/or epidural at patient request   6) Anticipate    7) Discussed with Dr Mustapha Sandoval    This patient will be an INPATIENT  and I certify the anticipated length of stay is >2 Midnights      Lotus Cortés MD  2022  7:10 AM

## 2022-11-28 NOTE — DISCHARGE SUMMARY
Discharge Summary - OB/GYN   Cristy Schmidt 29 y o  female MRN: [id number]  Unit/Bed#: -01 Encounter: [id number]      Admission Date: 2022     Discharge Date: 2022    Admission Diagnosis:   IUP @ 37w3d   Labor   Short interval pregnancy   Hx depression   Hx breech presentation in pregnancy      Discharge Diagnosis:    @ 37w3d   Labor   Short interval pregnancy   Hx depression   Hx breech presentation in pregnancy     Procedures: spontaneous vaginal delivery    Admitting Attending: Davin Stewart MD  Delivery Attending: No att  providers found  Discharge Attending: Davin Stewart MD    Hospital Course:     Cristy Schmidt is a 29 y o  Maryellen Graft at 37w3d wks who was initially admitted for labor  Patient's pregnancy was complicated by the above  She was expectantly managed  When her cervix was not changing, she was started on pitocin titration  She had an epidural placed and amniotomy was completed for clear fluid  She progressed to complete and started to push  She delivered a viable male  on 22 at 1640  Weight 7lbs 13 2oz via spontaneous vaginal delivery  Apgars were 8 (1 min) and 9 (5 min)   was transferred to  nursery briefly for suctioning and concern for no/weak cry  Patient tolerated the procedure well and was transferred to recovery in stable condition  Her postpartum course was uncomplicated  Her postpartum pain was well controlled with oral analgesics  On day of discharge, she was ambulating and able to reasonably perform all ADLs  She was voiding and had appropriate bowel function  Pain was well controlled  She was discharged home on post-partum day #1 without complications  Patient was instructed to follow up with her OB as an outpatient and was given appropriate warnings to call provider if she develops signs of infection or uncontrolled pain      Complications: none apparent    Condition at discharge: stable     Discharge instructions/Information to patient and family:   - Do not place anything (no partner, tampons or douche) in your vagina for 6 weeks  -You may walk for exercise for the first 6 weeks then gradually return to your usual activities    -Please do not drive for 1 week if you have no stitches and for 2 weeks if you have stitches or underwent a  delivery     -You may take baths or shower per your preference    -Please look at your bust (breasts) in the mirror daily and call for redness or tenderness or increased warmth    -Please call us for temperature > 100 4*F or 38* C, worsening pain or a foul discharge  Discharge Medications:   Prenatal vitamin daily for 6 months or the duration of nursing whichever is longer    Motrin 600 mg orally every 6 hours as needed for pain  Tylenol (over the counter) per bottle directions as needed for pain: do NOT use with percocet  Hydrocortisone cream 1% (over the counter) applied 1-2x daily to hemorrhoids as needed    Planned Readmission: No

## 2022-11-28 NOTE — PLAN OF CARE
Problem: ANTEPARTUM  Goal: Maintain pregnancy as long as maternal and/or fetal condition is stable  Description: INTERVENTIONS:  - Maternal surveillance  - Fetal surveillance  - Monitor uterine activity  - Medications as ordered  - Bedrest  Outcome: Progressing     Problem: BIRTH - VAGINAL/ SECTION  Goal: Fetal and maternal status remain reassuring during the birth process  Description: INTERVENTIONS:  - Monitor vital signs  - Monitor fetal heart rate  - Monitor uterine activity  - Monitor labor progression (vaginal delivery)  - DVT prophylaxis  - Antibiotic prophylaxis  Outcome: Progressing  Goal: Emotionally satisfying birthing experience for mother/fetus  Description: Interventions:  - Assess, plan, implement and evaluate the nursing care given to the patient in labor  - Advocate the philosophy that each childbirth experience is a unique experience and support the family's chosen level of involvement and control during the labor process   - Actively participate in both the patient's and family's teaching of the birth process  - Consider cultural, Yarsanism and age-specific factors and plan care for the patient in labor  Outcome: Progressing     Problem: POSTPARTUM  Goal: Experiences normal postpartum course  Description: INTERVENTIONS:  - Monitor maternal vital signs  - Assess uterine involution and lochia  Outcome: Progressing  Goal: Appropriate maternal -  bonding  Description: INTERVENTIONS:  - Identify family support  - Assess for appropriate maternal/infant bonding   -Encourage maternal/infant bonding opportunities  - Referral to  or  as needed  Outcome: Progressing  Goal: Establishment of infant feeding pattern  Description: INTERVENTIONS:  - Assess breast/bottle feeding  - Refer to lactation as needed  Outcome: Progressing  Goal: Incision(s), wounds(s) or drain site(s) healing without S/S of infection  Description: INTERVENTIONS  - Assess and document dressing, incision, wound bed, drain sites and surrounding tissue  - Provide patient and family education  Outcome: Progressing     Problem: Knowledge Deficit  Goal: Verbalizes understanding of labor plan  Description: Assess patient/family/caregiver's baseline knowledge level and ability to understand information  Provide education via patient/family/caregiver's preferred learning method at appropriate level of understanding  1  Provide teaching at level of understanding  2  Provide teaching via preferred learning method(s)  Outcome: Progressing     Problem: Labor & Delivery  Goal: Manages discomfort  Description: Assess and monitor for signs and symptoms of discomfort  Assess patient's pain level regularly and per hospital policy  Administer medications as ordered  Support use of nonpharmacological methods to help control pain such as distraction, imagery, relaxation, and application of heat and cold  Collaborate with interdisciplinary team and patient to determine appropriate pain management plan  1  Include patient in decisions related to comfort  2  Offer non-pharmacological pain management interventions  3  Report ineffective pain management to physician  Outcome: Progressing  Goal: Patient vital signs are stable  Description: 1  Assess vital signs - vaginal delivery    Outcome: Progressing

## 2022-11-28 NOTE — OB LABOR/OXYTOCIN SAFETY PROGRESS
Labor Progress Note - Treva Rajan 29 y o  female MRN: [id number]    Unit/Bed#: -01 Encounter: [id number]    Dose (farnaz-units/min) Oxytocin: 4 farnaz-units/min  Contraction Frequency (minutes): 2-3 5  Contraction Quality: Mild  Tachysystole: No   Cervical Dilation: 6-7        Cervical Effacement: 100  Fetal Station: 0  Baseline Rate: 120 bpm  Fetal Heart Rate: 131 BPM  FHR Category: Category I  Oxytocin Safety Progress Check: Safety check completed  Provider Notified: Yes  Provider Name: Barbara Flanagan      Vital Signs:   Vitals:    11/28/22 1337   BP: 107/57   Pulse: 68   Resp:    Temp:    SpO2:        Notes/comments:   Patient feeling more pressure  SVE at 1310 6-7/100/0  Continue pitocin titration  Repeat SVE in 2 hours or sooner if feeling more constant pressure before then  D/w Dr Iker Harrington MD 11/28/2022 1:40 PM

## 2022-11-28 NOTE — OB LABOR/OXYTOCIN SAFETY PROGRESS
Labor Progress Note - Sommer Foster 29 y o  female MRN: [id number]    Unit/Bed#: -01 Encounter: [id number]       Contraction Frequency (minutes): 3-4  Contraction Quality: Moderate  Tachysystole: No   Cervical Dilation: 3        Cervical Effacement: 60  Fetal Station: -1  Baseline Rate: 125 bpm  Fetal Heart Rate: 121 BPM  FHR Category: Category I  Oxytocin Safety Progress Check: Safety check completed            Vital Signs:   Vitals:    11/28/22 0936   BP: 95/54   Pulse: 77   Resp:    Temp:    SpO2:        Notes/comments:   Patient comfortable with epidural    S/p SVE at 0910  Unchanged from prior  Contractions are spacing to q3-4 min  Will plan to start pitocin titration for labor augmentation  D/w Dr Clifton Joseph MD 11/28/2022 9:41 AM

## 2022-11-28 NOTE — ANESTHESIA PROCEDURE NOTES
Epidural Block    Patient location during procedure: OB  Start time: 11/28/2022 8:24 AM  Staffing  Performed: Anesthesiologist   Anesthesiologist: Deysi Lynch MD  Preanesthetic Checklist  Completed: patient identified, IV checked, site marked, risks and benefits discussed, surgical consent, monitors and equipment checked, pre-op evaluation and timeout performed  Epidural  Patient position: sitting  Prep: Betadine  Patient monitoring: frequent blood pressure checks, continuous pulse ox and heart rate  Approach: midline  Location: lumbar  Injection technique: SEEMA saline  Needle  Needle type: Tuohy   Needle gauge: 18 G  Catheter type: side hole  Catheter size: 20 G  Catheter at skin depth: 12 cm  Catheter securement method: surgical tape  Test dose: negative  Assessment  Number of attempts: 1negative aspiration for CSF, negative aspiration for heme and no paresthesia on injection  patient tolerated the procedure well with no immediate complications

## 2022-11-28 NOTE — OB LABOR/OXYTOCIN SAFETY PROGRESS
Oxytocin Safety Progress Check Note - Kenny Moore 29 y o  female MRN: [id number]    Unit/Bed#: -01 Encounter: [id number]    Dose (farnaz-units/min) Oxytocin: 4 farnaz-units/min  Contraction Frequency (minutes): 2  Contraction Quality: Mild  Tachysystole: No     Cervical Dilation: 10  Dilation Complete Date: 22  Dilation Complete Time: 1630  Cervical Effacement: 100  Fetal Station: 4     Baseline Rate: 125 bpm  Fetal Heart Rate: 125 BPM  FHR Category: Category I    Pt feeling slightly more rectal pressure with contractions  SVE as above  Anticipate       Vital Signs:   Vitals:    22 1622   BP: 109/58   Pulse: 88   Resp:    Temp:    SpO2:      D/w Dr Palak Dixon MD 2022 4:33 PM

## 2022-11-28 NOTE — TELEPHONE ENCOUNTER
Reason for Disposition  • MILD-MODERATE vaginal bleeding (e g , small to medium clots; like mild menstrual period) (Exception: Single episode of faint spotting when wiping, or slight spotting after intercourse or pelvic exam)    Answer Assessment - Initial Assessment Questions  1  ONSET: "When did this bleeding start?"        37 weeks pregnant and having bleeding bright red bleeding     2  DESCRIPTION: "Describe the bleeding that you are having " "How much bleeding is there?"     - SPOTTING: spotting, or pinkish / brownish mucous discharge; does not fill panti-liner or pad     - MILD:  less than 1 pad / hour; less than patient's usual menstrual bleeding    - MODERATE: 1-2 pads / hour; 1 menstrual cup every 6 hours; small-medium blood clots (e g , pea, grape, small coin)    - SEVERE: soaking 2 or more pads/hour for 2 or more hours; 1 menstrual cup every 2 hours; bleeding not contained by pads or continuous red blood from vagina; large blood clots (e g , golf ball, large coin)         No clots change pad once     3  ABDOMINAL PAIN SEVERITY: If present, ask: "How bad is it?"  (e g , Scale 1-10; mild, moderate, or severe)    - MILD (1-3): doesn't interfere with normal activities, abdomen soft and not tender to touch     - MODERATE (4-7): interferes with normal activities or awakens from sleep, tender to touch     - SEVERE (8-10): excruciating pain, doubled over, unable to do any normal activities     Pelvic pressure     4  PREGNANCY: "Do you know how many weeks or months pregnant you are?"      37 weeks     5  TAVO: "What date are you expecting to deliver?"     [facility]   12/16 22    6  FETAL MOVEMENT: "Has the baby's movement decreased or changed significantly from normal?"      Yes     7  HEMODYNAMIC STATUS: "Are you weak or feeling lightheaded?" If Yes, ask: "Can you stand and walk normally?"      No     8   OTHER SYMPTOMS: "What other symptoms are you having with the bleeding?" (e g , leaking fluid from vagina, contractions)     Bleeding    Protocols used: PREGNANCY - VAGINAL BLEEDING GREATER THAN 20 WEEKS [id number]

## 2022-11-28 NOTE — ANESTHESIA PREPROCEDURE EVALUATION
Procedure:  LABOR ANALGESIA    Relevant Problems   CARDIO   (+) Chronic migraine without aura, not intractable      GYN   (+) 37 weeks gestation of pregnancy   (+) Short interval between pregnancies affecting pregnancy in third trimester, antepartum      NEURO/PSYCH   (+) Anxiety with depression   (+) Chronic migraine without aura, not intractable   (+) Depression with anxiety        Physical Exam    Airway    Mallampati score: I  TM Distance: >3 FB  Neck ROM: full     Dental       Cardiovascular      Pulmonary      Other Findings        Anesthesia Plan  ASA Score- 2     Anesthesia Type- epidural with ASA Monitors  Additional Monitors:   Airway Plan:           Plan Factors-    Chart reviewed  Existing labs reviewed  Patient summary reviewed  Patient is not a current smoker  Patient did not smoke on day of surgery  Induction-     Postoperative Plan-     Informed Consent- Anesthetic plan and risks discussed with patient  I personally reviewed this patient with the CRNA  Discussed and agreed on the Anesthesia Plan with the ROSALIA Carl

## 2022-11-29 VITALS
HEART RATE: 76 BPM | RESPIRATION RATE: 20 BRPM | OXYGEN SATURATION: 99 % | BODY MASS INDEX: 24.99 KG/M2 | TEMPERATURE: 98.1 F | DIASTOLIC BLOOD PRESSURE: 54 MMHG | HEIGHT: 65 IN | SYSTOLIC BLOOD PRESSURE: 90 MMHG | WEIGHT: 150 LBS

## 2022-11-29 PROBLEM — O09.893 SHORT INTERVAL BETWEEN PREGNANCIES AFFECTING PREGNANCY IN THIRD TRIMESTER, ANTEPARTUM: Status: RESOLVED | Noted: 2022-08-11 | Resolved: 2022-11-29

## 2022-11-29 PROBLEM — Z3A.37 37 WEEKS GESTATION OF PREGNANCY: Status: RESOLVED | Noted: 2022-06-12 | Resolved: 2022-11-29

## 2022-11-29 RX ADMIN — DOCUSATE SODIUM 100 MG: 100 CAPSULE, LIQUID FILLED ORAL at 08:52

## 2022-11-29 RX ADMIN — DOCUSATE SODIUM 100 MG: 100 CAPSULE, LIQUID FILLED ORAL at 18:40

## 2022-11-29 RX ADMIN — Medication 1 APPLICATION: at 20:19

## 2022-11-29 RX ADMIN — IBUPROFEN 600 MG: 600 TABLET, FILM COATED ORAL at 11:53

## 2022-11-29 RX ADMIN — IBUPROFEN 600 MG: 600 TABLET, FILM COATED ORAL at 00:07

## 2022-11-29 RX ADMIN — ACETAMINOPHEN 650 MG: 325 TABLET, FILM COATED ORAL at 16:13

## 2022-11-29 RX ADMIN — ACETAMINOPHEN 650 MG: 325 TABLET, FILM COATED ORAL at 08:52

## 2022-11-29 NOTE — PLAN OF CARE
Problem: ANTEPARTUM  Goal: Maintain pregnancy as long as maternal and/or fetal condition is stable  Description: INTERVENTIONS:  - Maternal surveillance  - Fetal surveillance  - Monitor uterine activity  - Medications as ordered  - Bedrest  Outcome: Progressing     Problem: BIRTH - VAGINAL/ SECTION  Goal: Fetal and maternal status remain reassuring during the birth process  Description: INTERVENTIONS:  - Monitor vital signs  - Monitor fetal heart rate  - Monitor uterine activity  - Monitor labor progression (vaginal delivery)  - DVT prophylaxis  - Antibiotic prophylaxis  Outcome: Progressing  Goal: Emotionally satisfying birthing experience for mother/fetus  Description: Interventions:  - Assess, plan, implement and evaluate the nursing care given to the patient in labor  - Advocate the philosophy that each childbirth experience is a unique experience and support the family's chosen level of involvement and control during the labor process   - Actively participate in both the patient's and family's teaching of the birth process  - Consider cultural, Quaker and age-specific factors and plan care for the patient in labor  Outcome: Progressing     Problem: POSTPARTUM  Goal: Experiences normal postpartum course  Description: INTERVENTIONS:  - Monitor maternal vital signs  - Assess uterine involution and lochia  Outcome: Progressing  Goal: Appropriate maternal -  bonding  Description: INTERVENTIONS:  - Identify family support  - Assess for appropriate maternal/infant bonding   -Encourage maternal/infant bonding opportunities  - Referral to  or  as needed  Outcome: Progressing  Goal: Establishment of infant feeding pattern  Description: INTERVENTIONS:  - Assess breast/bottle feeding  - Refer to lactation as needed  Outcome: Progressing  Goal: Incision(s), wounds(s) or drain site(s) healing without S/S of infection  Description: INTERVENTIONS  - Assess and document dressing, incision, wound bed, drain sites and surrounding tissue  - Provide patient and family education  Outcome: Progressing     Problem: Knowledge Deficit  Goal: Verbalizes understanding of labor plan  Description: Assess patient/family/caregiver's baseline knowledge level and ability to understand information  Provide education via patient/family/caregiver's preferred learning method at appropriate level of understanding  1  Provide teaching at level of understanding  2  Provide teaching via preferred learning method(s)  Outcome: Progressing     Problem: Labor & Delivery  Goal: Manages discomfort  Description: Assess and monitor for signs and symptoms of discomfort  Assess patient's pain level regularly and per hospital policy  Administer medications as ordered  Support use of nonpharmacological methods to help control pain such as distraction, imagery, relaxation, and application of heat and cold  Collaborate with interdisciplinary team and patient to determine appropriate pain management plan  1  Include patient in decisions related to comfort  2  Offer non-pharmacological pain management interventions  3  Report ineffective pain management to physician  Outcome: Progressing  Goal: Patient vital signs are stable  Description: 1  Assess vital signs - vaginal delivery    Outcome: Progressing

## 2022-11-29 NOTE — PROGRESS NOTES
Progress Note - OB/GYN  Isa Jordan 29 y o  female MRN: [id number]  Unit/Bed#:  321-01 Encounter: [id number]    Assessment and Plan     Isa Jordan is a patient of: J[facility]  She is PPD# 1 s/p  spontaneous vaginal delivery  Recovering well and is stable       *  (spontaneous vaginal delivery)  Assessment & Plan  PPD1  Lochia WNL   Recovering well   Appropriate bowel and bladder function   Pain well controlled   Tolerating diet   Breastfeeding  Ambulating without issues   No lower extremity tenderness        Disposition    - Anticipate discharge home on PPD# 1 if baby is stable for d/c as per neonatology/pediatrics      Subjective/Objective     Chief Complaint: Postpartum State     Subjective:    Isa Jordan is PPD #1 s/p  spontaneous vaginal delivery  She has no current complaints  Pain is well controlled  Patient is currently voiding  She is ambulating  Patient is currently passing flatus and has had no bowel movement  She is tolerating PO, and denies nausea or vomitting  Patient denies fever, chills, chest pain, shortness of breath, or calf tenderness  Lochia is minimal  She is  Breastfeeding  She is recovering well and is stable         Vitals:   BP 99/55 (BP Location: Right arm)   Pulse 69   Temp 97 7 °F (36 5 °C) (Oral)   Resp 18   Ht 5' 5" (1 651 m)   Wt 68 kg (150 lb)   LMP 2022 (Exact Date)   SpO2 99%   Breastfeeding Yes   BMI 24 96 kg/m²       Intake/Output Summary (Last 24 hours) at 2022 0710  Last data filed at 2022 2200  Gross per 24 hour   Intake 3991 87 ml   Output 1850 ml   Net 2141 87 ml       Invasive Devices     Peripheral Intravenous Line  Duration           Peripheral IV 22 Left;Ventral (anterior) Forearm <1 day                Physical Exam:   GEN: Isa Jordan appears well, alert and oriented x 3, pleasant and cooperative   CARDIO: RRR, no murmurs or rubs  RESP:  CTAB, no wheezes or rales  ABDOMEN: soft, no tenderness, no distention, fundus @ 2 cm below umbilicus   EXTREMITIES: SCDs on, non tender, no erythema,      Labs:     Hemoglobin   Date Value Ref Range Status   11/28/2022 10 2 (L) 11 5 - 15 4 g/dL Final   09/08/2022 10 7 (L) 11 5 - 15 4 g/dL Final     WBC   Date Value Ref Range Status   11/28/2022 11 40 (H) 4 31 - 10 16 Thousand/uL Final   09/08/2022 10 04 4 31 - 10 16 Thousand/uL Final     Platelets   Date Value Ref Range Status   11/28/2022 420 (H) 149 - 390 Thousands/uL Final   09/08/2022 405 (H) 149 - 390 Thousands/uL Final     Creatinine   Date Value Ref Range Status   11/02/2021 0 65 0 60 - 1 30 mg/dL Final     Comment:     Standardized to IDMS reference method   04/30/2019 0 64 0 60 - 1 30 mg/dL Final     Comment:     Standardized to IDMS reference method     AST   Date Value Ref Range Status   11/02/2021 16 5 - 45 U/L Final     Comment:     Specimen collection should occur prior to Sulfasalazine administration due to the potential for falsely depressed results  04/30/2019 16 5 - 45 U/L Final     Comment:       Specimen collection should occur prior to Sulfasalazine administration due to the potential for falsely depressed results  ALT   Date Value Ref Range Status   11/02/2021 20 12 - 78 U/L Final     Comment:     Specimen collection should occur prior to Sulfasalazine and/or Sulfapyridine administration due to the potential for falsely depressed results  04/30/2019 19 12 - 78 U/L Final     Comment:       Specimen collection should occur prior to Sulfasalazine and/or Sulfapyridine administration due to the potential for falsely depressed results             Audrey Ortiz DO  11/29/2022  7:10 AM

## 2022-11-29 NOTE — ASSESSMENT & PLAN NOTE
PPD1  Lochia WNL   Recovering well   Appropriate bowel and bladder function   Pain well controlled   Tolerating diet   Breastfeeding  Ambulating without issues   No lower extremity tenderness

## 2022-11-29 NOTE — ANESTHESIA POSTPROCEDURE EVALUATION
Post-Op Assessment Note    CV Status:  Stable    Pain management: satisfactory to patient     Mental Status:  Alert and awake   Hydration Status:  Euvolemic   PONV Controlled:  Controlled   Airway Patency:  Patent      Post Op Vitals Reviewed: Yes      Staff: Anesthesiologist, CRNA     Post-op block assessment: catheter intact and no complications      No notable events documented      AVSS

## 2022-11-29 NOTE — PLAN OF CARE
Problem: ANTEPARTUM  Goal: Maintain pregnancy as long as maternal and/or fetal condition is stable  Description: INTERVENTIONS:  - Maternal surveillance  - Fetal surveillance  - Monitor uterine activity  - Medications as ordered  - Bedrest  2022 by Heidy Bartlett RN  Outcome: Progressing  2022 by Heidy Bartlett RN  Outcome: Progressing     Problem: POSTPARTUM  Goal: Experiences normal postpartum course  Description: INTERVENTIONS:  - Monitor maternal vital signs  - Assess uterine involution and lochia  2022 by Heidy Bartlett RN  Outcome: Progressing  2022 by Heidy Bartlett RN  Outcome: Progressing  Goal: Appropriate maternal -  bonding  Description: INTERVENTIONS:  - Identify family support  - Assess for appropriate maternal/infant bonding   -Encourage maternal/infant bonding opportunities  - Referral to  or  as needed  2022 [phone] by Heiyd Bartlett RN  Outcome: Progressing  2022 by Heidy Bartlett RN  Outcome: Progressing  Goal: Establishment of infant feeding pattern  Description: INTERVENTIONS:  - Assess breast/bottle feeding  - Refer to lactation as needed  2022 by Heidy Bartlett RN  Outcome: Progressing  2022 by Heidy Bartlett RN  Outcome: Progressing  Goal: Incision(s), wounds(s) or drain site(s) healing without S/S of infection  Description: INTERVENTIONS  - Assess and document dressing, incision, wound bed, drain sites and surrounding tissue  - Provide patient and family education  - Perform skin care/dressing changes  2022 by Heidy Bartlett RN  Outcome: Progressing  2022 by Heidy Bartlett RN  Outcome: Progressing     Problem: PAIN - ADULT  Goal: Verbalizes/displays adequate comfort level or baseline comfort level  Description: Interventions:  - Encourage patient to monitor pain and request assistance  - Assess pain using appropriate pain scale  - Administer analgesics based on type and severity of pain and evaluate response  - Implement non-pharmacological measures as appropriate and evaluate response  - Consider cultural and social influences on pain and pain management  - Notify physician/advanced practitioner if interventions unsuccessful or patient reports new pain  Outcome: Progressing     Problem: INFECTION - ADULT  Goal: Absence or prevention of progression during hospitalization  Description: INTERVENTIONS:  - Assess and monitor for signs and symptoms of infection  - Monitor lab/diagnostic results  - Monitor all insertion sites, i e  indwelling lines, tubes, and drains  - Monitor endotracheal if appropriate and nasal secretions for changes in amount and color  - [place] appropriate cooling/warming therapies per order  - Administer medications as ordered  - Instruct and encourage patient and family to use good hand hygiene technique  - Identify and instruct in appropriate isolation precautions for identified infection/condition  Outcome: Progressing  Goal: Absence of fever/infection during neutropenic period  Description: INTERVENTIONS:  - Monitor WBC    Outcome: Progressing     Problem: SAFETY ADULT  Goal: Patient will remain free of falls  Description: INTERVENTIONS:  - Educate patient/family on patient safety including physical limitations  - Instruct patient to call for assistance with activity   - Consult OT/PT to assist with strengthening/mobility   - Keep Call bell within reach  - Keep bed low and locked with side rails adjusted as appropriate  - Keep care items and personal belongings within reach  - Initiate and maintain comfort rounds  - Make Fall Risk Sign visible to staff  - Offer Toileting   - Initiate/Maintain alarm  - Obtain necessary fall risk management equipment:   - Apply yellow socks and bracelet for high fall risk patients  - Consider moving patient to room near nurses station  Outcome: Progressing  Goal: Maintain or return to baseline ADL function  Description: INTERVENTIONS:  -  Assess patient's ability to carry out ADLs; assess patient's baseline for ADL function and identify physical deficits which impact ability to perform ADLs (bathing, care of mouth/teeth, toileting, grooming, dressing, etc )  - Assess/evaluate cause of self-care deficits   - Assess range of motion  - Assess patient's mobility; develop plan if impaired  - Assess patient's need for assistive devices and provide as appropriate  - Encourage maximum independence but intervene and supervise when necessary  - Involve family in performance of ADLs  - Assess for home care needs following discharge   - Consider OT consult to assist with ADL evaluation and planning for discharge  - Provide patient education as appropriate  Outcome: Progressing  Goal: Maintains/Returns to pre admission functional level  Description: INTERVENTIONS:  - Perform BMAT or MOVE assessment daily    - Set and communicate daily mobility goal to care team and patient/family/caregiver  - Collaborate with rehabilitation services on mobility goals if consulted  - Out of bed for toileting  - Record patient progress and toleration of activity level   Outcome: Progressing     Problem: Knowledge Deficit  Goal: Patient/family/caregiver demonstrates understanding of disease process, treatment plan, medications, and discharge instructions  Description: Complete learning assessment and assess knowledge base    Interventions:  - Provide teaching at level of understanding  - Provide teaching via preferred learning methods  Outcome: Progressing     Problem: DISCHARGE PLANNING  Goal: Discharge to home or other facility with appropriate resources  Description: INTERVENTIONS:  - Identify barriers to discharge w/patient and caregiver  - Arrange for needed discharge resources and transportation as appropriate  - Identify discharge learning needs (meds, wound care, etc )  - Arrange for interpretive services to assist at discharge as needed  - Refer to Case Management Department for coordinating discharge planning if the patient needs post-hospital services based on physician/advanced practitioner order or complex needs related to functional status, cognitive ability, or social support system  Outcome: Progressing

## 2022-11-29 NOTE — LACTATION NOTE
This note was copied from a baby's chart  CONSULT - LACTATION  Baby Boy Ranjit Lee) Dilia Randall 1 days male MRN: [id number]    [facility] NURSERY Room / Bed: (N)/(N) Encounter: [id number]    Maternal Information     MOTHER:  Milagros Orozco  Maternal Age: 29 y o    OB History: # 1 - Date: 03/21/21, Sex: Male, Weight: 3550 g (7 lb 13 2 oz), GA: 39w3d, Delivery: Vaginal, Spontaneous, Apgar1: 8, Apgar5: 9, Living: Living, Birth Comments: None    # 2 - Date: 11/28/22, Sex: Male, Weight: 3070 g (6 lb 12 3 oz), GA: 37w3d, Delivery: Vaginal, Spontaneous, Apgar1: 8, Apgar5: 9, Living: Living, Birth Comments: None   Previouse breast reduction surgery? No    Lactation history:   Has patient previously breast fed: Yes   How long had patient previously breast fed: 8 mo   Previous breast feeding complications: None     Past Surgical History:   Procedure Laterality Date   • COLPOSCOPY     • GYNECOLOGIC CRYOSURGERY     • [place] COLONOSCOPY FLX DX W/COLLJ SPEC WHEN PFRMD N/A 8/24/2016    Procedure: EGD AND COLONOSCOPY;  Surgeon: Zackery Caraballo MD;  Location: [facility]; Service: Gastroenterology   • WISDOM TOOTH EXTRACTION      all four        Birth information:  YOB: 2020   Time of birth: 4:40 PM   Sex: male   Delivery type: Vaginal, Spontaneous   Birth Weight: 3070 g (6 lb 12 3 oz)   Percent of Weight Change: -1%     Gestational Age: 44w3d   [unfilled]      Feeding recommendations:  breast feed on demand     Met with mother  Provided mother with Ready, Set, Baby booklet  Discussed Skin to Skin contact an benefits to mom and baby  Talked about the delay of the first bath until baby has adjusted  Spoke about the benefits of rooming in  Feeding on cue and what that means for recognizing infant's hunger  Avoidance of pacifiers for the first month discussed  Talked about exclusive breastfeeding for the first 6 months      Positioning and latch reviewed as well as showing images of other feeding positions  Discussed the properties of a good latch in any position  Reviewed hand/manual expression  Discussed s/s that baby is getting enough milk and some s/s that breastfeeding dyad may need further help  Gave information on common concerns, what to expect the first few weeks after delivery, preparing for other caregivers, and how partners can help  Resources for support also provided  Information on hand expression given  Discussed benefits of knowing how to manually express breast including stimulating milk supply, softening nipple for latch and evacuating breast in the event of engorgement  Discussed 2nd night syndrome and ways to calm infant  Hand out given  Provided DC booklet at this time, enc family to review and prepare questions for day of DC  Mom has a breast pump at home       Anabel Redman RN 11/29/2022 5:40 PM

## 2022-11-30 NOTE — PLAN OF CARE
Problem: ANTEPARTUM  Goal: Maintain pregnancy as long as maternal and/or fetal condition is stable  Description: INTERVENTIONS:  - Maternal surveillance  - Fetal surveillance  - Monitor uterine activity  - Medications as ordered  - Bedrest  Outcome: Completed     Problem: POSTPARTUM  Goal: Experiences normal postpartum course  Description: INTERVENTIONS:  - Monitor maternal vital signs  - Assess uterine involution and lochia  Outcome: Completed  Goal: Appropriate maternal -  bonding  Description: INTERVENTIONS:  - Identify family support  - Assess for appropriate maternal/infant bonding   -Encourage maternal/infant bonding opportunities  - Referral to  or  as needed  Outcome: Completed  Goal: Establishment of infant feeding pattern  Description: INTERVENTIONS:  - Assess breast/bottle feeding  - Refer to lactation as needed  Outcome: Completed  Goal: Incision(s), wounds(s) or drain site(s) healing without S/S of infection  Description: INTERVENTIONS  - Assess and document dressing, incision, wound bed, drain sites and surrounding tissue  - Provide patient and family education  - Perform skin care/dressing changes every   Outcome: Completed     Problem: PAIN - ADULT  Goal: Verbalizes/displays adequate comfort level or baseline comfort level  Description: Interventions:  - Encourage patient to monitor pain and request assistance  - Assess pain using appropriate pain scale  - Administer analgesics based on type and severity of pain and evaluate response  - Implement non-pharmacological measures as appropriate and evaluate response  - Consider cultural and social influences on pain and pain management  - Notify physician/advanced practitioner if interventions unsuccessful or patient reports new pain  Outcome: Completed     Problem: INFECTION - ADULT  Goal: Absence or prevention of progression during hospitalization  Description: INTERVENTIONS:  - Assess and monitor for signs and symptoms of infection  - Monitor lab/diagnostic results  - Monitor all insertion sites, i e  indwelling lines, tubes, and drains  - Monitor endotracheal if appropriate and nasal secretions for changes in amount and color  - [place] appropriate cooling/warming therapies per order  - Administer medications as ordered  - Instruct and encourage patient and family to use good hand hygiene technique  - Identify and instruct in appropriate isolation precautions for identified infection/condition  Outcome: Completed  Goal: Absence of fever/infection during neutropenic period  Description: INTERVENTIONS:  - Monitor WBC    Outcome: Completed     Problem: SAFETY ADULT  Goal: Patient will remain free of falls  Description: INTERVENTIONS:  - Educate patient/family on patient safety including physical limitations  - Instruct patient to call for assistance with activity   - Consult OT/PT to assist with strengthening/mobility   - Keep Call bell within reach  - Keep bed low and locked with side rails adjusted as appropriate  - Keep care items and personal belongings within reach  - Initiate and maintain comfort rounds  - Make Fall Risk Sign visible to staff  - Offer Toileting every Hours, in advance of need  - Initiate/Maintain alarm  - Obtain necessary fall risk management equipment:   - Apply yellow socks and bracelet for high fall risk patients  - Consider moving patient to room near nurses station  Outcome: Completed  Goal: Maintain or return to baseline ADL function  Description: INTERVENTIONS:  -  Assess patient's ability to carry out ADLs; assess patient's baseline for ADL function and identify physical deficits which impact ability to perform ADLs (bathing, care of mouth/teeth, toileting, grooming, dressing, etc )  - Assess/evaluate cause of self-care deficits   - Assess range of motion  - Assess patient's mobility; develop plan if impaired  - Assess patient's need for assistive devices and provide as appropriate  - Encourage maximum independence but intervene and supervise when necessary  - Involve family in performance of ADLs  - Assess for home care needs following discharge   - Consider OT consult to assist with ADL evaluation and planning for discharge  - Provide patient education as appropriate  Outcome: Completed  Goal: Maintains/Returns to pre admission functional level  Description: INTERVENTIONS:  - Perform BMAT or MOVE assessment daily    - Set and communicate daily mobility goal to care team and patient/family/caregiver  - Collaborate with rehabilitation services on mobility goals if consulted  - Record patient progress and toleration of activity level   Outcome: Completed     Problem: Knowledge Deficit  Goal: Patient/family/caregiver demonstrates understanding of disease process, treatment plan, medications, and discharge instructions  Description: Complete learning assessment and assess knowledge base    Interventions:  - Provide teaching at level of understanding  - Provide teaching via preferred learning methods  Outcome: Completed     Problem: DISCHARGE PLANNING  Goal: Discharge to home or other facility with appropriate resources  Description: INTERVENTIONS:  - Identify barriers to discharge w/patient and caregiver  - Arrange for needed discharge resources and transportation as appropriate  - Identify discharge learning needs (meds, wound care, etc )  - Arrange for interpretive services to assist at discharge as needed  - Refer to Case Management Department for coordinating discharge planning if the patient needs post-hospital services based on physician/advanced practitioner order or complex needs related to functional status, cognitive ability, or social support system  Outcome: Completed     Problem: DISCHARGE PLANNING  Goal: Discharge to home or other facility with appropriate resources  Description: INTERVENTIONS:  - Identify barriers to discharge w/patient and caregiver  - Arrange for needed discharge resources and transportation as appropriate  - Identify discharge learning needs (meds, wound care, etc )  - Arrange for interpretive services to assist at discharge as needed  - Refer to Case Management Department for coordinating discharge planning if the patient needs post-hospital services based on physician/advanced practitioner order or complex needs related to functional status, cognitive ability, or social support system  Outcome: Completed

## 2022-12-04 LAB — PLACENTA IN STORAGE: NORMAL

## 2023-01-16 ENCOUNTER — POSTPARTUM VISIT (OUTPATIENT)
Dept: OBGYN CLINIC | Facility: CLINIC | Age: 33
End: 2023-01-16

## 2023-01-16 VITALS
HEIGHT: 65 IN | BODY MASS INDEX: 20.66 KG/M2 | SYSTOLIC BLOOD PRESSURE: 96 MMHG | WEIGHT: 124 LBS | DIASTOLIC BLOOD PRESSURE: 58 MMHG

## 2023-01-16 NOTE — PROGRESS NOTES
Assessment/Plan     Normal postpartum exam      1  Contraception: none  2  Annual exam due in [place]  3  Lactation consult, [address] information discussed  4  Increase activity as tolerated, may resume all normal activity at 6 weeks postpartum  5  Anticipated return to work: 6 - 12 weeks post partum  Jaja Parrish is a 29 y o  female who presents for a postpartum visit  She is 7 weeks postpartum following a spontaneous vaginal delivery  I have fully reviewed the prenatal and intrapartum course  The delivery was at 37 3 gestational weeks  Anesthesia: epidural  Laceration: left labial laceration    Bleeding no bleeding  Bowel function is normal  Bladder function is normal  Patient has not traveled outside the [place]  within the last 14 days  been sexually active  Desired contraception method is none  Postpartum depression screening: negative  EPDS : 2    Baby's course has been unremarkable     Baby is feeding by breast     Last Pap : 2022 ; no abnormalities  Gestational Diabetes: no  Pregnancy Complications: none    The following portions of the patient's history were reviewed and updated as appropriate: allergies, past family history, past social history and problem list       Current Outpatient Medications:   •  Azelaic Acid 15 % cream, , Disp: , Rfl:   •  Butalbital-APAP-Caffeine (Fioricet) -40 MG CAPS, Take 1 tablet every 4 to 6 hours as needed for migraine headache, Disp: 30 capsule, Rfl: 2  •  Docusate Sodium (COLACE PO), Take by mouth, Disp: , Rfl:   •  escitalopram (LEXAPRO) 20 mg tablet, Take 1 tablet (20 mg total) by mouth daily, Disp: 90 tablet, Rfl: 1  •  Prenatal Multivit-Min-Fe-FA (PRE- FORMULA) TABS, Take by mouth daily, Disp: , Rfl:   •  ergocalciferol (ERGOCALCIFEROL) 1 25 MG (85657 UT) capsule, , Disp: , Rfl:     No Known Allergies    Review of Systems  Constitutional: no fever, feels well  Breasts: no complaints of breast pain, breast lump, or nipple discharge  Gastrointestinal: no complaints nausea, vomiting  Genitourinary: as noted in HPI    Neurological: no complaints of headache      Objective      BP 96/58 (BP Location: Right arm, Patient Position: Sitting, Cuff Size: Standard)   Ht 5' 5" (1 651 m)   Wt 56 2 kg (124 lb)   Breastfeeding Yes   BMI 20 63 kg/m²     OBGyn Exam  General: alert and oriented, in no acute distress

## 2023-04-26 ENCOUNTER — OFFICE VISIT (OUTPATIENT)
Dept: FAMILY MEDICINE CLINIC | Facility: CLINIC | Age: 33
End: 2023-04-26

## 2023-04-26 VITALS
HEART RATE: 84 BPM | OXYGEN SATURATION: 98 % | TEMPERATURE: 97.6 F | SYSTOLIC BLOOD PRESSURE: 110 MMHG | WEIGHT: 115.8 LBS | DIASTOLIC BLOOD PRESSURE: 72 MMHG | HEIGHT: 65 IN | BODY MASS INDEX: 19.29 KG/M2 | RESPIRATION RATE: 16 BRPM

## 2023-04-26 DIAGNOSIS — F41.8 ANXIETY WITH DEPRESSION: ICD-10-CM

## 2023-04-26 DIAGNOSIS — G43.709 CHRONIC MIGRAINE WITHOUT AURA WITHOUT STATUS MIGRAINOSUS, NOT INTRACTABLE: Primary | ICD-10-CM

## 2023-04-26 RX ORDER — SUMATRIPTAN 100 MG/1
TABLET, FILM COATED ORAL
Qty: 12 TABLET | Refills: 2 | Status: SHIPPED | OUTPATIENT
Start: 2023-04-26

## 2023-04-26 RX ORDER — SUMATRIPTAN 100 MG/1
TABLET, FILM COATED ORAL
COMMUNITY
End: 2023-04-26 | Stop reason: SDUPTHER

## 2023-04-26 RX ORDER — ESCITALOPRAM OXALATE 20 MG/1
20 TABLET ORAL DAILY
Qty: 90 TABLET | Refills: 3 | Status: SHIPPED | OUTPATIENT
Start: 2023-04-26

## 2023-04-26 NOTE — PROGRESS NOTES
Name: James Parsons      : 1990      MRN: [id number]  Encounter Provider: Bridget Alexander MD  Encounter Date: 2023   Encounter department: [address] [place] [address]      1  Chronic migraine without aura without status migrainosus, not intractable  Assessment & Plan:  Chronic migraine headaches  No change in pattern, frequency or severity  Patient will benefit from evaluation by neurology and establishing preventative regimen for her symptoms going forward  She remains on sumatriptan PRN while nursing  I will contact our clinical pharmacist to double check appropriateness of sumatriptan while breast-feeding  I advised patient to combine sumatriptan with ibuprofen 600 mg for faster headache   Orders:  -     SUMAtriptan (IMITREX) 100 mg tablet; Take 1 tablet once a day with the onset of migraine  -     Ambulatory referral to Neurology; Future    2  Anxiety with depression  Assessment & Plan:  Good control on Lexapro    Orders:  -     escitalopram (LEXAPRO) 20 mg tablet; Take 1 tablet (20 mg total) by mouth daily               Subjective     Patient presents for follow-up of migraine headaches  She just delivered her second child in 2022  No migraines while she was pregnant, patient is nursing and migraines have recurred, they come in clusters  Patient has been using Fioricet and sumatriptan that was prescribed by OB/GYN but would like to switch her abortive migraine management back to primary care physician  Reportedly, OB/GYN and pediatrician cleared her for use of sumatriptan while nursing  Headaches are of the same pattern, no significant change in frequency or severity, they might occur as cluster of few headaches interval but might not be present for a few weeks afterwards  Patient has been feeling generally well  Symptoms of anxiety are well controlled on Lexapro      Headache - Recurrent or Known Dx Migraines  Associated symptoms include headaches  Medication Refill  Associated symptoms include headaches  Review of Systems   Constitutional: Negative  HENT: Negative  Respiratory: Negative  Cardiovascular: Negative  Gastrointestinal: Negative  Genitourinary: Negative  Neurological: Positive for headaches  Past Medical History:   Diagnosis Date    Abnormal Pap smear of cervix     Anal fissure     Anxiety     Constipation     Depression     Headache     migranes    IBS (irritable bowel syndrome)     Migraine     Psoriasis     Varicella      Past Surgical History:   Procedure Laterality Date    COLPOSCOPY      GYNECOLOGIC CRYOSURGERY      [place] COLONOSCOPY FLX DX W/COLLJ SPEC WHEN PFRMD N/A 8/24/2016    Procedure: EGD AND COLONOSCOPY;  Surgeon: Jesse Hill MD;  Location: [facility]; Service: Gastroenterology    WISDOM TOOTH EXTRACTION      all four     Family History   Problem Relation Age of Onset    Anxiety disorder Mother     Uterine cancer Mother 46    No Known Problems Father     No Known Problems Brother     Hypertension Maternal Grandmother     Mental illness Maternal Grandmother     Scleroderma Paternal Grandmother     Cancer Paternal Grandfather         multiple mylenoma     Mental illness Paternal Grandfather     Sjogren's syndrome Paternal Grandfather      Social History     Socioeconomic History    Marital status: /Civil Union     Spouse name: King Slice Number of children: None    Years of education: None    Highest education level:  Bachelor's degree (e g , BA, AB, BS)   Occupational History    Occupation: RN   Tobacco Use    Smoking status: Never    Smokeless tobacco: Never   Vaping Use    Vaping Use: Never used   Substance and Sexual Activity    Alcohol use: Not Currently     Comment: socially, not while pregnant    Drug use: Never    Sexual activity: Yes     Partners: Male     Birth control/protection: None   Other Topics Concern    None   Social History "Narrative    Always uses seatbelts    Caffeine use     Social Determinants of Health     Financial Resource Strain: Not on file   Food Insecurity: Not on file   Transportation Needs: Not on file   Physical Activity: Not on file   Stress: Not on file   Social Connections: Not on file   Intimate Partner Violence: Not on file   Housing Stability: Not on file     Current Outpatient Medications on File Prior to Visit   Medication Sig    Azelaic Acid 15 % cream     Butalbital-APAP-Caffeine (Fioricet) -40 MG CAPS Take 1 tablet every 4 to 6 hours as needed for migraine headache    ergocalciferol (ERGOCALCIFEROL) 1 25 MG (32989 UT) capsule     Prenatal Multivit-Min-Fe-FA (PRE-HAMIDA FORMULA) TABS Take by mouth daily    Docusate Sodium (COLACE PO) Take by mouth     No Known Allergies  Immunization History   Administered Date(s) Administered    COVID-19 PFIZER VACCINE 0 3 ML IM 2021, 2021, 2021    DTaP 1992, 1993, 1993, 1994, 1998    Hep A, adult 2019, 2020    Hep B, adult 1992, 1992, 1993    HiB 1992, 1993, 1993, 1993    INFLUENZA 10/16/2011, 10/05/2018, 10/02/2019, 2021    Influenza, injectable, quadrivalent, preservative free 0 5 mL 10/08/2020, 2022    Influenza, seasonal, injectable 10/16/2011    MMR 1993, 1998    Meningococcal MCV4P 2020    OPV 1992, 1993, 1994, 1998    Tdap 2015, 2021, 2022    Typhoid, Unspecified 2020    Varicella 1994    Yellow Fever 2019       Objective     /72 (BP Location: Left arm, Patient Position: Sitting, Cuff Size: Standard)   Pulse 84   Temp 97 6 °F (36 4 °C) (Temporal)   Resp 16   Ht 5' 5\" (1 651 m)   Wt 52 5 kg (115 lb 12 8 oz)   LMP  (LMP Unknown)   SpO2 98%   Breastfeeding Yes   BMI 19 27 kg/m²     Physical Exam  Vitals and nursing note reviewed   " Constitutional:       Appearance: Normal appearance  Neurological:      General: No focal deficit present  Mental Status: She is alert and oriented to person, place, and time     Psychiatric:         Mood and Affect: Mood normal          Behavior: Behavior normal        Marlene Fine MD

## 2023-04-26 NOTE — Clinical Note
Question about appropriateness of sumatriptan use for abortive migraine therapy while nursing  Reportedly patient was cleared by her OB/GYN    Thank you

## 2023-04-30 NOTE — ASSESSMENT & PLAN NOTE
· Chronic migraine headaches  · No change in pattern, frequency or severity  · Patient will benefit from evaluation by neurology and establishing preventative regimen for her symptoms going forward  She remains on sumatriptan PRN while nursing  · I will contact our clinical pharmacist to double check appropriateness of sumatriptan while breast-feeding  · I advised patient to combine sumatriptan with ibuprofen 600 mg for faster headache

## 2023-05-01 ENCOUNTER — TELEPHONE (OUTPATIENT)
Dept: FAMILY MEDICINE CLINIC | Facility: CLINIC | Age: 33
End: 2023-05-01

## 2023-05-01 DIAGNOSIS — G43.709 CHRONIC MIGRAINE WITHOUT AURA WITHOUT STATUS MIGRAINOSUS, NOT INTRACTABLE: Primary | ICD-10-CM

## 2023-05-02 NOTE — TELEPHONE ENCOUNTER
[facility] Flynn Gould, PharmD, BCPS, BCACP     Communication with patient: chart review     Reason for documentation: PCP consult    Recommendations:     Interventions: Recommend provider please consider the following, if in agreeance    · Per ACOG, sumatriptan may be considered safe for use in pregnancy  Patient does not have history of hypertension or cardiac disease  Continue as prescribed by OB     Findings:     A pregnancy registry was sponsored by by the  of sumatriptan from [address] [place] [address] through 2012  The registry found no increase in the risk of major birth defects in pregnancies with first trimester exposure to sumatriptan (TVIT: [id number])  A meta-analysis of triptans, most evaluating sumatriptan, also did not find triptan exposure in pregnancy to increase the risk for major congenital malformations, low birth weight, or birth <37 weeks' gestation (FGRY: [id number])       Pharmacist Tracking Tool:     Reason For Outreach: Embedded Pharmacist  Demographics:  Intervention Method: Chart Review  Type of Intervention: New  Topics Addressed: Women's Health  Pharmacologic Interventions: Prevent or Manage LISA  Non-Pharmacologic Interventions: Medication/Device education  Time:  Direct Patient Care: 0 mins  Care Coordination: 20 mins  Recommendation Recipient: Provider  Outcome: Accepted

## 2023-06-20 ENCOUNTER — ANNUAL EXAM (OUTPATIENT)
Dept: OBGYN CLINIC | Facility: CLINIC | Age: 33
End: 2023-06-20
Payer: COMMERCIAL

## 2023-06-20 VITALS
HEIGHT: 65 IN | WEIGHT: 116 LBS | DIASTOLIC BLOOD PRESSURE: 60 MMHG | BODY MASS INDEX: 19.33 KG/M2 | SYSTOLIC BLOOD PRESSURE: 100 MMHG

## 2023-06-20 DIAGNOSIS — Z01.419 WOMEN'S ANNUAL ROUTINE GYNECOLOGICAL EXAMINATION: Primary | ICD-10-CM

## 2023-06-20 PROCEDURE — S0612 ANNUAL GYNECOLOGICAL EXAMINA: HCPCS | Performed by: OBSTETRICS & GYNECOLOGY

## 2023-06-20 NOTE — PROGRESS NOTES
ASSESSMENT & PLAN:   Diagnoses and all orders for this visit:    Women's annual routine gynecological examination          The following were reviewed in today's visit: ASCCP guidelines, Gardisil vaccination, STD testing breast self exam, family planning choices, exercise and healthy diet  Patient to return to office in yearly for annual exam      All questions have been answered to her satisfaction  CC:  Annual Gynecologic Examination  Chief Complaint   Patient presents with   • Gynecologic Exam     Pt is here for an annual exam; pap up to date  She is doing well, no concerns  Last pap 06/15/2022 neg        HPI: Holden Lees is a 29 y o  [id number] who presents for annual gynecologic examination  She has the following concerns:  Feeling well  Still breast feeding  No period yet, condoms for contraception      Health Maintenance:    Exercise: daily  Breast exams/breast awareness: yes  Diet: well balanced diet      Past Medical History:   Diagnosis Date   • Abnormal Pap smear of cervix    • Anal fissure    • Anxiety    • Constipation    • Depression    • Headache     migranes   • IBS (irritable bowel syndrome)    • Migraine    • Psoriasis    • Varicella        Past Surgical History:   Procedure Laterality Date   • COLPOSCOPY     • GYNECOLOGIC CRYOSURGERY     • WY COLONOSCOPY FLX DX W/COLLJ SPEC WHEN PFRMD N/A 8/24/2016    Procedure: EGD AND COLONOSCOPY;  Surgeon: Helen Orantes MD;  Location: [facility]; Service: Gastroenterology   • WISDOM TOOTH EXTRACTION      all four       Past OB/Gyn History:   No LMP recorded  (Menstrual status: Amenorrheic other)  Last Pap: 2022 : no abnormalities  History of abnormal Pap smear: yes  HPV vaccine completed: no    Patient is currently sexually active     STD testing: no  Current contraception: condoms      Family History  Family History   Problem Relation Age of Onset   • Anxiety disorder Mother    • Uterine cancer Mother 46   • No Known Problems Father    • No Known Problems Brother    • Hypertension Maternal Grandmother    • Mental illness Maternal Grandmother    • Scleroderma Paternal Grandmother    • Cancer Paternal Grandfather         multiple mylenoma    • Mental illness Paternal Grandfather    • Sjogren's syndrome Paternal Grandfather        Family history of uterine or ovarian cancer: yes  Family history of breast cancer: no  Family history of colon cancer: no    Social History:  Social History     Socioeconomic History   • Marital status: /Civil Union     Spouse name: Rell Charles   • Number of children: Not on file   • Years of education: Not on file   • Highest education level:  Bachelor's degree (e g , BA, AB, BS)   Occupational History   • Occupation: RN   Tobacco Use   • Smoking status: Never   • Smokeless tobacco: Never   Vaping Use   • Vaping Use: Never used   Substance and Sexual Activity   • Alcohol use: Not Currently     Comment: socially, not while pregnant   • Drug use: Never   • Sexual activity: Yes     Partners: Male     Birth control/protection: None   Other Topics Concern   • Not on file   Social History Narrative    Always uses seatbelts    Caffeine use     Social Determinants of Health     Financial Resource Strain: Not on file   Food Insecurity: Not on file   Transportation Needs: Not on file   Physical Activity: Not on file   Stress: Not on file   Social Connections: Not on file   Intimate Partner Violence: Not on file   Housing Stability: Not on file     Domestic violence screen: negative    Allergies:  No Known Allergies    Medications:    Current Outpatient Medications:   •  Azelaic Acid 15 % cream, , Disp: , Rfl:   •  escitalopram (LEXAPRO) 20 mg tablet, Take 1 tablet (20 mg total) by mouth daily, Disp: 90 tablet, Rfl: 3  •  Prenatal Multivit-Min-Fe-FA (PRE-HAMIDA FORMULA) TABS, Take by mouth daily, Disp: , Rfl:   •  SUMAtriptan (IMITREX) 100 mg tablet, Take 1 tablet once a day with the onset of migraine, Disp: 12 tablet, Rfl: "2    Review of Systems:  Review of Systems   Constitutional: Negative  HENT: Negative  Respiratory: Negative  Cardiovascular: Negative  Gastrointestinal: Negative  Genitourinary: Negative  Neurological: Negative  Psychiatric/Behavioral: Negative  Physical Exam:  /60   Ht 5' 5\" (1 651 m)   Wt 52 6 kg (116 lb)   Breastfeeding Yes   BMI 19 30 kg/m²    Physical Exam  Constitutional:       Appearance: Normal appearance  Genitourinary:      Bladder and urethral meatus normal       No lesions in the vagina  Right Labia: No rash, tenderness, lesions, skin changes or Bartholin's cyst      Left Labia: No tenderness, lesions, skin changes, Bartholin's cyst or rash  No vaginal erythema, tenderness or bleeding  Right Adnexa: not tender, not full and no mass present  Left Adnexa: not tender, not full and no mass present  Cervix is parous  No cervical motion tenderness, discharge, lesion or polyp  Uterus is not enlarged, fixed or tender  No uterine mass detected  Urethral meatus caruncle not present  No urethral tenderness or mass present  Breasts:     Right: No swelling, bleeding, inverted nipple, mass, nipple discharge, skin change or tenderness  Left: No swelling, bleeding, inverted nipple, mass, nipple discharge, skin change or tenderness  HENT:      Head: Normocephalic and atraumatic  Eyes:      Extraocular Movements: Extraocular movements intact  Conjunctiva/sclera: Conjunctivae normal       Pupils: Pupils are equal, round, and reactive to light  Cardiovascular:      Rate and Rhythm: Normal rate and regular rhythm  Heart sounds: Normal heart sounds  No murmur heard  Pulmonary:      Effort: Pulmonary effort is normal  No respiratory distress  Breath sounds: Normal breath sounds  No wheezing or rales  Abdominal:      General: There is no distension  Palpations: Abdomen is soft  Tenderness:  There is " no abdominal tenderness  There is no guarding  Neurological:      General: No focal deficit present  Mental Status: She is alert and oriented to person, place, and time  Skin:     General: Skin is warm and dry  Psychiatric:         Mood and Affect: Mood normal          Behavior: Behavior normal    Vitals and nursing note reviewed

## 2023-08-01 ENCOUNTER — TELEPHONE (OUTPATIENT)
Dept: NEUROLOGY | Facility: CLINIC | Age: 33
End: 2023-08-01

## 2023-08-02 ENCOUNTER — CONSULT (OUTPATIENT)
Dept: NEUROLOGY | Facility: CLINIC | Age: 33
End: 2023-08-02
Payer: COMMERCIAL

## 2023-08-02 VITALS
HEART RATE: 75 BPM | WEIGHT: 121.8 LBS | DIASTOLIC BLOOD PRESSURE: 76 MMHG | TEMPERATURE: 98.3 F | SYSTOLIC BLOOD PRESSURE: 102 MMHG | HEIGHT: 65 IN | BODY MASS INDEX: 20.29 KG/M2 | OXYGEN SATURATION: 99 %

## 2023-08-02 DIAGNOSIS — G43.009 MIGRAINE WITHOUT AURA AND WITHOUT STATUS MIGRAINOSUS, NOT INTRACTABLE: Primary | ICD-10-CM

## 2023-08-02 DIAGNOSIS — F32.A ANXIETY AND DEPRESSION: ICD-10-CM

## 2023-08-02 DIAGNOSIS — F41.9 ANXIETY AND DEPRESSION: ICD-10-CM

## 2023-08-02 PROCEDURE — 99244 OFF/OP CNSLTJ NEW/EST MOD 40: CPT | Performed by: STUDENT IN AN ORGANIZED HEALTH CARE EDUCATION/TRAINING PROGRAM

## 2023-08-02 RX ORDER — SUMATRIPTAN 100 MG/1
100 TABLET, FILM COATED ORAL ONCE AS NEEDED
Qty: 12 TABLET | Refills: 6 | Status: SHIPPED | OUTPATIENT
Start: 2023-08-02

## 2023-08-02 NOTE — PATIENT INSTRUCTIONS
Headache Calendar  Please maintain a headache calendar  Consider using phone applications such as Migraine Juan or [place] Migraine Tracker    Headache/migraine treatment:   Acute medications (for immediate treatment of a headache): It is ok to take ibuprofen, acetaminophen or naproxen (Advil, Tylenol,  Aleve, Excedrin) if they help your headaches you should limit these to No more than 2-3 times a week to avoid medication overuse/rebound headaches. For your more moderate to severe migraines take this medication early  Imitrex (Sumatriptan) 100mg tabs - take one at the onset of headache. May repeat one time after 2 hours if pain has not resolved. (Max 2 a day and 10 a month)     Prescription preventive medications for headaches/migraines   (to take every day to help prevent headaches - not to take at the time of headache):  Qulipta - 60mg daily    *Typically these types of medications take time until you see the benefit, although some may see improvement in days, often it may take weeks, especially if the medication is being titrated up to a beneficial level. Please contact us if there are any concerns or questions regarding the medication. Lifestyle Recommendations:  [x] SLEEP - Maintain a regular sleep schedule: Adults need at least 7-8 hours of uninterrupted a night. Maintain good sleep hygiene:  Going to bed and waking up at consistent times, avoiding excessive daytime naps, avoiding caffeinated beverages in the evening, avoid excessive stimulation in the evening and generally using bed primarily for sleeping. One hour before bedtime would recommend turning lights down lower, decreasing your activity (may read quietly, listen to music at a low volume). When you get into bed, should eliminate all technology (no texting, emailing, playing with your phone, iPad or tablet in bed). [x] HYDRATION - Maintain good hydration.   Drink  2L of fluid a day (4 typical small water bottles)  [x] DIET - Maintain good nutrition. In particular don't skip meals and try and eat healthy balanced meals regularly. [x] TRIGGERS - Look for other triggers and avoid them: Limit caffeine to 1-2 cups a day or less. Avoid dietary triggers that you have noticed bring on your headaches (this could include aged cheese, peanuts, MSG, aspartame and nitrates). [x] EXERCISE - physical exercise as we all know is good for you in many ways, and not only is good for your heart, but also is beneficial for your mental health, cognitive health and  chronic pain/headaches. I would encourage at the least 5 days of physical exercise weekly for at least 30 minutes. Education and Follow-up  [x] Please call with any questions or concerns. Of course if any new concerning symptoms go to the emergency department.   [x] Follow up in 6 months

## 2023-08-02 NOTE — PROGRESS NOTES
Review of Systems   Constitutional: Negative for appetite change, fatigue and fever. HENT: Negative. Negative for hearing loss, tinnitus, trouble swallowing and voice change. Eyes: Negative. Negative for photophobia, pain and visual disturbance. Respiratory: Negative. Negative for shortness of breath. Cardiovascular: Negative. Negative for palpitations. Gastrointestinal: Positive for nausea. Negative for vomiting. Endocrine: Negative. Negative for cold intolerance. Genitourinary: Negative. Negative for dysuria, frequency and urgency. Musculoskeletal: Negative for back pain, gait problem, myalgias and neck pain. Skin: Negative. Negative for rash. Allergic/Immunologic: Negative. Neurological: Positive for dizziness and headaches. Negative for tremors, seizures, syncope, facial asymmetry, speech difficulty, weakness, light-headedness and numbness. Hematological: Negative. Does not bruise/bleed easily. Psychiatric/Behavioral: Negative. Negative for confusion, hallucinations and sleep disturbance. All other systems reviewed and are negative.

## 2023-08-02 NOTE — PROGRESS NOTES
[facility] Consult  PATIENT:  Priti Yuen  MRN:  [id number]  :  1990  DATE OF SERVICE:  2023  REFERRED BY: Yanna Laurent MD  PMD: Yanna Laurent MD    Assessment/Plan:     Priti Yuen is a delightful 29 y.o. female with a past medical history that includes migraines, anxiety, depression, IBS referred here for evaluation of headache. Initial evaluation 2023     Ms. Girma Rodarte reports a history of migraines since she was about 27years old. She was previously treated by neurology, but has not been seen in the last 1.5 years. Of note, she was also pregnant last year which limited treatment options. We discussed a variety of potential options at today's visit, but she was interested in trying [place] for prevention. From an abortive standpoint, sumatriptan tends to work very well for her so I will have her continue with that. She is considering having another child in the future, so I have asked her to keep me updated with regards to this as I would ideally like to take her off of the [facility] before that. I have asked her to keep me updated going forward and let me know if she has any issues or concerns. Migraine without aura and without status migrainosus, not intractable  -     SUMAtriptan (IMITREX) 100 mg tablet; Take 1 tablet (100 mg total) by mouth once as needed for migraine Okay to repeat in 2 hours. No more than 2 in 24 hours. -     Atogepant 60 MG TABS; Take 60 mg by mouth in the morning    Anxiety and depression      Workup:  - Neurologic assessment reveals unremarkable neurological exam.  - With no new or concerning symptoms, no red flags and an unremarkable neurologic exam, there is no specific indication for further evaluation with MRI brain. However, this could be obtained at any time if indicated.     Preventative:  - we discussed headache hygiene and lifestyle factors that may improve headaches  - Qulipta 60mg  - Currently on through other providers: Lexapro  - Past/ failed/contraindicated: Celexa, Prozac, Paxil, Zoloft, Topamax (side effects), avoid anti-HTN and BB due to baseline vitals, avoid Aimovig due to constipation  - future options: Memantine, CGRP med, botox    Acute:  - discussed not taking over-the-counter or prescription pain medications more than 3 days per week to prevent medication overuse/rebound headache  - Sumatriptan 100mg  - Currently on through other providers: None  - Past/ failed/contraindicated: None  - future options:  Triptan, prochlorperazine, Toradol IM or p.o., could consider trial of 5 days of Depakote 500 mg nightly or dexamethasone 2 mg daily for prolonged migraine, [place] nurtec  Patient instructions   Headache Calendar  Please maintain a headache calendar  Consider using phone applications such as [employer] or [place] [facility]    Headache/migraine treatment:   Acute medications (for immediate treatment of a headache): It is ok to take ibuprofen, acetaminophen or naproxen (Advil, Tylenol,  Aleve, Excedrin) if they help your headaches you should limit these to No more than 2-3 times a week to avoid medication overuse/rebound headaches. For your more moderate to severe migraines take this medication early  Imitrex (Sumatriptan) 100mg tabs - take one at the onset of headache. May repeat one time after 2 hours if pain has not resolved. (Max 2 a day and 10 a month)     Prescription preventive medications for headaches/migraines   (to take every day to help prevent headaches - not to take at the time of headache):  Qulipta - 60mg daily    *Typically these types of medications take time until you see the benefit, although some may see improvement in days, often it may take weeks, especially if the medication is being titrated up to a beneficial level. Please contact us if there are any concerns or questions regarding the medication.      Lifestyle Recommendations:  [x] SLEEP - Maintain a regular sleep schedule: Adults need at least 7-8 hours of uninterrupted a night. Maintain good sleep hygiene:  Going to bed and waking up at consistent times, avoiding excessive daytime naps, avoiding caffeinated beverages in the evening, avoid excessive stimulation in the evening and generally using bed primarily for sleeping. One hour before bedtime would recommend turning lights down lower, decreasing your activity (may read quietly, listen to music at a low volume). When you get into bed, should eliminate all technology (no texting, emailing, playing with your phone, iPad or tablet in bed). [x] HYDRATION - Maintain good hydration. Drink  2L of fluid a day (4 typical small water bottles)  [x] DIET - Maintain good nutrition. In particular don't skip meals and try and eat healthy balanced meals regularly. [x] TRIGGERS - Look for other triggers and avoid them: Limit caffeine to 1-2 cups a day or less. Avoid dietary triggers that you have noticed bring on your headaches (this could include aged cheese, peanuts, MSG, aspartame and nitrates). [x] EXERCISE - physical exercise as we all know is good for you in many ways, and not only is good for your heart, but also is beneficial for your mental health, cognitive health and  chronic pain/headaches. I would encourage at the least 5 days of physical exercise weekly for at least 30 minutes. Education and Follow-up  [x] Please call with any questions or concerns. Of course if any new concerning symptoms go to the emergency department. [x] Follow up in 6 months  CC: We had the pleasure of evaluating Steffi Cochran in neurological consultation today. Steffi Cochran is a   right handed female who presents today for evaluation of headaches. History obtained from patient as well as available medical record review.   History of Present Illness:   Current medical illnesses  or past medical history include migraines, anxiety, depression, IBS    Pertinent history:  -Per referral, patient has been dealing with migraines for the past 8 years with associated dizziness, nausea, photophobia, and phonophobia    Headaches started at what age? 27years old  How often do the headaches occur?   - as of 8/2/2023: 14/30 (of those, about 10 can be severe migraines)  What time of the day do the headaches start? Late afternoon and evening  How long do the headaches last? 3 days if severe enough  Are you ever headache free? Yes    Aura? without aura     Where is your headache located and pain quality? Right or left frontal with radiation into the jaw; stabbing  What is the intensity of pain? Worst 10/10, Average: 8/10  Associated symptoms:   [x] Nausea       [x] Vomiting (rare)   [x] Stiff or sore neck   [x] Problems with concentration  [x] Photophobia     [x]Phonophobia      [x] Osmophobia (perfumes)  [x] Prefer quiet, dark room  [x] Light-headed or dizzy       Things that make the headache worse? No specific movements    Headache triggers:  Alcohol, high sugar foods, bright light    Have you seen someone else for headaches or pain? Yes, neurology (last seen about 1.5 years ago)  Have you had trigger point injection performed and how often? No  Have you had Botox injection performed and how often? No   Have you had epidural injections or transforaminal injections performed? No  Are you current pregnant or planning on getting pregnant? No. Not on birth control  Have you ever had any Brain imaging? no    Last eye exam: few years ago    What medications do you take or have you taken for your headaches?    ABORTIVE:    OTC medications: None  Prescription: Sumatriptan (30-60 min with 100% relief)    Past/ failed/contraindicated:  OTC medications: Tylenol, Excedrin, Advil  Prescription: None    PREVENTIVE:   Lexapro    Past/ failed/contraindicated:  Celexa, Prozac, Paxil, Zoloft, Topamax (side effects), avoid anti-HTN and BB due to baseline vitals      LIFESTYLE  Sleep   - averages: about 6 hours per night  Problems falling asleep?:   Yes  Problems staying asleep?:  No    Physical activity: Not much at the moment    Water: about 64oz per day  Caffeine: 1 cup of coffee per day    Mood:   History of anxiety and depression  - Managed by PCP    The following portions of the patient's history were reviewed and updated as appropriate: allergies, current medications, past family history, past medical history, past social history, past surgical history and problem list.    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  Education: Masters  Lives with  and kids    Illicit Drugs: denies  Alcohol/tobacco: Denies tobacco use, alcohol intake: social drinker    Past Medical History:     Past Medical History:   Diagnosis Date   • Abnormal Pap smear of cervix    • Anal fissure    • Anxiety    • Constipation    • Depression    • Headache     migranes   • IBS (irritable bowel syndrome)    • Migraine    • Psoriasis    • Varicella        Patient Active Problem List   Diagnosis   • Chronic migraine without aura, not intractable   • Vitamin D deficiency   • Myalgia   • Anxiety with depression       Medications:      Current Outpatient Medications   Medication Sig Dispense Refill   • Azelaic Acid 15 % cream      • escitalopram (LEXAPRO) 20 mg tablet Take 1 tablet (20 mg total) by mouth daily 90 tablet 3   • SUMAtriptan (IMITREX) 100 mg tablet Take 1 tablet once a day with the onset of migraine 12 tablet 2   • Prenatal Multivit-Min-Fe-FA (PRE-HAMIDA FORMULA) TABS Take by mouth daily (Patient not taking: Reported on 2023)       No current facility-administered medications for this visit.         Allergies:    No Known Allergies    Family History:     Family History   Problem Relation Age of Onset   • Anxiety disorder Mother    • Uterine cancer Mother 46   • No Known Problems Father    • No Known Problems Brother    • Hypertension Maternal Grandmother    • Mental illness Maternal Grandmother    • Scleroderma Paternal Grandmother    • Cancer Paternal Grandfather         multiple mylenoma    • Mental illness Paternal Grandfather    • Sjogren's syndrome Paternal Grandfather        Social History:       Social History     Socioeconomic History   • Marital status: /Civil Union     Spouse name: David Acosta   • Number of children: Not on file   • Years of education: Not on file   • Highest education level:  Bachelor's degree (e.g., BA, AB, BS)   Occupational History   • Occupation: RN   Tobacco Use   • Smoking status: Never   • Smokeless tobacco: Never   Vaping Use   • Vaping Use: Never used   Substance and Sexual Activity   • Alcohol use: Not Currently     Comment: socially, not while pregnant   • Drug use: Never   • Sexual activity: Yes     Partners: Male     Birth control/protection: None   Other Topics Concern   • Not on file   Social History Narrative    Always uses seatbelts    Caffeine use     Social Determinants of Health     Financial Resource Strain: Not on file   Food Insecurity: Not on file   Transportation Needs: Not on file   Physical Activity: Not on file   Stress: Not on file   Social Connections: Not on file   Intimate Partner Violence: Not on file   Housing Stability: Not on file         Objective:   Physical Exam:                                                               Vitals:            Constitutional:  /76 (BP Location: Left arm, Patient Position: Sitting, Cuff Size: Adult)   Pulse 75   Temp 98.3 °F (36.8 °C) (Temporal)   Ht 5' 5" (1.651 m)   Wt 55.2 kg (121 lb 12.8 oz)   SpO2 99%   BMI 20.27 kg/m²   BP Readings from Last 3 Encounters:   08/02/23 102/76   06/20/23 100/60   04/26/23 110/72     Pulse Readings from Last 3 Encounters:   08/02/23 75   04/26/23 84   11/29/22 76         Well developed, well nourished, well groomed. No dysmorphic features. HEENT:  Normocephalic atraumatic. Oropharynx is clear and moist. No oral mucosal lesions. Chest:  Respirations regular and unlabored. Cardiovascular:  Distal extremities warm without palpable edema or tenderness, no observed significant swelling. Musculoskeletal:  (see below under neurologic exam for evaluation of motor function and gait)   Skin:  warm and dry, not diaphoretic. No apparent birthmarks or stigmata of neurocutaneous disease. Psychiatric:  Normal behavior and appropriate affect       Neurological Examination:     Mental status/cognitive function:   Orientated to time, place and person. Recent and remote memory intact. Attention span and concentration as well as fund of knowledge are appropriate for age. Normal language and spontaneous speech. Cranial Nerves:  II-visual fields full. Fundi poorly visualized due to pupillary constriction  III, IV, VI-Pupils were equal, round, and reactive to light and accomodation. Extraocular movements were full and conjugate without nystagmus. Conjugate gaze, normal smooth pursuits, normal saccades   V-facial sensation symmetric. VII-facial expression symmetric, intact forehead wrinkle, strong eye closure, symmetric smile    VIII-hearing grossly intact bilaterally   IX, X-palate elevation symmetric, no dysarthria. XI-shoulder shrug strength intact    XII-tongue protrusion midline. Motor Exam: symmetric bulk and tone throughout, no pronator drift. Power/strength 5/5 bilateral upper and lower extremities, no atrophy, fasciculations or abnormal movements noted. Sensory: grossly intact light touch in all extremities. Reflexes: brachioradialis 2+, biceps 2+, knee 2+, ankle 2+ bilaterally. No ankle clonus  Coordination: Finger nose finger intact bilaterally, no apparent dysmetria, ataxia or tremor noted  Gait: steady casual and tandem gait.      Pertinent lab results: None     Pertinent Imaging: None  Review of Systems:   Constitutional: Negative for appetite change, fatigue and fever. HENT: Negative. Negative for hearing loss, tinnitus, trouble swallowing and voice change. Eyes: Negative. Negative for photophobia, pain and visual disturbance. Respiratory: Negative. Negative for shortness of breath. Cardiovascular: Negative. Negative for palpitations. Gastrointestinal: Positive for nausea. Negative for vomiting. Endocrine: Negative. Negative for cold intolerance. Genitourinary: Negative. Negative for dysuria, frequency and urgency. Musculoskeletal: Negative for back pain, gait problem, myalgias and neck pain. Skin: Negative. Negative for rash. Allergic/Immunologic: Negative. Neurological: Positive for dizziness and headaches. Negative for tremors, seizures, syncope, facial asymmetry, speech difficulty, weakness, light-headedness and numbness. Hematological: Negative. Does not bruise/bleed easily. Psychiatric/Behavioral: Negative. Negative for confusion, hallucinations and sleep disturbance. All other systems reviewed and are negative. I have spent 35 minutes with the patient today in which greater than 50% of this time was spent in counseling/coordination of care regarding Prognosis, Risks and benefits of tx options, Patient and family education, Impressions, Documenting in the medical record and Obtaining or reviewing history  . I also spent 10 minutes non face to face for this patient the same day.      Activity Minutes   Precharting/reviewing 5   Patient care/counseling 35   Postcharting/care coordination 5       Author:  Ilya Peña DO 8/2/2023 12:55 PM

## 2023-08-10 ENCOUNTER — TELEPHONE (OUTPATIENT)
Dept: NEUROLOGY | Facility: CLINIC | Age: 33
End: 2023-08-10

## 2023-08-10 NOTE — TELEPHONE ENCOUNTER
Received fax from [facility]. Tatiana IVERSON initiated on [facility]    Awaiting determination

## 2023-08-15 NOTE — TELEPHONE ENCOUNTER
PA approved through 8/13/24    Called [facility] and left a detailed message on their answering machine letting them know of the approval. Cb if any questions.

## 2023-10-13 DIAGNOSIS — G43.009 MIGRAINE WITHOUT AURA AND WITHOUT STATUS MIGRAINOSUS, NOT INTRACTABLE: ICD-10-CM

## 2023-10-15 RX ORDER — ATOGEPANT 60 MG/1
TABLET ORAL
Qty: 30 TABLET | Refills: 6 | Status: SHIPPED | OUTPATIENT
Start: 2023-10-15

## 2023-10-23 ENCOUNTER — PATIENT MESSAGE (OUTPATIENT)
Dept: NEUROLOGY | Facility: CLINIC | Age: 33
End: 2023-10-23

## 2023-10-24 ENCOUNTER — TELEPHONE (OUTPATIENT)
Dept: NEUROLOGY | Facility: CLINIC | Age: 33
End: 2023-10-24

## 2023-10-24 NOTE — TELEPHONE ENCOUNTER
----- Message from Vikki Gomes RN sent at 10/23/2023  5:26 PM EDT -----  Regarding: FW: Prior authorization   Contact: [phone]    ----- Message -----  From: Darlene Marvin  Sent: 10/23/2023   4:32 PM EDT  To: [facility] [place] [facility]  Subject: Prior authorization                              Just checking in on the status of the prior authorization for my Beatrizanastasia Goldberg. Suzie Jimenez been out of it for over a week now and would like to resume as soon as possible. Thank you!

## 2023-10-24 NOTE — TELEPHONE ENCOUNTER
received vm from 10/23 at 2:22pm-  Hi, this is Cindy Garcia, date of birth, [birth year]. I am calling about my Fransisco Puckett, my insurance had changed at the beginning of the month of October and the prior auth  still has not gone through. I got a message through my chart last week from you nohelia saying that it did go through and I've been in contact with the pharmacy multiple times, but they're still saying that the insurance is blocking the payment for it. So I was just wondering if you nohelia had any further information on that. I have been out of my medication now for about a week, so I'm just trying to get it as soon as possible. If you can give me a call back at [phone].  Thank you.  -------------------------------------------------  Already addressed

## 2023-10-24 NOTE — TELEPHONE ENCOUNTER
PA request located in fax folder.     Looks like pt may have new insurance    [place] completed on [facility]  Key: [id number] scripts form  Received message-  Please contact the members plan for prior authorization request     New key started-highmark form  Key: [id number]    [place] message sent to pt

## 2023-11-14 ENCOUNTER — OFFICE VISIT (OUTPATIENT)
Dept: FAMILY MEDICINE CLINIC | Facility: CLINIC | Age: 33
End: 2023-11-14
Payer: COMMERCIAL

## 2023-11-14 VITALS
DIASTOLIC BLOOD PRESSURE: 70 MMHG | HEART RATE: 93 BPM | BODY MASS INDEX: 20.83 KG/M2 | WEIGHT: 125 LBS | OXYGEN SATURATION: 97 % | TEMPERATURE: 98.4 F | SYSTOLIC BLOOD PRESSURE: 108 MMHG | HEIGHT: 65 IN | RESPIRATION RATE: 16 BRPM

## 2023-11-14 DIAGNOSIS — Z23 ENCOUNTER FOR IMMUNIZATION: ICD-10-CM

## 2023-11-14 DIAGNOSIS — J01.90 ACUTE NON-RECURRENT SINUSITIS, UNSPECIFIED LOCATION: Primary | ICD-10-CM

## 2023-11-14 DIAGNOSIS — F41.8 ANXIETY WITH DEPRESSION: ICD-10-CM

## 2023-11-14 PROCEDURE — 99213 OFFICE O/P EST LOW 20 MIN: CPT | Performed by: FAMILY MEDICINE

## 2023-11-14 PROCEDURE — 90471 IMMUNIZATION ADMIN: CPT

## 2023-11-14 PROCEDURE — 90686 IIV4 VACC NO PRSV 0.5 ML IM: CPT

## 2023-11-14 RX ORDER — AMOXICILLIN 875 MG/1
875 TABLET, COATED ORAL 2 TIMES DAILY
Qty: 20 TABLET | Refills: 0 | Status: SHIPPED | OUTPATIENT
Start: 2023-11-14 | End: 2023-11-24

## 2023-11-14 RX ORDER — FLUTICASONE PROPIONATE 50 MCG
2 SPRAY, SUSPENSION (ML) NASAL DAILY
Qty: 16 G | Refills: 1 | Status: SHIPPED | OUTPATIENT
Start: 2023-11-14

## 2023-11-14 RX ORDER — MULTIVITAMIN
1 CAPSULE ORAL DAILY
COMMUNITY

## 2023-11-14 NOTE — PROGRESS NOTES
Name: Mohan Barba      : 1990      MRN: [id number]  Encounter Provider: Steff Ace MD  Encounter Date: 2023   Encounter department: [address] [place] [address]     1. Acute non-recurrent sinusitis, unspecified location  -     amoxicillin (AMOXIL) 875 mg tablet; Take 1 tablet (875 mg total) by mouth 2 (two) times a day for 10 days  -     fluticasone (FLONASE) 50 mcg/act nasal spray; 2 sprays into each nostril daily    2. Encounter for immunization  -     influenza vaccine, quadrivalent, 0.5 mL, preservative-free, for adult and pediatric patients 6 mos+ (AFLURIA, FLUARIX, FLULAVAL, FLUZONE)    3. Anxiety with depression  Assessment & Plan:  Doing well on Lexapro 20 mg daily         Patient presents for evaluation of URI symptoms within past 4 to 5 weeks. Lung exam is clear. She appears congested with erythematous oropharynx and postnasal drip. I suspect her symptoms are related to sinusitis. Patient reports some improvement within past few days. She will start daily antihistamine (Zyrtec or Allegra) along with Flonase. Her symptoms continue to improve-there would be no indication for antibiotic therapy. If her cough will be recurring within next few days-she will start amoxicillin for 10 days. Patient is nontoxic and afebrile. Influenza vaccination was administered today. Subjective     Cold s/o x 5 weeks   Negative COVID test with onset of symptoms   Cough is productive and patient is complaining mild bilateral rib cage pain with cough -feels somewhat better today   No SOB or wheezing, but feels lightheaded at times after coughing spells   No ST or earache   No fever  Used Robitussin DM at night- helped     Daily Rx Lexapro and Margert Perches, doing well on this regimen. Patient prem under care of neurology for treatment of migraines.   Anxiety symptoms have been well controlled    Patient's younger child is turning 3ear-old, she is no longer breast-feeding        Review of Systems   Constitutional: Negative. Negative for fever. HENT:  Positive for congestion and postnasal drip. Respiratory:  Positive for cough. Negative for chest tightness, shortness of breath and wheezing. Cardiovascular: Negative. Gastrointestinal: Negative. Past Medical History:   Diagnosis Date   • Abnormal Pap smear of cervix    • Anal fissure    • Anxiety    • Constipation    • Depression    • Headache     migranes   • Headache(784.0) 2014   • IBS (irritable bowel syndrome)    • Migraine    • Psoriasis    • Varicella      Past Surgical History:   Procedure Laterality Date   • COLPOSCOPY     • EYE SURGERY  2018    LASIK   • GYNECOLOGIC CRYOSURGERY     • [place] COLONOSCOPY FLX DX W/COLLJ SPEC WHEN PFRMD N/A 08/24/2016    Procedure: EGD AND COLONOSCOPY;  Surgeon: Sotero Pryor MD;  Location: [facility];   Service: Gastroenterology   • WISDOM TOOTH EXTRACTION      all four     Family History   Problem Relation Age of Onset   • Anxiety disorder Mother    • Uterine cancer Mother 46   • Mental illness Mother         Anxiety   • Cancer Mother         Uterine cancer   • No Known Problems Father    • No Known Problems Brother    • Hypertension Maternal Grandmother    • Mental illness Maternal Grandmother    • Hyperlipidemia Maternal Grandmother    • Thyroid disease Maternal Grandmother    • Arthritis Maternal Grandmother    • Autoimmune disease Maternal Grandmother         Hashimotos thyroiditis   • Anxiety disorder Maternal Grandmother    • Scleroderma Paternal Grandmother    • Autoimmune disease Paternal Grandmother         Scleraderma   • Cancer Paternal Grandfather         multiple mylenoma    • Mental illness Paternal Grandfather    • Sjogren's syndrome Paternal Grandfather    • Hyperlipidemia Paternal Grandfather    • Thrombosis Paternal Grandfather         DVT   • Autoimmune disease Paternal Grandfather         Sjrogrena     Social History     Socioeconomic History   • Marital status: /Civil Union     Spouse name: Tegan Cabrera   • Number of children: None   • Years of education: None   • Highest education level: Bachelor's degree (e.g., BA, AB, BS)   Occupational History   • Occupation: RN   Tobacco Use   • Smoking status: Never   • Smokeless tobacco: Never   Vaping Use   • Vaping Use: Never used   Substance and Sexual Activity   • Alcohol use: Yes     Alcohol/week: 0.0 - 1.0 standard drinks of alcohol     Comment: socially, not while pregnant   • Drug use: No   • Sexual activity: Yes     Partners: Male     Birth control/protection: Condom Male   Other Topics Concern   • None   Social History Narrative    Always uses seatbelts    Caffeine use     Social Determinants of Health     Financial Resource Strain: Not on file   Food Insecurity: Not on file   Transportation Needs: Not on file   Physical Activity: Not on file   Stress: Not on file   Social Connections: Not on file   Intimate Partner Violence: Not on file   Housing Stability: Not on file     Current Outpatient Medications on File Prior to Visit   Medication Sig   • Atogepant (Qulipta) 60 MG TABS TAKE 60 MG BY MOUTH IN THE MORNING   • escitalopram (LEXAPRO) 20 mg tablet Take 1 tablet (20 mg total) by mouth daily   • Multiple Vitamin (multivitamin) capsule Take 1 capsule by mouth daily   • SUMAtriptan (IMITREX) 100 mg tablet Take 1 tablet (100 mg total) by mouth once as needed for migraine Okay to repeat in 2 hours. No more than 2 in 24 hours.      No Known Allergies  Immunization History   Administered Date(s) Administered   • COVID-19 PFIZER VACCINE 0.3 ML IM 04/06/2021, 04/27/2021, 11/18/2021   • DTaP 1992, 01/11/1993, 03/11/1993, 03/28/1994, 03/26/1998   • Hep A, adult 08/03/2019, 02/03/2020   • Hep B, adult 1990, 1990, 06/14/1993   • HiB 1990, 01/11/1993, 03/11/1993, 12/17/1993   • INFLUENZA 10/16/2011, 10/05/2018, 10/02/2019, 11/18/2021   • Influenza, injectable, quadrivalent, preservative free 0.5 mL 10/08/2020, 09/27/2022, 11/14/2023   • Influenza, seasonal, injectable 10/16/2011   • MMR 12/17/1993, 03/26/1998   • Meningococcal MCV4, Unspecified 01/13/2020   • Meningococcal MCV4P 01/13/2020   • OPV 1992, 01/11/1993, 03/28/1994, 03/26/1998   • Tdap 01/01/2015, 01/04/2021, 09/27/2022   • Typhoid, Unspecified 01/22/2020   • Varicella 03/01/1994   • Yellow Fever 12/27/2019       Objective     /70 (BP Location: Left arm, Patient Position: Sitting, Cuff Size: Standard)   Pulse 93   Temp 98.4 °F (36.9 °C) (Temporal)   Resp 16   Ht 5' 5" (1.651 m)   Wt 56.7 kg (125 lb)   SpO2 97%   BMI 20.80 kg/m²     Physical Exam  Vitals and nursing note reviewed. Constitutional:       General: She is not in acute distress. Appearance: Normal appearance. She is well-developed. She is not ill-appearing. HENT:      Head: Normocephalic and atraumatic. Right Ear: Tympanic membrane normal.      Left Ear: Tympanic membrane normal.      Nose: Mucosal edema and congestion present. Mouth/Throat:      Mouth: Mucous membranes are moist.      Pharynx: Posterior oropharyngeal erythema present. Comments: Oropharyngeal erythema with postnasal drip. No exudates  Eyes:      Conjunctiva/sclera: Conjunctivae normal.   Cardiovascular:      Rate and Rhythm: Normal rate and regular rhythm. Heart sounds: Normal heart sounds. No murmur heard. Pulmonary:      Effort: Pulmonary effort is normal. No respiratory distress. Breath sounds: Normal breath sounds. No wheezing, rhonchi or rales. Musculoskeletal:      Cervical back: Neck supple. Neurological:      Mental Status: She is alert and oriented to person, place, and time. Cranial Nerves: No cranial nerve deficit. Deep Tendon Reflexes: Reflexes are normal and symmetric. Psychiatric:         Mood and Affect: Mood normal.         Behavior: Behavior normal.         Thought Content:  Thought content normal.       Joyce Brilliant, MD

## 2023-11-20 DIAGNOSIS — G43.009 MIGRAINE WITHOUT AURA AND WITHOUT STATUS MIGRAINOSUS, NOT INTRACTABLE: ICD-10-CM

## 2023-11-21 RX ORDER — ATOGEPANT 60 MG/1
1 TABLET ORAL EVERY MORNING
Qty: 30 TABLET | Refills: 6 | Status: SHIPPED | OUTPATIENT
Start: 2023-11-21

## 2023-12-06 DIAGNOSIS — J01.90 ACUTE NON-RECURRENT SINUSITIS, UNSPECIFIED LOCATION: ICD-10-CM

## 2023-12-06 RX ORDER — FLUTICASONE PROPIONATE 50 MCG
SPRAY, SUSPENSION (ML) NASAL
Qty: 48 ML | Refills: 1 | Status: SHIPPED | OUTPATIENT
Start: 2023-12-06

## 2023-12-21 NOTE — PROGRESS NOTES
36 y o   female at 13w5d (Estimated Date of Delivery: 22) for PNV  Pre-Arie Vitals    Flowsheet Row Most Recent Value   Prenatal Assessment    Fetal Heart Rate 150   Prenatal Vitals    Blood Pressure 100/58   Weight - Scale 56 7 kg (125 lb)   Urine Albumin/Glucose    Dilation/Effacement/Station    Vaginal Drainage    Edema         TWG: -0 907 kg (-2 lb)    Patient presents for prenatal H&P  Feeling well and has minimal complaints   Denies vaginal bleeding and abdominal cramping  Tolerating oral intake  OB hx reviewed  -  --  x 1, pelvis proven to 7lb 13oz  MFM US performed 22  NIPT selected for genetic screening  AFP ordered for patient  Last pap  normal  Pap collected today  GC/Chlamydia collected  Reviewed vaccinations recommended in pregnancy, namely influenza, TDAP, and COVID vaccines  Patient oriented to practice  Reviewed 5 female providers and 2 advanced practitioners all of whom participate in prenatal care  Reviewed recommendations for weight gain in pregnancy  Recommended to limit total weight gain in pregnancy to 25-35lbs  Encouraged moderate intensity exercise throughout pregnancy  Follow up in 4 weeks  [facility]  [facility] [address]  Scheduled Appointment: 01/11/2024     [facility]  [facility] [address]  Scheduled Appointment: 01/11/2024

## 2024-01-04 ENCOUNTER — APPOINTMENT (OUTPATIENT)
Dept: LAB | Facility: CLINIC | Age: 34
End: 2024-01-04
Payer: COMMERCIAL

## 2024-01-04 ENCOUNTER — OFFICE VISIT (OUTPATIENT)
Dept: FAMILY MEDICINE CLINIC | Facility: CLINIC | Age: 34
End: 2024-01-04
Payer: COMMERCIAL

## 2024-01-04 VITALS
HEIGHT: 65 IN | OXYGEN SATURATION: 98 % | TEMPERATURE: 97.8 F | WEIGHT: 125.6 LBS | HEART RATE: 82 BPM | BODY MASS INDEX: 20.93 KG/M2 | SYSTOLIC BLOOD PRESSURE: 110 MMHG | DIASTOLIC BLOOD PRESSURE: 60 MMHG | RESPIRATION RATE: 16 BRPM

## 2024-01-04 DIAGNOSIS — G43.709 CHRONIC MIGRAINE WITHOUT AURA WITHOUT STATUS MIGRAINOSUS, NOT INTRACTABLE: ICD-10-CM

## 2024-01-04 DIAGNOSIS — F41.8 ANXIETY WITH DEPRESSION: ICD-10-CM

## 2024-01-04 DIAGNOSIS — G62.9 NEUROPATHY: Primary | ICD-10-CM

## 2024-01-04 DIAGNOSIS — G25.81 RLS (RESTLESS LEGS SYNDROME): ICD-10-CM

## 2024-01-04 DIAGNOSIS — G62.9 NEUROPATHY: ICD-10-CM

## 2024-01-04 LAB
25(OH)D3 SERPL-MCNC: 23.9 NG/ML (ref 30–100)
ALBUMIN SERPL BCP-MCNC: 5 G/DL (ref 3.5–5)
ALP SERPL-CCNC: 62 U/L (ref 34–104)
ALT SERPL W P-5'-P-CCNC: 11 U/L (ref 7–52)
ANION GAP SERPL CALCULATED.3IONS-SCNC: 11 MMOL/L
AST SERPL W P-5'-P-CCNC: 22 U/L (ref 13–39)
BASOPHILS # BLD AUTO: 0.05 THOUSANDS/ÂΜL (ref 0–0.1)
BASOPHILS NFR BLD AUTO: 1 % (ref 0–1)
BILIRUB SERPL-MCNC: 0.66 MG/DL (ref 0.2–1)
BUN SERPL-MCNC: 11 MG/DL (ref 5–25)
CALCIUM SERPL-MCNC: 10.2 MG/DL (ref 8.4–10.2)
CHLORIDE SERPL-SCNC: 101 MMOL/L (ref 96–108)
CO2 SERPL-SCNC: 28 MMOL/L (ref 21–32)
CREAT SERPL-MCNC: 0.67 MG/DL (ref 0.6–1.3)
EOSINOPHIL # BLD AUTO: 0.14 THOUSAND/ÂΜL (ref 0–0.61)
EOSINOPHIL NFR BLD AUTO: 3 % (ref 0–6)
ERYTHROCYTE [DISTWIDTH] IN BLOOD BY AUTOMATED COUNT: 12.6 % (ref 11.6–15.1)
EST. AVERAGE GLUCOSE BLD GHB EST-MCNC: 105 MG/DL
FERRITIN SERPL-MCNC: 9 NG/ML (ref 11–307)
GFR SERPL CREATININE-BSD FRML MDRD: 117 ML/MIN/1.73SQ M
GLUCOSE P FAST SERPL-MCNC: 89 MG/DL (ref 65–99)
HBA1C MFR BLD: 5.3 %
HCT VFR BLD AUTO: 41.7 % (ref 34.8–46.1)
HGB BLD-MCNC: 13.6 G/DL (ref 11.5–15.4)
IMM GRANULOCYTES # BLD AUTO: 0.01 THOUSAND/UL (ref 0–0.2)
IMM GRANULOCYTES NFR BLD AUTO: 0 % (ref 0–2)
IRON SATN MFR SERPL: 25 % (ref 15–50)
IRON SERPL-MCNC: 112 UG/DL (ref 50–212)
LYMPHOCYTES # BLD AUTO: 1.64 THOUSANDS/ÂΜL (ref 0.6–4.47)
LYMPHOCYTES NFR BLD AUTO: 29 % (ref 14–44)
MCH RBC QN AUTO: 29.8 PG (ref 26.8–34.3)
MCHC RBC AUTO-ENTMCNC: 32.6 G/DL (ref 31.4–37.4)
MCV RBC AUTO: 91 FL (ref 82–98)
MONOCYTES # BLD AUTO: 0.56 THOUSAND/ÂΜL (ref 0.17–1.22)
MONOCYTES NFR BLD AUTO: 10 % (ref 4–12)
NEUTROPHILS # BLD AUTO: 3.2 THOUSANDS/ÂΜL (ref 1.85–7.62)
NEUTS SEG NFR BLD AUTO: 57 % (ref 43–75)
NRBC BLD AUTO-RTO: 0 /100 WBCS
PLATELET # BLD AUTO: 447 THOUSANDS/UL (ref 149–390)
PMV BLD AUTO: 10.7 FL (ref 8.9–12.7)
POTASSIUM SERPL-SCNC: 4.3 MMOL/L (ref 3.5–5.3)
PROT SERPL-MCNC: 8 G/DL (ref 6.4–8.4)
RBC # BLD AUTO: 4.56 MILLION/UL (ref 3.81–5.12)
SODIUM SERPL-SCNC: 140 MMOL/L (ref 135–147)
T4 FREE SERPL-MCNC: 0.8 NG/DL (ref 0.61–1.12)
TIBC SERPL-MCNC: 455 UG/DL (ref 250–450)
TSH SERPL DL<=0.05 MIU/L-ACNC: 2.31 UIU/ML (ref 0.45–4.5)
UIBC SERPL-MCNC: 343 UG/DL (ref 155–355)
VIT B12 SERPL-MCNC: 610 PG/ML (ref 180–914)
WBC # BLD AUTO: 5.6 THOUSAND/UL (ref 4.31–10.16)

## 2024-01-04 PROCEDURE — 83540 ASSAY OF IRON: CPT

## 2024-01-04 PROCEDURE — 82728 ASSAY OF FERRITIN: CPT

## 2024-01-04 PROCEDURE — 83550 IRON BINDING TEST: CPT

## 2024-01-04 PROCEDURE — 84439 ASSAY OF FREE THYROXINE: CPT

## 2024-01-04 PROCEDURE — 83036 HEMOGLOBIN GLYCOSYLATED A1C: CPT

## 2024-01-04 PROCEDURE — 85025 COMPLETE CBC W/AUTO DIFF WBC: CPT

## 2024-01-04 PROCEDURE — 82607 VITAMIN B-12: CPT

## 2024-01-04 PROCEDURE — 80053 COMPREHEN METABOLIC PANEL: CPT

## 2024-01-04 PROCEDURE — 36415 COLL VENOUS BLD VENIPUNCTURE: CPT

## 2024-01-04 PROCEDURE — 82306 VITAMIN D 25 HYDROXY: CPT

## 2024-01-04 PROCEDURE — 84443 ASSAY THYROID STIM HORMONE: CPT

## 2024-01-04 PROCEDURE — 99214 OFFICE O/P EST MOD 30 MIN: CPT | Performed by: FAMILY MEDICINE

## 2024-01-04 NOTE — PROGRESS NOTES
Name: Alondra Blanton      : 1990      MRN: [id number]  Encounter Provider: Kerri Gaffney MD  Encounter Date: 2024   Encounter department: [facility]    Assessment & Plan     1. Neuropathy  Assessment & Plan:  Peripheral neuropathy in glove and sock distribution.  Rule out vitamin B12 deficiency, check hemoglobin A1c.  She reports burning sensation of hands and feet occurring at night.  Plan of treatment based on lab results.    Orders:  -     CBC and differential; Future  -     Comprehensive metabolic panel; Future  -     TSH, 3rd generation; Future  -     T4, free; Future  -     Vitamin B12; Future  -     Vitamin D 25 hydroxy; Future  -     Iron Panel (Includes Ferritin, Iron Sat%, Iron, and TIBC); Future  -     Hemoglobin A1C; Future    2. RLS (restless legs syndrome)  Assessment & Plan:  Symptoms of RLS first appeared during third trimester of pregnancy.  Involuntary leg movements affecting restful sleep, no daytime symptoms.  Proceed with labs.  Rule out iron deficiency versus vitamin deficiency.  We will determine plan of treatment based on lab results.      3. Anxiety with depression  Assessment & Plan:  Symptoms are well-controlled on Lexapro 20 mg daily      4. Chronic migraine without aura without status migrainosus, not intractable  Assessment & Plan:  Patient is under the care of [facility] neurology.  Continue Qulipta and PRN sumatriptan         Patient Instructions   Vitamin B complex  Magnesium tablets 250 mg each, take 2 tablets once a day    Subjective     Insomnia  x 6 months related to RLS symptoms.  Patient reports difficulty falling asleep  due to constant involuntary leg movement.  She also reports symptoms of neuropathy described as burning of her hands and feet.  She has not tried any medications.  Patient reports that she experienced symptoms of restless legs during third trimester of pregnancy.  Subsequently they have improved and then recurred  again.  Patient has tried Unisom which has actually worsened involuntary leg movement.  She denies any  daytime symptoms.  Patient tries to call her hands and feet in the cold water to alleviate her discomfort.  Menses are regular, patient follows healthy and balanced diet.No Fhx or personal h/o .          Review of Systems   Constitutional: Negative.    HENT: Negative.     Respiratory: Negative.     Cardiovascular: Negative.    Gastrointestinal: Negative.    Genitourinary: Negative.    Neurological:         Per HPI   Psychiatric/Behavioral:  Positive for sleep disturbance.        Past Medical History:   Diagnosis Date   • Abnormal Pap smear of cervix    • Anal fissure    • Anxiety    • Constipation    • Depression    • Headache     migranes   • Headache(784.0) 2014   • IBS (irritable bowel syndrome)    • Migraine    • Psoriasis    • Varicella      Past Surgical History:   Procedure Laterality Date   • COLPOSCOPY     • EYE SURGERY  2018    LASIK   • GYNECOLOGIC CRYOSURGERY     • [place] COLONOSCOPY FLX DX W/COLLJ SPEC WHEN PFRMD N/A 08/24/2016    Procedure: EGD AND COLONOSCOPY;  Surgeon: Prabhakar Delgado MD;  Location: [facility];  Service: Gastroenterology   • WISDOM TOOTH EXTRACTION      all four     Family History   Problem Relation Age of Onset   • Anxiety disorder Mother    • Uterine cancer Mother 51   • Mental illness Mother         Anxiety   • Cancer Mother         Uterine cancer   • No Known Problems Father    • No Known Problems Brother    • Hypertension Maternal Grandmother    • Mental illness Maternal Grandmother    • Hyperlipidemia Maternal Grandmother    • Thyroid disease Maternal Grandmother    • Arthritis Maternal Grandmother    • Autoimmune disease Maternal Grandmother         Hashimotos thyroiditis   • Anxiety disorder Maternal Grandmother    • Scleroderma Paternal Grandmother    • Autoimmune disease Paternal Grandmother         Scleraderma   • Cancer Paternal Grandfather         multiple mylenoma    •  Mental illness Paternal Grandfather    • Sjogren's syndrome Paternal Grandfather    • Hyperlipidemia Paternal Grandfather    • Thrombosis Paternal Grandfather         DVT   • Autoimmune disease Paternal Grandfather         Sjrogrena     Social History     Socioeconomic History   • Marital status: /Civil Union     Spouse name: Venkata Blanton   • Number of children: None   • Years of education: None   • Highest education level: Bachelor's degree (e.g., BA, AB, BS)   Occupational History   • Occupation: RN   Tobacco Use   • Smoking status: Never   • Smokeless tobacco: Never   Vaping Use   • Vaping status: Never Used   Substance and Sexual Activity   • Alcohol use: Yes     Alcohol/week: 0.0 - 1.0 standard drinks of alcohol     Comment: socially, not while pregnant   • Drug use: No   • Sexual activity: Yes     Partners: Male     Birth control/protection: Condom Male   Other Topics Concern   • None   Social History Narrative    Always uses seatbelts    Caffeine use     Social Determinants of Health     Financial Resource Strain: Not on file   Food Insecurity: Not on file   Transportation Needs: Not on file   Physical Activity: Not on file   Stress: Not on file   Social Connections: Not on file   Intimate Partner Violence: Not on file   Housing Stability: Not on file     Current Outpatient Medications on File Prior to Visit   Medication Sig   • escitalopram (LEXAPRO) 20 mg tablet Take 1 tablet (20 mg total) by mouth daily   • fluticasone (FLONASE) 50 mcg/act nasal spray SPRAY 2 SPRAYS INTO EACH NOSTRIL EVERY DAY   • Qulipta 60 MG TABS TAKE 1 TABLET BY MOUTH EVERY MORNING   • SUMAtriptan (IMITREX) 100 mg tablet Take 1 tablet (100 mg total) by mouth once as needed for migraine Okay to repeat in 2 hours. No more than 2 in 24 hours.   • Multiple Vitamin (multivitamin) capsule Take 1 capsule by mouth daily (Patient not taking: Reported on 1/4/2024)     No Known Allergies  Immunization History   Administered Date(s)  "Administered   • COVID-19 PFIZER VACCINE 0.3 ML IM 04/06/2021, 04/27/2021, 11/18/2021   • DTaP 1992, 01/11/1993, 03/11/1993, 03/28/1994, 03/26/1998   • Hep A, adult 08/03/2019, 02/03/2020   • Hep B, adult 1990, 1990, 06/14/1993   • HiB 1992, 01/11/1993, 03/11/1993, 12/17/1993   • INFLUENZA 10/16/2011, 10/05/2018, 10/02/2019, 11/18/2021   • Influenza, injectable, quadrivalent, preservative free 0.5 mL 10/08/2020, 09/27/2022, 11/14/2023   • Influenza, seasonal, injectable 10/16/2011   • MMR 12/17/1993, 03/26/1998   • Meningococcal MCV4, Unspecified 01/13/2020   • Meningococcal MCV4P 01/13/2020   • OPV 1992, 01/11/1993, 03/28/1994, 03/26/1998   • Tdap 01/01/2015, 01/04/2021, 09/27/2022   • Typhoid, Unspecified 01/22/2020   • Varicella 03/01/1994   • Yellow Fever 12/27/2019       Objective     /60 (BP Location: Left arm, Patient Position: Sitting, Cuff Size: Standard)   Pulse 82   Temp 97.8 °F (36.6 °C) (Temporal)   Resp 16   Ht 5' 5\" (1.651 m)   Wt 57 kg (125 lb 9.6 oz)   LMP 01/01/2024 (Approximate)   SpO2 98%   BMI 20.90 kg/m²     Physical Exam  Vitals and nursing note reviewed.   Constitutional:       General: She is not in acute distress.     Appearance: Normal appearance. She is not ill-appearing.   HENT:      Head: Normocephalic and atraumatic.   Musculoskeletal:      Right lower leg: No edema.      Left lower leg: No edema.   Skin:     General: Skin is warm.   Neurological:      General: No focal deficit present.      Mental Status: She is alert and oriented to person, place, and time.   Psychiatric:         Mood and Affect: Mood normal.         Behavior: Behavior normal.         Thought Content: Thought content normal.       Kerri Gaffney MD    "

## 2024-01-06 PROBLEM — G62.9 NEUROPATHY: Status: ACTIVE | Noted: 2024-01-06

## 2024-01-06 PROBLEM — G25.81 RLS (RESTLESS LEGS SYNDROME): Status: ACTIVE | Noted: 2024-01-06

## 2024-01-07 NOTE — ASSESSMENT & PLAN NOTE
Patient is under the care of [facility]'s neurology.  Continue Qulipta and PRN sumatriptan

## 2024-01-07 NOTE — ASSESSMENT & PLAN NOTE
Symptoms of RLS first appeared during third trimester of pregnancy.  Involuntary leg movements affecting restful sleep, no daytime symptoms.  Proceed with labs.  Rule out iron deficiency versus vitamin deficiency.  We will determine plan of treatment based on lab results.

## 2024-01-07 NOTE — ASSESSMENT & PLAN NOTE
Peripheral neuropathy in glove and sock distribution.  Rule out vitamin B12 deficiency, check hemoglobin A1c.  She reports burning sensation of hands and feet occurring at night.  Plan of treatment based on lab results.

## 2024-01-10 ENCOUNTER — TELEPHONE (OUTPATIENT)
Dept: FAMILY MEDICINE CLINIC | Facility: CLINIC | Age: 34
End: 2024-01-10

## 2024-01-10 DIAGNOSIS — R79.0 LOW FERRITIN: Primary | ICD-10-CM

## 2024-01-10 RX ORDER — FERROUS SULFATE 324(65)MG
324 TABLET, DELAYED RELEASE (ENTERIC COATED) ORAL DAILY
Qty: 90 TABLET | Refills: 1 | Status: SHIPPED | OUTPATIENT
Start: 2024-01-10

## 2024-01-10 NOTE — TELEPHONE ENCOUNTER
Please call patient,I sent her My Chart message regarding recent blood work.Please kindly ask to review.    Thank you

## 2024-01-26 DIAGNOSIS — R79.0 LOW FERRITIN: Primary | ICD-10-CM

## 2024-01-30 ENCOUNTER — OFFICE VISIT (OUTPATIENT)
Dept: FAMILY MEDICINE CLINIC | Facility: CLINIC | Age: 34
End: 2024-01-30
Payer: COMMERCIAL

## 2024-01-30 VITALS
HEART RATE: 91 BPM | RESPIRATION RATE: 16 BRPM | HEIGHT: 65 IN | SYSTOLIC BLOOD PRESSURE: 90 MMHG | WEIGHT: 129 LBS | BODY MASS INDEX: 21.49 KG/M2 | OXYGEN SATURATION: 99 % | TEMPERATURE: 98 F | DIASTOLIC BLOOD PRESSURE: 60 MMHG

## 2024-01-30 DIAGNOSIS — J02.9 PHARYNGITIS, UNSPECIFIED ETIOLOGY: Primary | ICD-10-CM

## 2024-01-30 LAB
S PYO AG THROAT QL: NEGATIVE
SARS-COV-2 AG UPPER RESP QL IA: NEGATIVE
SL AMB POCT RAPID FLU A: NEGATIVE
SL AMB POCT RAPID FLU B: NEGATIVE
VALID CONTROL: NORMAL

## 2024-01-30 PROCEDURE — 87811 SARS-COV-2 COVID19 W/OPTIC: CPT | Performed by: NURSE PRACTITIONER

## 2024-01-30 PROCEDURE — 87070 CULTURE OTHR SPECIMN AEROBIC: CPT | Performed by: NURSE PRACTITIONER

## 2024-01-30 PROCEDURE — 99213 OFFICE O/P EST LOW 20 MIN: CPT | Performed by: NURSE PRACTITIONER

## 2024-01-30 PROCEDURE — 87880 STREP A ASSAY W/OPTIC: CPT | Performed by: NURSE PRACTITIONER

## 2024-01-30 PROCEDURE — 87804 INFLUENZA ASSAY W/OPTIC: CPT | Performed by: NURSE PRACTITIONER

## 2024-01-30 PROCEDURE — 87147 CULTURE TYPE IMMUNOLOGIC: CPT | Performed by: NURSE PRACTITIONER

## 2024-01-30 NOTE — PROGRESS NOTES
"   FAMILY PRACTICE OFFICE VISIT       NAME: Alondra Blanton  AGE: 33 y.o. SEX: female       : 1990        MRN: [id number]    Assessment and Plan   1. Pharyngitis, unspecified etiology  -     POCT Rapid Covid Ag  -     POCT rapid flu A and B  -     POCT rapid ANTIGEN strepA  -     Throat culture       Negative rapid COVID-19, Flu, and strep.   Symptoms are concerning for strep, will send throat culture.   I have also asked her to contact me if her child's strep test is positive.   At this time will continue supportive care, rest, fluids, Tylenol ibuprofen as needed. Throat lozenges as needed, warm salt water gargles.   Call for any worsening or persisting symptoms.       Chief Complaint     Chief Complaint   Patient presents with    Cold Like Symptoms     Sore throat, headache, no appetite 1 + day       History of Present Illness     Alondra Blanton is a 33 year old male presenting today for sore throat.    Sore throat, started yesterday, worse today.     Chills, aches. Nausea.  No fever .  No stuffy nose, runny nose or cough.     Her 2 children are sick, one has ear infection.   One child is waiting on throat culture.               Review of Systems   Review of Systems   Constitutional:  Positive for chills and fatigue. Negative for diaphoresis and fever.   HENT:  Positive for sore throat. Negative for congestion and postnasal drip.    Respiratory:  Negative for cough.    Gastrointestinal:  Positive for nausea. Negative for abdominal pain, diarrhea and vomiting.   Neurological:  Positive for headaches.       I have reviewed the patient's medical history in detail; there are no changes to the history as noted in the electronic medical record.    Objective     Vitals:    24 1536   BP: 90/60   Pulse: 91   Resp: 16   Temp: 98 °F (36.7 °C)   TempSrc: Temporal   SpO2: 99%   Weight: 58.5 kg (129 lb)   Height: 5' 5\" (1.651 m)     Wt Readings from Last 3 Encounters:   24 58.5 kg (129 lb)   24 57 kg (125 lb " 9.6 oz)   11/14/23 56.7 kg (125 lb)     Physical Exam  Vitals and nursing note reviewed.   Constitutional:       General: She is not in acute distress.     Appearance: Normal appearance. She is not ill-appearing.   HENT:      Head: Atraumatic.      Right Ear: Tympanic membrane normal.      Left Ear: Tympanic membrane normal.      Nose: Nose normal.      Mouth/Throat:      Mouth: Mucous membranes are moist.      Pharynx: Posterior oropharyngeal erythema present. No pharyngeal swelling, oropharyngeal exudate or uvula swelling.      Tonsils: 1+ on the right. 1+ on the left.   Cardiovascular:      Rate and Rhythm: Normal rate and regular rhythm.      Heart sounds: No murmur heard.  Pulmonary:      Effort: Pulmonary effort is normal. No respiratory distress.      Breath sounds: Normal breath sounds. No wheezing or rales.   Musculoskeletal:      Cervical back: Normal range of motion and neck supple.   Lymphadenopathy:      Cervical: No cervical adenopathy.   Neurological:      Mental Status: She is alert.   Psychiatric:         Mood and Affect: Mood normal.            ALLERGIES:  No Known Allergies    Current Medications     Current Outpatient Medications   Medication Sig Dispense Refill    escitalopram (LEXAPRO) 20 mg tablet Take 1 tablet (20 mg total) by mouth daily 90 tablet 3    ferrous sulfate 324 (65 Fe) mg Take 1 tablet (324 mg total) by mouth daily 90 tablet 1    fluticasone (FLONASE) 50 mcg/act nasal spray SPRAY 2 SPRAYS INTO EACH NOSTRIL EVERY DAY 48 mL 1    Qulipta 60 MG TABS TAKE 1 TABLET BY MOUTH EVERY MORNING 30 tablet 6    SUMAtriptan (IMITREX) 100 mg tablet Take 1 tablet (100 mg total) by mouth once as needed for migraine Okay to repeat in 2 hours. No more than 2 in 24 hours. 12 tablet 6    tretinoin (RETIN-A) 0.025 % cream PLEASE SEE ATTACHED FOR DETAILED DIRECTIONS      Multiple Vitamin (multivitamin) capsule Take 1 capsule by mouth daily (Patient not taking: Reported on 1/4/2024)       No current  facility-administered medications for this visit.         Health Maintenance     Health Maintenance   Topic Date Due    Annual Physical  09/03/2022    COVID-19 Vaccine (4 - 2023-24 season) 09/01/2023    Depression Screening  01/04/2025    Cervical Cancer Screening  06/15/2027    DTaP,Tdap,and Td Vaccines (9 - Td or Tdap) 09/27/2032    Zoster Vaccine (1 of 2) 09/16/2042    HIV Screening  Completed    Hepatitis C Screening  Completed    HIB Vaccine  Completed    IPV Vaccine  Completed    Influenza Vaccine  Completed    Pneumococcal Vaccine: Pediatrics (0 to 5 Years) and At-Risk Patients (6 to 64 Years)  Aged Out    Hepatitis A Vaccine  Aged Out    Meningococcal ACWY Vaccine  Aged Out    HPV Vaccine  Aged Out     Immunization History   Administered Date(s) Administered    COVID-19 PFIZER VACCINE 0.3 ML IM 04/06/2021, 04/27/2021, 11/18/2021    DTaP 1992, 01/11/1993, 03/11/1993, 03/28/1994, 03/26/1998    Hep A, adult 08/03/2019, 02/03/2020    Hep B, adult 1990, 1990, 06/14/1993    HiB 1990, 01/11/1993, 03/11/1993, 12/17/1993    INFLUENZA 10/16/2011, 10/05/2018, 10/02/2019, 10/08/2020, 11/18/2021, 09/27/2022, 11/14/2023    Influenza, injectable, quadrivalent, preservative free 0.5 mL 10/08/2020, 09/27/2022, 11/14/2023    Influenza, seasonal, injectable 10/16/2011    MMR 12/17/1993, 03/26/1998    Meningococcal MCV4, Unspecified 01/13/2020    Meningococcal MCV4P 01/13/2020    OPV 1992, 01/11/1993, 03/28/1994, 03/26/1998    Tdap 01/01/2015, 01/04/2021, 09/27/2022    Typhoid, Unspecified 01/22/2020    Varicella 03/01/1994    Yellow Fever 12/27/2019       PHILIP Meade

## 2024-01-31 ENCOUNTER — TELEPHONE (OUTPATIENT)
Dept: NEUROLOGY | Facility: CLINIC | Age: 34
End: 2024-01-31

## 2024-02-02 ENCOUNTER — TELEPHONE (OUTPATIENT)
Dept: FAMILY MEDICINE CLINIC | Facility: CLINIC | Age: 34
End: 2024-02-02

## 2024-02-03 LAB — BACTERIA THROAT CULT: ABNORMAL

## 2024-02-05 ENCOUNTER — OFFICE VISIT (OUTPATIENT)
Dept: NEUROLOGY | Facility: CLINIC | Age: 34
End: 2024-02-05
Payer: COMMERCIAL

## 2024-02-05 ENCOUNTER — OFFICE VISIT (OUTPATIENT)
Dept: OBGYN CLINIC | Facility: OTHER | Age: 34
End: 2024-02-05
Payer: COMMERCIAL

## 2024-02-05 ENCOUNTER — APPOINTMENT (OUTPATIENT)
Dept: RADIOLOGY | Facility: OTHER | Age: 34
End: 2024-02-05
Payer: COMMERCIAL

## 2024-02-05 VITALS
WEIGHT: 128.5 LBS | DIASTOLIC BLOOD PRESSURE: 68 MMHG | SYSTOLIC BLOOD PRESSURE: 114 MMHG | HEIGHT: 65 IN | HEART RATE: 65 BPM | OXYGEN SATURATION: 99 % | BODY MASS INDEX: 21.41 KG/M2 | TEMPERATURE: 97.3 F

## 2024-02-05 VITALS
HEIGHT: 65 IN | DIASTOLIC BLOOD PRESSURE: 71 MMHG | SYSTOLIC BLOOD PRESSURE: 107 MMHG | BODY MASS INDEX: 21.33 KG/M2 | WEIGHT: 128 LBS | HEART RATE: 80 BPM

## 2024-02-05 DIAGNOSIS — M23.92 KNEE LOCKING, LEFT: ICD-10-CM

## 2024-02-05 DIAGNOSIS — M25.562 ACUTE PAIN OF LEFT KNEE: ICD-10-CM

## 2024-02-05 DIAGNOSIS — D16.22 OSTEOCHONDROMA OF LEFT LOWER LEG: ICD-10-CM

## 2024-02-05 DIAGNOSIS — F41.9 ANXIETY AND DEPRESSION: ICD-10-CM

## 2024-02-05 DIAGNOSIS — F32.A ANXIETY AND DEPRESSION: ICD-10-CM

## 2024-02-05 DIAGNOSIS — G43.009 MIGRAINE WITHOUT AURA AND WITHOUT STATUS MIGRAINOSUS, NOT INTRACTABLE: Primary | ICD-10-CM

## 2024-02-05 DIAGNOSIS — M25.562 ACUTE PAIN OF LEFT KNEE: Primary | ICD-10-CM

## 2024-02-05 PROCEDURE — 73564 X-RAY EXAM KNEE 4 OR MORE: CPT

## 2024-02-05 PROCEDURE — 99203 OFFICE O/P NEW LOW 30 MIN: CPT | Performed by: PHYSICIAN ASSISTANT

## 2024-02-05 PROCEDURE — 99213 OFFICE O/P EST LOW 20 MIN: CPT | Performed by: STUDENT IN AN ORGANIZED HEALTH CARE EDUCATION/TRAINING PROGRAM

## 2024-02-05 RX ORDER — ATOGEPANT 60 MG/1
1 TABLET ORAL EVERY MORNING
Qty: 30 TABLET | Refills: 6 | Status: SHIPPED | OUTPATIENT
Start: 2024-02-05

## 2024-02-05 RX ORDER — ATOGEPANT 60 MG/1
1 TABLET ORAL EVERY MORNING
Qty: 30 TABLET | Refills: 6 | Status: SHIPPED | OUTPATIENT
Start: 2024-02-05 | End: 2024-02-05 | Stop reason: SDUPTHER

## 2024-02-05 NOTE — PROGRESS NOTES
Orthopaedic Surgery - Office Note  Alondra Blanton (33 y.o. female)   : 1990   MRN: [id number]  Encounter Date: 2024    Chief Complaint   Patient presents with    Left Knee - Pain         Assessment/Plan  Diagnoses and all orders for this visit:    Acute pain of left knee  -     XR knee 4+ vw left injury; Future  -     MRI knee left  wo contrast; Future  -     Ambulatory Referral to Orthopedic Surgery; Future    Osteochondroma of left lower leg-proximal medial tibial plateau  -     XR knee 4+ vw left injury; Future  -     MRI knee left  wo contrast; Future  -     Ambulatory Referral to Orthopedic Surgery; Future    Knee locking, left    The diagnosis as well as treatment options were reviewed with the patient in the office today.  At this time she would like to consider surgical excision of the endochondroma as it is causing her pain, difficulty with activities of daily living, and symptomatic mechanical locking of the knee.    I discussed with the patient I would recommend first an MRI to evaluate the endochondroma with possible soft tissue impingement.  The mechanical catching in the knee is likely related to soft tissue catching on the endochondroma which will be aided by MRI evaluation.  She will return to discuss the MRI with orthopedic surgeon.     Return for Recheck with Dr. Koo to discuss surgical excision endochondroma.        History of Present Illness  This is a new patient with left knee pain.  She reports that she had problems in the left knee in 2017 with mechanical locking and clicking at which time x-ray and MRI confirmed a endochondroma.  She reports that her symptoms slightly diminished over time but recently have returned causing significant difficulties in activities of daily living.  She reports daily instances where her medial knee will feel like it gets locked with severe pain requiring manual palpation and manipulation in the region of the endochondroma to allow the leg to fully  "extend.  She denies any new trauma.  She denies any fever or chills.  She has not noticed any swelling inside the knee.    Patient has a history of left knee medial tibial osteochondroma as seen on x-ray and MRI in 2017.  She she treated with Dr. Harper.    Review of Systems  Pertinent items are noted in HPI.  All other systems were reviewed and are negative.    Physical Exam  /71 (BP Location: Right arm, Patient Position: Sitting, Cuff Size: Standard)   Pulse 80   Ht 5' 5\" (1.651 m)   Wt 58.1 kg (128 lb)   BMI 21.30 kg/m²   Cons: Appears well.  No apparent distress.  Psych: Alert. Oriented x3.  Mood and affect normal.    Patient's left knee has visual deformity of the proximal medial tibial plateau where she has a palpable endochondroma.  There appears to be subluxing soft tissue over/under the endochondroma with deep flexion going to full extension.  There is no intra-articular effusion in the left knee.  She is nontender on the medial lateral joint line.  She has a negative medial lateral Ramone's.  She has no pain or instability with valgus and varus stressing.  There is no instability with Lachman and posterior drawer.  She has near full extension and flexes to 130 degrees.  Quad and hamstring strength are 5 out of 5.  There is no evidence of DVT or infection.  Her gait is heel-to-toe.  She has a negative straight leg raise.    X-ray performed in the office today 3 views of the left knee show no acute fractures or dislocations.  No significant degenerative changes are noted.  She does have a proximal medial tibial plateau osteochondroma which is changed slightly in appearance from previous x-ray in 2017.  There is a more prominent tip inferiorly.  X-rays were reviewed from orthopedic standpoint will await official radiologist interpretation                 Studies Reviewed  MRI LEFT KNEE     INDICATION: Left knee pain, locking.  History of osteochondroma.     COMPARISON: Left knee plain films from " 12/5/2017.     TECHNIQUE:    MR sequences were obtained of the left knee including:  Localizer, axial T2 fat sat, coronal T1/T2 fat sat, sagittal PD/T2 fat sat.  Images were acquired on a 1.5 Marlena unit.    Gadolinium was not used.     FINDINGS:     SUBCUTANEOUS TISSUES: Normal     JOINT EFFUSION: None.     BAKER'S CYST: None.     MENISCI: Intact.      CRUCIATE LIGAMENTS: Intact.     EXTENSOR APPARATUS: Intact.     COLLATERAL LIGAMENTS: Intact.     ARTICULAR SURFACES: Intact.     BONE MARROW: There is a pedunculated osteochondroma arising from the medial aspect of the proximal medial metaphysis.  The cartilage cap is thin, measuring a maximum of 3 mm, therefore there is no evidence of malignant degeneration.  The osteochondroma's   tip does abut the medial gastrocnemius muscle, but there are no adjacent inflammatory changes.     MUSCULATURE:  Intact.     IMPRESSION:     There is no evidence of intra-articular pathology such as meniscal tears to account for knee locking.     There is a pedunculated osteochondroma arising from the medial aspect of the proximal medial metaphysis, without evidence of malignant degeneration or inflammatory changes to the adjacent medial gastrocnemius.        Workstation performed: [id number]  Narrative & Impression   LEFT KNEE     INDICATION:  27-year-old female, left knee locks up     COMPARISON: None.     VIEWS:  4     IMAGES:  4     FINDINGS:     There is no acute fracture or dislocation.     There is no joint effusion.     No degenerative changes.     No lytic or blastic lesions are seen.     Soft tissues are unremarkable.     Posteromedial proximal tibial osteochondroma measuring approximately 1.5 cm x 4 cm     IMPRESSION:  Posttraumatic medial proximal tibial osteochondroma     No acute osseous abnormality.        Workstation performed: [id number]   MRI report and images, x-ray images and reports were reviewed by myself in the office today.  Previous orthopedic notes from 2017  were reviewed by myself in the office today.    Procedures  No procedures today.    Medical, Surgical, Family, and Social History  The patient's medical history, family history, and social history, were reviewed and updated as appropriate.    Past Medical History:   Diagnosis Date    Abnormal Pap smear of cervix     Anal fissure     Anxiety     Constipation     Depression     Headache     migranes    Headache(784.0) 2014    IBS (irritable bowel syndrome)     Migraine     Psoriasis     Varicella        Past Surgical History:   Procedure Laterality Date    COLPOSCOPY      EYE SURGERY  2018    LASIK    GYNECOLOGIC CRYOSURGERY      HI COLONOSCOPY FLX DX W/COLLJ SPEC WHEN PFRMD N/A 08/24/2016    Procedure: EGD AND COLONOSCOPY;  Surgeon: Prabhakar Delgado MD;  Location: [facility];  Service: Gastroenterology    WISDOM TOOTH EXTRACTION      all four       Family History   Problem Relation Age of Onset    Anxiety disorder Mother     Uterine cancer Mother 51    Mental illness Mother         Anxiety    Cancer Mother         Uterine cancer    No Known Problems Father     No Known Problems Brother     Hypertension Maternal Grandmother     Mental illness Maternal Grandmother     Hyperlipidemia Maternal Grandmother     Thyroid disease Maternal Grandmother     Arthritis Maternal Grandmother     Autoimmune disease Maternal Grandmother         Hashimotos thyroiditis    Anxiety disorder Maternal Grandmother     Scleroderma Paternal Grandmother     Autoimmune disease Paternal Grandmother         Scleraderma    Cancer Paternal Grandfather         multiple mylenoma     Mental illness Paternal Grandfather     Sjogren's syndrome Paternal Grandfather     Hyperlipidemia Paternal Grandfather     Thrombosis Paternal Grandfather         DVT    Autoimmune disease Paternal Grandfather         Sjrogrena       Social History     Occupational History    Occupation: RN   Tobacco Use    Smoking status: Never    Smokeless tobacco: Never   Vaping Use     Vaping status: Never Used   Substance and Sexual Activity    Alcohol use: Yes     Alcohol/week: 0.0 - 1.0 standard drinks of alcohol     Comment: socially, not while pregnant    Drug use: No    Sexual activity: Yes     Partners: Male     Birth control/protection: Condom Male       No Known Allergies      Current Outpatient Medications:     escitalopram (LEXAPRO) 20 mg tablet, Take 1 tablet (20 mg total) by mouth daily, Disp: 90 tablet, Rfl: 3    ferrous sulfate 324 (65 Fe) mg, Take 1 tablet (324 mg total) by mouth daily, Disp: 90 tablet, Rfl: 1    fluticasone (FLONASE) 50 mcg/act nasal spray, SPRAY 2 SPRAYS INTO EACH NOSTRIL EVERY DAY, Disp: 48 mL, Rfl: 1    Multiple Vitamin (multivitamin) capsule, Take 1 capsule by mouth daily (Patient not taking: Reported on 1/4/2024), Disp: , Rfl:     Qulipta 60 MG TABS, TAKE 1 TABLET BY MOUTH EVERY MORNING, Disp: 30 tablet, Rfl: 6    SUMAtriptan (IMITREX) 100 mg tablet, Take 1 tablet (100 mg total) by mouth once as needed for migraine Okay to repeat in 2 hours. No more than 2 in 24 hours., Disp: 12 tablet, Rfl: 6    tretinoin (RETIN-A) 0.025 % cream, PLEASE SEE ATTACHED FOR DETAILED DIRECTIONS, Disp: , Rfl:       Jass Wahl PA-C

## 2024-02-05 NOTE — PATIENT INSTRUCTIONS
Headache Calendar  Please maintain a headache calendar  Consider using phone applications such as Migraine Juan or [place] Migraine Tracker     Headache/migraine treatment:   Acute medications (for immediate treatment of a headache):   It is ok to take ibuprofen, acetaminophen or naproxen (Advil, Tylenol,  Aleve, Excedrin) if they help your headaches you should limit these to No more than 2-3 times a week to avoid medication overuse/rebound headaches.      For your more moderate to severe migraines take this medication early  Imitrex (Sumatriptan) 100mg tabs - take one at the onset of headache. May repeat one time after 2 hours if pain has not resolved.   (Max 2 a day and 10 a month)      Prescription preventive medications for headaches/migraines   (to take every day to help prevent headaches - not to take at the time of headache):  Qulipta - 60mg daily     Lifestyle Recommendations:  [x] SLEEP - Maintain a regular sleep schedule: Adults need at least 7-8 hours of uninterrupted a night. Maintain good sleep hygiene:  Going to bed and waking up at consistent times, avoiding excessive daytime naps, avoiding caffeinated beverages in the evening, avoid excessive stimulation in the evening and generally using bed primarily for sleeping.  One hour before bedtime would recommend turning lights down lower, decreasing your activity (may read quietly, listen to music at a low volume). When you get into bed, should eliminate all technology (no texting, emailing, playing with your phone, iPad or tablet in bed).  [x] HYDRATION - Maintain good hydration.  Drink  2L of fluid a day (4 typical small water bottles)  [x] DIET - Maintain good nutrition. In particular don't skip meals and try and eat healthy balanced meals regularly.  [x] TRIGGERS - Look for other triggers and avoid them: Limit caffeine to 1-2 cups a day or less. Avoid dietary triggers that you have noticed bring on your headaches (this could include aged cheese,  peanuts, MSG, aspartame and nitrates).  [x] EXERCISE - physical exercise as we all know is good for you in many ways, and not only is good for your heart, but also is beneficial for your mental health, cognitive health and  chronic pain/headaches. I would encourage at the least 5 days of physical exercise weekly for at least 30 minutes.      Education and Follow-up  [x] Please call with any questions or concerns. Of course if any new concerning symptoms go to the emergency department.  [x] Follow up in 6 months

## 2024-02-05 NOTE — PROGRESS NOTES
[facility]rology Concussion[facility]Headache [facility] Consult - Follow up   PATIENT:  Alondra Blanton  MRN:  [id number]  :  1990  DATE OF SERVICE:  2024  REFERRED BY: No ref. provider found  PMD: Kerri Gaffney MD    Assessment/Plan:   Alondra Blanton is a delightful 32 y.o. female with a past medical history that includes migraines, anxiety, depression, IBS here for f/u evaluation of headache.     At today's visit, she reports significant improvement in terms of her headaches.  She currently endorses about 2 headache days per month.  She has been tolerating Qulipta 60 mg daily without any significant issues and uses sumatriptan for abortive management when needed.  She did report that about 1 month after starting Qulipta, she noticed that her hands and feet were very warm in the evening when trying to go to bed.  She was seen by her PCP who ordered a variety of labs, all of which were normal aside from her iron panel for which she is taking iron supplements daily.  I do not believe the symptoms are related to the Qulipta.  I have asked her to continue with iron supplements for the time being to see if things improve.  If she continues to struggle going forward, she may benefit from a rheumatology evaluation.    Workup:  - Neurologic assessment reveals unremarkable neurological exam.  - With no new or concerning symptoms, no red flags and an unremarkable neurologic exam, there is no specific indication for further evaluation with MRI brain.  However, this could be obtained at any time if indicated.     Preventative:  - we discussed headache hygiene and lifestyle factors that may improve headaches  - Qulipta 60mg  - Currently on through other providers: Lexapro  - Past/ failed/contraindicated: Celexa, Prozac, Paxil, Zoloft, Topamax (side effects), avoid anti-HTN and BB due to baseline vitals, avoid Aimovig due to constipation  - future options: Memantine, CGRP med, botox     Acute:  - discussed not taking  over-the-counter or prescription pain medications more than 3 days per week to prevent medication overuse/rebound headache  - Sumatriptan 100mg  - Currently on through other providers: None  - Past/ failed/contraindicated: None  - future options:  Triptan, prochlorperazine, Toradol IM or p.o., could consider trial of 5 days of Depakote 500 mg nightly or dexamethasone 2 mg daily for prolonged migraine, ubrelvy, reyvow, nurtec  Patient instructions   Headache Calendar  Please maintain a headache calendar  Consider using phone applications such as Migraine Juan or [employer] Migraine Tracker     Headache/migraine treatment:   Acute medications (for immediate treatment of a headache):   It is ok to take ibuprofen, acetaminophen or naproxen (Advil, Tylenol,  Aleve, Excedrin) if they help your headaches you should limit these to No more than 2-3 times a week to avoid medication overuse/rebound headaches.      For your more moderate to severe migraines take this medication early  Imitrex (Sumatriptan) 100mg tabs - take one at the onset of headache. May repeat one time after 2 hours if pain has not resolved.   (Max 2 a day and 10 a month)      Prescription preventive medications for headaches/migraines   (to take every day to help prevent headaches - not to take at the time of headache):  Qulipta - 60mg daily     Lifestyle Recommendations:  [x] SLEEP - Maintain a regular sleep schedule: Adults need at least 7-8 hours of uninterrupted a night. Maintain good sleep hygiene:  Going to bed and waking up at consistent times, avoiding excessive daytime naps, avoiding caffeinated beverages in the evening, avoid excessive stimulation in the evening and generally using bed primarily for sleeping.  One hour before bedtime would recommend turning lights down lower, decreasing your activity (may read quietly, listen to music at a low volume). When you get into bed, should eliminate all technology (no texting, emailing, playing with your  phone, iPad or tablet in bed).  [x] HYDRATION - Maintain good hydration.  Drink  2L of fluid a day (4 typical small water bottles)  [x] DIET - Maintain good nutrition. In particular don't skip meals and try and eat healthy balanced meals regularly.  [x] TRIGGERS - Look for other triggers and avoid them: Limit caffeine to 1-2 cups a day or less. Avoid dietary triggers that you have noticed bring on your headaches (this could include aged cheese, peanuts, MSG, aspartame and nitrates).  [x] EXERCISE - physical exercise as we all know is good for you in many ways, and not only is good for your heart, but also is beneficial for your mental health, cognitive health and  chronic pain/headaches. I would encourage at the least 5 days of physical exercise weekly for at least 30 minutes.      Education and Follow-up  [x] Please call with any questions or concerns. Of course if any new concerning symptoms go to the emergency department.  [x] Follow up in 6 months  Subjective:   2/5/24: At today's visit, she reports overall improvement in terms of her headaches.  Currently endorses about 2 headache days per month. She is taking Qulipta daily without any significant issues. From an abortive standpoint she is tolerating Sumatriptan without any issues.     Previous History:  8/2/23: Ms. Blanton reports a history of migraines since she was about 24 years old.  She was previously treated by neurology, but has not been seen in the last 1.5 years.  Of note, she was also pregnant last year which limited treatment options.  We discussed a variety of potential options at today's visit, but she was interested in trying Qulipta for prevention.  From an abortive standpoint, sumatriptan tends to work very well for her so I will have her continue with that.  She is considering having another child in the future, so I have asked her to keep me updated with regards to this as I would ideally like to take her off of the Qulipta before that.  I have  asked her to keep me updated going forward and let me know if she has any issues or concerns.    Past Medical History:     Past Medical History:   Diagnosis Date    Abnormal Pap smear of cervix     Anal fissure     Anxiety     Constipation     Depression     Headache     migranes    Headache(784.0) 2014    IBS (irritable bowel syndrome)     Migraine     Psoriasis     Varicella        Patient Active Problem List   Diagnosis    Chronic migraine without aura, not intractable    Vitamin D deficiency    Myalgia    Anxiety with depression    Neuropathy    RLS (restless legs syndrome)    Low ferritin       Medications:      Current Outpatient Medications   Medication Sig Dispense Refill    escitalopram (LEXAPRO) 20 mg tablet Take 1 tablet (20 mg total) by mouth daily 90 tablet 3    ferrous sulfate 324 (65 Fe) mg Take 1 tablet (324 mg total) by mouth daily 90 tablet 1    fluticasone (FLONASE) 50 mcg/act nasal spray SPRAY 2 SPRAYS INTO EACH NOSTRIL EVERY DAY 48 mL 1    Multiple Vitamin (multivitamin) capsule Take 1 capsule by mouth daily      Qulipta 60 MG TABS TAKE 1 TABLET BY MOUTH EVERY MORNING 30 tablet 6    SUMAtriptan (IMITREX) 100 mg tablet Take 1 tablet (100 mg total) by mouth once as needed for migraine Okay to repeat in 2 hours. No more than 2 in 24 hours. 12 tablet 6    tretinoin (RETIN-A) 0.025 % cream PLEASE SEE ATTACHED FOR DETAILED DIRECTIONS       No current facility-administered medications for this visit.        Allergies:    No Known Allergies    Family History:     Family History   Problem Relation Age of Onset    Anxiety disorder Mother     Uterine cancer Mother 51    Mental illness Mother         Anxiety    Cancer Mother         Uterine cancer    No Known Problems Father     No Known Problems Brother     Hypertension Maternal Grandmother     Mental illness Maternal Grandmother     Hyperlipidemia Maternal Grandmother     Thyroid disease Maternal Grandmother     Arthritis Maternal Grandmother      "Autoimmune disease Maternal Grandmother         Hashimotos thyroiditis    Anxiety disorder Maternal Grandmother     Scleroderma Paternal Grandmother     Autoimmune disease Paternal Grandmother         Scleraderma    Cancer Paternal Grandfather         multiple mylenoma     Mental illness Paternal Grandfather     Sjogren's syndrome Paternal Grandfather     Hyperlipidemia Paternal Grandfather     Thrombosis Paternal Grandfather         DVT    Autoimmune disease Paternal Grandfather         Sjrogrena       Social History:     Social History     Socioeconomic History    Marital status: /Civil Union     Spouse name: Venkata Blanton    Number of children: Not on file    Years of education: Not on file    Highest education level: Bachelor's degree (e.g., BA, AB, BS)   Occupational History    Occupation: RN   Tobacco Use    Smoking status: Never    Smokeless tobacco: Never   Vaping Use    Vaping status: Never Used   Substance and Sexual Activity    Alcohol use: Yes     Alcohol/week: 0.0 - 1.0 standard drinks of alcohol     Comment: socially, not while pregnant    Drug use: No    Sexual activity: Yes     Partners: Male     Birth control/protection: Condom Male   Other Topics Concern    Not on file   Social History Narrative    Always uses seatbelts    Caffeine use     Social Determinants of Health     Financial Resource Strain: Not on file   Food Insecurity: Not on file   Transportation Needs: Not on file   Physical Activity: Not on file   Stress: Not on file   Social Connections: Not on file   Intimate Partner Violence: Not on file   Housing Stability: Not on file         Objective:   Physical Exam:                                                               Vitals:            Constitutional:  /68 (BP Location: Left arm, Patient Position: Sitting, Cuff Size: Adult)   Pulse 65   Temp (!) 97.3 °F (36.3 °C) (Temporal)   Ht 5' 5\" (1.651 m)   Wt 58.3 kg (128 lb 8 oz)   SpO2 99%   BMI 21.38 kg/m²   BP Readings " from Last 3 Encounters:   02/05/24 114/68   02/05/24 107/71   01/30/24 90/60     Pulse Readings from Last 3 Encounters:   02/05/24 65   02/05/24 80   01/30/24 91         Well developed, well nourished, well groomed. No dysmorphic features.       HEENT:  Normocephalic atraumatic. See neuro exam   Chest:  Respirations appear regular and unlabored.    Cardiovascular:  no observed significant swelling.    Musculoskeletal:  (see below under neurologic exam for evaluation of motor function and gait)   Skin:  warm and dry, not diaphoretic.    Psychiatric:  Normal behavior and appropriate affect       Neurological Examination:     Mental status/cognitive function:   Recent and remote memory intact. Attention span and concentration as well as fund of knowledge are appropriate for age. Normal language and spontaneous speech.  Cranial Nerves:  III, IV, VI-Pupils were equal, round. Extraocular movements were full and conjugate   VII-facial expression symmetric  VIII-hearing grossly intact bilaterally   Motor Exam: symmetric bulk throughout. no atrophy, fasciculations or abnormal movements noted.   Coordination:  no apparent dysmetria, ataxia or tremor noted  Gait: steady casual gait  Review of Systems:   Constitutional:  Negative for appetite change, fatigue and fever.   HENT: Negative.  Negative for hearing loss, tinnitus, trouble swallowing and voice change.    Eyes: Negative.  Negative for photophobia, pain and visual disturbance.   Respiratory: Negative.  Negative for shortness of breath.    Cardiovascular: Negative.  Negative for palpitations.   Gastrointestinal: Negative.  Negative for nausea and vomiting.   Endocrine: Negative.  Negative for cold intolerance.   Genitourinary: Negative.  Negative for dysuria, frequency and urgency.   Musculoskeletal:  Negative for back pain, gait problem, myalgias, neck pain and neck stiffness.   Skin: Negative.  Negative for rash.   Allergic/Immunologic: Negative.    Neurological:   Positive for headaches. Negative for dizziness, tremors, seizures, syncope, facial asymmetry, speech difficulty, weakness, light-headedness and numbness.   Hematological: Negative.  Does not bruise/bleed easily.   Psychiatric/Behavioral: Negative.  Negative for confusion, hallucinations and sleep disturbance.    All other systems reviewed and are negative.    I have spent 15 minutes with Patient  today in which greater than 50% of this time was spent in counseling/coordination of care regarding Prognosis, Risks and benefits of tx options, Patient and family education, Importance of tx compliance, Impressions, Documenting in the medical record, Reviewing / ordering tests, medicine, procedures  , and Obtaining or reviewing history  . I also spent 10 minutes non face to face for this patient the same day.     Activity Minutes   Precharting/reviewing 5   Patient care/counseling 15   Postcharting/care coordination 5       Author:  Jd Ramirez DO 2/5/2024 12:24 PM

## 2024-02-05 NOTE — PROGRESS NOTES
ED follow up letter sent to pt. Review of Systems   Constitutional:  Negative for appetite change, fatigue and fever.   HENT: Negative.  Negative for hearing loss, tinnitus, trouble swallowing and voice change.    Eyes: Negative.  Negative for photophobia, pain and visual disturbance.   Respiratory: Negative.  Negative for shortness of breath.    Cardiovascular: Negative.  Negative for palpitations.   Gastrointestinal: Negative.  Negative for nausea and vomiting.   Endocrine: Negative.  Negative for cold intolerance.   Genitourinary: Negative.  Negative for dysuria, frequency and urgency.   Musculoskeletal:  Negative for back pain, gait problem, myalgias, neck pain and neck stiffness.   Skin: Negative.  Negative for rash.   Allergic/Immunologic: Negative.    Neurological:  Positive for headaches. Negative for dizziness, tremors, seizures, syncope, facial asymmetry, speech difficulty, weakness, light-headedness and numbness.   Hematological: Negative.  Does not bruise/bleed easily.   Psychiatric/Behavioral: Negative.  Negative for confusion, hallucinations and sleep disturbance.    All other systems reviewed and are negative.    Since your last visit are your headaches Improved    Any change to the headache type? No    What is your current headache frequency: 2 per month    Are you taking your current medications as prescribed? yes    Do you have any side effects? no    How may days per week do you take an abortive medicine? 0/7      3

## 2024-02-10 ENCOUNTER — HOSPITAL ENCOUNTER (OUTPATIENT)
Dept: MRI IMAGING | Facility: HOSPITAL | Age: 34
Discharge: HOME | End: 2024-02-10
Payer: COMMERCIAL

## 2024-02-10 DIAGNOSIS — D16.22 OSTEOCHONDROMA OF LEFT LOWER LEG: ICD-10-CM

## 2024-02-10 DIAGNOSIS — M25.562 ACUTE PAIN OF LEFT KNEE: ICD-10-CM

## 2024-02-10 PROCEDURE — G1004 CDSM NDSC: HCPCS

## 2024-02-10 PROCEDURE — 73721 MRI JNT OF LWR EXTRE W/O DYE: CPT

## 2024-02-15 ENCOUNTER — OFFICE VISIT (OUTPATIENT)
Dept: OBGYN CLINIC | Facility: MEDICAL CENTER | Age: 34
End: 2024-02-15
Payer: COMMERCIAL

## 2024-02-15 VITALS
DIASTOLIC BLOOD PRESSURE: 64 MMHG | WEIGHT: 127 LBS | HEIGHT: 65 IN | HEART RATE: 71 BPM | SYSTOLIC BLOOD PRESSURE: 96 MMHG | BODY MASS INDEX: 21.16 KG/M2

## 2024-02-15 DIAGNOSIS — M25.562 ACUTE PAIN OF LEFT KNEE: ICD-10-CM

## 2024-02-15 DIAGNOSIS — D16.22 OSTEOCHONDROMA OF LEFT LOWER LEG: ICD-10-CM

## 2024-02-15 PROCEDURE — 99215 OFFICE O/P EST HI 40 MIN: CPT | Performed by: STUDENT IN AN ORGANIZED HEALTH CARE EDUCATION/TRAINING PROGRAM

## 2024-02-15 RX ORDER — ACETAMINOPHEN 325 MG/1
975 TABLET ORAL ONCE
OUTPATIENT
Start: 2024-02-15 | End: 2024-02-15

## 2024-02-15 RX ORDER — CHLORHEXIDINE GLUCONATE 4 G/100ML
SOLUTION TOPICAL DAILY PRN
OUTPATIENT
Start: 2024-02-15

## 2024-02-15 RX ORDER — DOCUSATE SODIUM 100 MG/1
CAPSULE, LIQUID FILLED ORAL
COMMUNITY

## 2024-02-15 RX ORDER — CEFAZOLIN SODIUM 2 G/50ML
2000 SOLUTION INTRAVENOUS ONCE
OUTPATIENT
Start: 2024-02-15 | End: 2024-02-15

## 2024-02-15 NOTE — PROGRESS NOTES
Orthopedic Surgery Office Note  Alondra Blanton (33 y.o. female)  : 1990 Encounter Date: 2/15/2024  Dr. Sergio Singer, , Orthopedic Surgeon  Orthopedic Oncology [facility] Sarcoma Surgery   Phone:[phone] Fax:[phone]    Assessment and Plan: Alondra Blanton is a 33 y.o. female with:    1.  Left medial proximal tibia osteochondroma  - We reviewed her history, physical exam and imaging findings with the patient. She has a large osteochondroma of the left proximal tibia which is likely impinging her pes anserine tendons and causing bursitis. We discussed conservative management in the form of observation vs surgical management for excision of osteochondroma.  - Patient can WBAT, no restrictions in activity  - Informed consent obtained today for Left proximal tibia osteochondroma excision    #. Comorbidity, including: migraines, vitamin D deficiency, anxiety  - continue current management     Procedure:  No procedures performed    Surgical Planning:   Surgery Signup: The patient is indicated for surgical intervention with with Excision of left proximal tibia medial osteochondroma. Risks and benefits of the treatment options and surgery were discussed in detail with the patient by Dr. Singer. The risks of surgery including infection, bleeding, injury to nerves, injury to the vessels, excess scar tissue formation, risk of failure of the procedure, the possible need for further surgery, and potential risk of loss of limb and life. Specific risks to this procedure discussed, including damage to tendons, scarring.  After weighing all the treatment options available, the patient has opted for surgical intervention and informed consent was obtained. We will schedule the patient to be seen back postoperatively.    Pre-op recommendations:   Hold anticoagulation therapy 5-7 days prior to surgery date  Hold vitamins/minerals/supplements/ NSAIDs 7 days prior to surgery to decrease bleeding risk.  Hold diabetic medications  morning of surgery.  Hold Ace/Arbs/CCB day of surgery  OK to take rest of your medications morning of surgery with small sip of water including pain medication.  NPO night before surgery at midnight  Advised to discuss this with their prescribers as well.      Follow up: 10-14 days postoperatively  __________________________________________________________________    History of Present Illness:     Alondra Blanton is a 33 y.o. female with history of left proximal tibia osteochondroma who presents for consultation at the request of Jass Wahl PA*. She states that she has had this osteochondroma since many years ago, it has been bothersome at times and she has been evaluated. Lately it has become more painful and is interfering with her ambulating/jogging and causing significant discomfort as well as locking of her knee.   Currently at home taking care of 2 toddlers.     At baseline patient gaits w/o assistance.  Denies constitutional symptoms such as fever, chills, night sweats, fatigue, weight gains/losses. Denies  chest pain/shortness of breath.  Patient Denies  personal history of cancer.    Occupation: nurse    Oncology History    No history exists.       Review of Systems:   Allergies, medications, past medical/surgical/family/social history have been reviewed.  Complete 12 system review performed and found to be negative except: except as per mentioned in HPI.    Physical Examination:         There were no vitals filed for this visit.  There is no height or weight on file to calculate BMI.    General: alert and oriented x 3; well nourished/well developed; no apparent distress.   Psychiatric: normal mood and affect  HEENT: NCAT. Head/neck - full range of motion.   Lungs: breathing comfortably; equal symmetric chest expansion.   Abdomen: soft, non-tender, non-distended.   Skin: warm; dry; no lesions, rashes, petechiae or purpura; no clubbing, no cyanosis, no edema    Extremity: Left knee   Inspection: no  edema, skin abnormalities throughout   Palpation: palpable mass at the medial proximal tibia with mobile pes tendons   Knee ROM: 0-120   Motor strength: WNL all extremities   Sensation: grossly intact to all extremities.    Pulses: 2+   Lymphatics: (no) lymphadenopathy  Gait: normal gait.    IMAGING RESULTS, All images personally review today by Dr. Singer  Study: Left knee xrays  Date: 2/5/2024  Report: I have read and agree with the radiologist report.  My impression is as follows: benign appearing pedunculated osteochondroma of the medial proximal tibia    Study: Left knee MRI  Date: 2/10/2024  Report: I have read and agree with the radiologist report.  My impression is as follows: benign appearing pedunculated osteochondroma of the medial proxximal tibia    Pertinent laboratory findings:  N/a    Pathology:   N/a    Microbiology:  Cultures: n/a    Review of referring provider's records:  Referring provider: Jass Wahl PA*  Date: 2/5/2024  Impression: The diagnosis as well as treatment options were reviewed with the patient in the office today.  At this time she would like to consider surgical excision of the endochondroma as it is causing her pain, difficulty with activities of daily living, and symptomatic mechanical locking of the knee.     I discussed with the patient I would recommend first an MRI to evaluate the endochondroma with possible soft tissue impingement.  The mechanical catching in the knee is likely related to soft tissue catching on the endochondroma which will be aided by MRI evaluation.  She will return to discuss the MRI with orthopedic surgeon.    Patient Care team:   Patient Care Team:  Kerri Gaffney MD as PCP - General (Family Medicine)  Hazel Tuazon, MD C William Riedel, DO James Boylan, MD as Endoscopist  Prasad Live MD (Maternal and Fetal Medicine)  Crispin Patel MD (Maternal and Fetal Medicine)  Narciso Vidal MD (Maternal and Fetal Medicine)  Clari Álvarez  MD Levi (Maternal and Fetal Medicine)  Maria G Otero MD (Maternal and Fetal Medicine)  Niesha Reich MD (Maternal and Fetal Medicine)  Estela Fuentes MD (Maternal and Fetal Medicine)  Sussy Verde MD (Maternal and Fetal Medicine)     Past Medical History:   Diagnosis Date    Abnormal Pap smear of cervix     Anal fissure     Anxiety     Constipation     Depression     Headache     migranes    Headache(784.0) 2014    IBS (irritable bowel syndrome)     Migraine     Psoriasis     Varicella      Past Surgical History:   Procedure Laterality Date    COLPOSCOPY      EYE SURGERY  2018    LASIK    GYNECOLOGIC CRYOSURGERY      [place] COLONOSCOPY FLX DX W/COLLJ SPEC WHEN PFRMD N/A 08/24/2016    Procedure: EGD AND COLONOSCOPY;  Surgeon: Prabhakar Delgado MD;  Location: [facility];  Service: Gastroenterology    WISDOM TOOTH EXTRACTION      all four       Current Outpatient Medications:     Atogepant (Qulipta) 60 MG TABS, Take 1 tablet by mouth every morning, Disp: 30 tablet, Rfl: 6    escitalopram (LEXAPRO) 20 mg tablet, Take 1 tablet (20 mg total) by mouth daily, Disp: 90 tablet, Rfl: 3    ferrous sulfate 324 (65 Fe) mg, Take 1 tablet (324 mg total) by mouth daily, Disp: 90 tablet, Rfl: 1    fluticasone (FLONASE) 50 mcg/act nasal spray, SPRAY 2 SPRAYS INTO EACH NOSTRIL EVERY DAY, Disp: 48 mL, Rfl: 1    Multiple Vitamin (multivitamin) capsule, Take 1 capsule by mouth daily, Disp: , Rfl:     SUMAtriptan (IMITREX) 100 mg tablet, Take 1 tablet (100 mg total) by mouth once as needed for migraine Okay to repeat in 2 hours. No more than 2 in 24 hours., Disp: 12 tablet, Rfl: 6    tretinoin (RETIN-A) 0.025 % cream, PLEASE SEE ATTACHED FOR DETAILED DIRECTIONS, Disp: , Rfl:   No Known Allergies  Family History   Problem Relation Age of Onset    Anxiety disorder Mother     Uterine cancer Mother 51    Mental illness Mother         Anxiety    Cancer Mother         Uterine cancer    No Known Problems Father     No  Known Problems Brother     Hypertension Maternal Grandmother     Mental illness Maternal Grandmother     Hyperlipidemia Maternal Grandmother     Thyroid disease Maternal Grandmother     Arthritis Maternal Grandmother     Autoimmune disease Maternal Grandmother         Hashimotos thyroiditis    Anxiety disorder Maternal Grandmother     Scleroderma Paternal Grandmother     Autoimmune disease Paternal Grandmother         Scleraderma    Cancer Paternal Grandfather         multiple mylenoma     Mental illness Paternal Grandfather     Sjogren's syndrome Paternal Grandfather     Hyperlipidemia Paternal Grandfather     Thrombosis Paternal Grandfather         DVT    Autoimmune disease Paternal Grandfather         Sjrogrena     Social History     Socioeconomic History    Marital status: /Civil Union     Spouse name: Venkata Blanton    Number of children: Not on file    Years of education: Not on file    Highest education level: Bachelor's degree (e.g., BA, AB, BS)   Occupational History    Occupation: RN   Tobacco Use    Smoking status: Never    Smokeless tobacco: Never   Vaping Use    Vaping status: Never Used   Substance and Sexual Activity    Alcohol use: Yes     Alcohol/week: 0.0 - 1.0 standard drinks of alcohol     Comment: socially, not while pregnant    Drug use: No    Sexual activity: Yes     Partners: Male     Birth control/protection: Condom Male   Other Topics Concern    Not on file   Social History Narrative    Always uses seatbelts    Caffeine use     Social Determinants of Health     Financial Resource Strain: Not on file   Food Insecurity: Not on file   Transportation Needs: Not on file   Physical Activity: Not on file   Stress: Not on file   Social Connections: Not on file   Intimate Partner Violence: Not on file   Housing Stability: Not on file       30 minutes was spent in the coordination of care, reviewing of imaging and with the patient on the date of service      Leroy Ivan MD   2/15/2024 3:12  PM     Diagnoses and all orders for this visit:    Acute pain of left knee  -     Ambulatory Referral to Orthopedic Surgery    Osteochondroma of left lower leg-proximal medial tibial plateau  -     Ambulatory Referral to Orthopedic Surgery    Other orders  -     docusate sodium (Colace) 100 mg capsule  -     Magnesium 200 MG CHEW

## 2024-04-10 ENCOUNTER — APPOINTMENT (OUTPATIENT)
Dept: LAB | Facility: CLINIC | Age: 34
End: 2024-04-10
Payer: COMMERCIAL

## 2024-04-10 DIAGNOSIS — R79.0 LOW FERRITIN: ICD-10-CM

## 2024-04-10 LAB
ERYTHROCYTE [DISTWIDTH] IN BLOOD BY AUTOMATED COUNT: 12.6 % (ref 11.6–15.1)
FERRITIN SERPL-MCNC: 9 NG/ML (ref 11–307)
HCT VFR BLD AUTO: 39.2 % (ref 34.8–46.1)
HGB BLD-MCNC: 12.6 G/DL (ref 11.5–15.4)
IRON SATN MFR SERPL: 27 % (ref 15–50)
IRON SERPL-MCNC: 112 UG/DL (ref 50–212)
MCH RBC QN AUTO: 29.6 PG (ref 26.8–34.3)
MCHC RBC AUTO-ENTMCNC: 32.1 G/DL (ref 31.4–37.4)
MCV RBC AUTO: 92 FL (ref 82–98)
PLATELET # BLD AUTO: 374 THOUSANDS/UL (ref 149–390)
PMV BLD AUTO: 11.3 FL (ref 8.9–12.7)
RBC # BLD AUTO: 4.25 MILLION/UL (ref 3.81–5.12)
TIBC SERPL-MCNC: 416 UG/DL (ref 250–450)
UIBC SERPL-MCNC: 304 UG/DL (ref 155–355)
WBC # BLD AUTO: 6.71 THOUSAND/UL (ref 4.31–10.16)

## 2024-04-10 PROCEDURE — 85027 COMPLETE CBC AUTOMATED: CPT

## 2024-04-10 PROCEDURE — 83550 IRON BINDING TEST: CPT

## 2024-04-10 PROCEDURE — 83540 ASSAY OF IRON: CPT

## 2024-04-10 PROCEDURE — 36415 COLL VENOUS BLD VENIPUNCTURE: CPT

## 2024-04-10 PROCEDURE — 82728 ASSAY OF FERRITIN: CPT

## 2024-04-18 ENCOUNTER — ANESTHESIA EVENT (OUTPATIENT)
Dept: PERIOP | Facility: HOSPITAL | Age: 34
End: 2024-04-18
Payer: COMMERCIAL

## 2024-04-18 NOTE — PRE-PROCEDURE INSTRUCTIONS
Pre-Surgery Instructions:   Medication Instructions    Atogepant (Qulipta) 60 MG TABS Take day of surgery.    docusate sodium (Colace) 100 mg capsule Uses PRN- OK to take day of surgery    escitalopram (LEXAPRO) 20 mg tablet Take day of surgery.    Magnesium 200 MG CHEW Hold day of surgery.    SUMAtriptan (IMITREX) 100 mg tablet Uses PRN- OK to take day of surgery    tretinoin (RETIN-A) 0.025 % cream Hold day of surgery.    Medication instructions for day surgery reviewed. Please use only a sip of water to take your instructed medications. Avoid all over the counter vitamins, supplements and NSAIDS for one week prior to surgery per anesthesia guidelines. Tylenol is ok to take as needed.     You will receive a call one business day prior to surgery with an arrival time and hospital directions. If your surgery is scheduled on a Monday, the hospital will be calling you on the Friday prior to your surgery. If you have not heard from anyone by 8pm, please call the hospital supervisor through the hospital  at [phone]. ([employer] [phone] or [place] [phone]).    Do not eat or drink anything after midnight the night before your surgery, including candy, mints, lifesavers, or chewing gum. Do not drink alcohol 24hrs before your surgery. Try not to smoke at least 24hrs before your surgery.       Follow the pre surgery showering instructions as listed in the “My Surgical Experience Booklet” or otherwise provided by your surgeon's office. Do not use a blade to shave the surgical area 1 week before surgery. It is okay to use a clean electric clippers up to 24 hours before surgery. Do not apply any lotions, creams, including makeup, cologne, deodorant, or perfumes after showering on the day of your surgery. Do not use dry shampoo, hair spray, hair gel, or any type of hair products.     No contact lenses, eye make-up, or artificial eyelashes. Remove nail polish, including gel polish, and any artificial, gel,  or acrylic nails if possible. Remove all jewelry including rings and body piercing jewelry.     Wear causal clothing that is easy to take on and off. Consider your type of surgery.    Keep any valuables, jewelry, piercings at home. Please bring any specially ordered equipment (sling, braces) if indicated.    Arrange for a responsible person to drive you to and from the hospital on the day of your surgery. Please confirm the visitor policy for the day of your procedure when you receive your phone call with an arrival time.     Call the surgeon's office with any new illnesses, exposures, or additional questions prior to surgery.    Please reference your “My Surgical Experience Booklet” for additional information to prepare for your upcoming surgery.

## 2024-04-22 RX ORDER — MEPERIDINE HYDROCHLORIDE 25 MG/ML
12.5 INJECTION INTRAMUSCULAR; INTRAVENOUS; SUBCUTANEOUS
Status: CANCELLED | OUTPATIENT
Start: 2024-04-22

## 2024-04-22 RX ORDER — HYDROMORPHONE HCL/PF 1 MG/ML
0.5 SYRINGE (ML) INJECTION
Status: CANCELLED | OUTPATIENT
Start: 2024-04-22

## 2024-04-22 RX ORDER — ONDANSETRON 2 MG/ML
4 INJECTION INTRAMUSCULAR; INTRAVENOUS ONCE AS NEEDED
Status: CANCELLED | OUTPATIENT
Start: 2024-04-22

## 2024-04-22 RX ORDER — FENTANYL CITRATE/PF 50 MCG/ML
25 SYRINGE (ML) INJECTION
Status: CANCELLED | OUTPATIENT
Start: 2024-04-22

## 2024-04-23 PROBLEM — R11.2 PONV (POSTOPERATIVE NAUSEA AND VOMITING): Status: ACTIVE | Noted: 2024-04-23

## 2024-04-23 PROBLEM — Z98.890 PONV (POSTOPERATIVE NAUSEA AND VOMITING): Status: ACTIVE | Noted: 2024-04-23

## 2024-04-23 NOTE — ANESTHESIA PREPROCEDURE EVALUATION
TIVA    Medical History    History Comments   Headache migranes   IBS (irritable bowel syndrome)    Constipation    Anal fissure    Abnormal Pap smear of cervix    Depression    Anxiety    Migraine    Varicella    Psoriasis    Headache(784.0)    Seasonal allergies      Procedure:  Osteochondroma- proximal tibia- REMOVAL TUMOR BONE (Left: Knee)    Relevant Problems   ANESTHESIA   (+) PONV (postoperative nausea and vomiting)      CARDIO (within normal limits)   (+) Chronic migraine without aura, not intractable      ENDO (within normal limits)      NEURO/PSYCH   (+) Anxiety with depression   (+) Chronic migraine without aura, not intractable      PULMONARY (within normal limits)        Physical Exam    Airway    Mallampati score: I  TM Distance: >3 FB  Neck ROM: full     Dental   No notable dental hx     Cardiovascular  Rate: normal    Pulmonary  Pulmonary exam normal     Other Findings  Per pt denies anything remaining that is loose or removeablepost-pubertal.      Anesthesia Plan  ASA Score- 2     Anesthesia Type- general with ASA Monitors.         Additional Monitors:     Airway Plan: ETT and LMA.    Comment: Nerve block for postop pain control.       Plan Factors-    Chart reviewed.    Patient summary reviewed.                  Induction- intravenous.    Postoperative Plan-     Informed Consent- Anesthetic plan and risks discussed with patient.

## 2024-04-24 ENCOUNTER — ANESTHESIA (OUTPATIENT)
Dept: PERIOP | Facility: HOSPITAL | Age: 34
End: 2024-04-24
Payer: COMMERCIAL

## 2024-04-24 ENCOUNTER — HOSPITAL ENCOUNTER (OUTPATIENT)
Facility: HOSPITAL | Age: 34
Setting detail: OUTPATIENT SURGERY
Discharge: HOME | End: 2024-04-24
Attending: STUDENT IN AN ORGANIZED HEALTH CARE EDUCATION/TRAINING PROGRAM | Admitting: STUDENT IN AN ORGANIZED HEALTH CARE EDUCATION/TRAINING PROGRAM
Payer: COMMERCIAL

## 2024-04-24 ENCOUNTER — APPOINTMENT (OUTPATIENT)
Dept: RADIOLOGY | Facility: HOSPITAL | Age: 34
End: 2024-04-24
Payer: COMMERCIAL

## 2024-04-24 VITALS
RESPIRATION RATE: 15 BRPM | HEIGHT: 65 IN | TEMPERATURE: 98 F | BODY MASS INDEX: 21.41 KG/M2 | OXYGEN SATURATION: 98 % | SYSTOLIC BLOOD PRESSURE: 89 MMHG | HEART RATE: 62 BPM | DIASTOLIC BLOOD PRESSURE: 51 MMHG | WEIGHT: 128.53 LBS

## 2024-04-24 DIAGNOSIS — D16.22 OSTEOCHONDROMA OF LEFT LOWER LEG: ICD-10-CM

## 2024-04-24 DIAGNOSIS — D16.22 OSTEOCHONDROMA OF LEFT LOWER LEG: Primary | ICD-10-CM

## 2024-04-24 LAB
EXT PREGNANCY TEST URINE: NEGATIVE
EXT. CONTROL: NORMAL

## 2024-04-24 PROCEDURE — NC001 PR NO CHARGE: Performed by: STUDENT IN AN ORGANIZED HEALTH CARE EDUCATION/TRAINING PROGRAM

## 2024-04-24 PROCEDURE — 27635 REMOVE LOWER LEG BONE LESION: CPT

## 2024-04-24 PROCEDURE — C9290 INJ, BUPIVACAINE LIPOSOME: HCPCS | Performed by: STUDENT IN AN ORGANIZED HEALTH CARE EDUCATION/TRAINING PROGRAM

## 2024-04-24 PROCEDURE — 88307 TISSUE EXAM BY PATHOLOGIST: CPT | Performed by: STUDENT IN AN ORGANIZED HEALTH CARE EDUCATION/TRAINING PROGRAM

## 2024-04-24 PROCEDURE — 27635 REMOVE LOWER LEG BONE LESION: CPT | Performed by: STUDENT IN AN ORGANIZED HEALTH CARE EDUCATION/TRAINING PROGRAM

## 2024-04-24 PROCEDURE — 88311 DECALCIFY TISSUE: CPT | Performed by: STUDENT IN AN ORGANIZED HEALTH CARE EDUCATION/TRAINING PROGRAM

## 2024-04-24 PROCEDURE — 81025 URINE PREGNANCY TEST: CPT | Performed by: ANESTHESIOLOGY

## 2024-04-24 RX ORDER — PROPOFOL 10 MG/ML
INJECTION, EMULSION INTRAVENOUS AS NEEDED
Status: DISCONTINUED | OUTPATIENT
Start: 2024-04-24 | End: 2024-04-24

## 2024-04-24 RX ORDER — PROMETHAZINE HYDROCHLORIDE 25 MG/ML
12.5 INJECTION, SOLUTION INTRAMUSCULAR; INTRAVENOUS
Status: DISCONTINUED | OUTPATIENT
Start: 2024-04-24 | End: 2024-04-24 | Stop reason: HOSPADM

## 2024-04-24 RX ORDER — KETOROLAC TROMETHAMINE 30 MG/ML
INJECTION, SOLUTION INTRAMUSCULAR; INTRAVENOUS AS NEEDED
Status: DISCONTINUED | OUTPATIENT
Start: 2024-04-24 | End: 2024-04-24

## 2024-04-24 RX ORDER — HYDROMORPHONE HCL/PF 1 MG/ML
0.5 SYRINGE (ML) INJECTION
Status: DISCONTINUED | OUTPATIENT
Start: 2024-04-24 | End: 2024-04-24 | Stop reason: HOSPADM

## 2024-04-24 RX ORDER — DEXAMETHASONE SODIUM PHOSPHATE 10 MG/ML
INJECTION, SOLUTION INTRAMUSCULAR; INTRAVENOUS AS NEEDED
Status: DISCONTINUED | OUTPATIENT
Start: 2024-04-24 | End: 2024-04-24 | Stop reason: HOSPADM

## 2024-04-24 RX ORDER — SENNA AND DOCUSATE SODIUM 50; 8.6 MG/1; MG/1
1 TABLET, FILM COATED ORAL DAILY
Qty: 14 TABLET | Refills: 0 | Status: SHIPPED | OUTPATIENT
Start: 2024-04-24 | End: 2024-05-08

## 2024-04-24 RX ORDER — DEXAMETHASONE SODIUM PHOSPHATE 4 MG/ML
INJECTION, SOLUTION INTRA-ARTICULAR; INTRALESIONAL; INTRAMUSCULAR; INTRAVENOUS; SOFT TISSUE AS NEEDED
Status: DISCONTINUED | OUTPATIENT
Start: 2024-04-24 | End: 2024-04-24

## 2024-04-24 RX ORDER — FENTANYL CITRATE 50 UG/ML
INJECTION, SOLUTION INTRAMUSCULAR; INTRAVENOUS AS NEEDED
Status: DISCONTINUED | OUTPATIENT
Start: 2024-04-24 | End: 2024-04-24

## 2024-04-24 RX ORDER — MAGNESIUM HYDROXIDE 1200 MG/15ML
LIQUID ORAL AS NEEDED
Status: DISCONTINUED | OUTPATIENT
Start: 2024-04-24 | End: 2024-04-24 | Stop reason: HOSPADM

## 2024-04-24 RX ORDER — ACETAMINOPHEN 325 MG/1
650 TABLET ORAL EVERY 6 HOURS PRN
Status: DISCONTINUED | OUTPATIENT
Start: 2024-04-24 | End: 2024-04-24 | Stop reason: HOSPADM

## 2024-04-24 RX ORDER — ONDANSETRON 2 MG/ML
4 INJECTION INTRAMUSCULAR; INTRAVENOUS EVERY 6 HOURS PRN
Status: DISCONTINUED | OUTPATIENT
Start: 2024-04-24 | End: 2024-04-24 | Stop reason: HOSPADM

## 2024-04-24 RX ORDER — LIDOCAINE HYDROCHLORIDE 10 MG/ML
INJECTION, SOLUTION EPIDURAL; INFILTRATION; INTRACAUDAL; PERINEURAL AS NEEDED
Status: DISCONTINUED | OUTPATIENT
Start: 2024-04-24 | End: 2024-04-24

## 2024-04-24 RX ORDER — FENTANYL CITRATE/PF 50 MCG/ML
50 SYRINGE (ML) INJECTION
Status: DISCONTINUED | OUTPATIENT
Start: 2024-04-24 | End: 2024-04-24 | Stop reason: HOSPADM

## 2024-04-24 RX ORDER — ONDANSETRON 2 MG/ML
4 INJECTION INTRAMUSCULAR; INTRAVENOUS ONCE AS NEEDED
Status: DISCONTINUED | OUTPATIENT
Start: 2024-04-24 | End: 2024-04-24 | Stop reason: HOSPADM

## 2024-04-24 RX ORDER — LIDOCAINE HYDROCHLORIDE AND EPINEPHRINE 10; 10 MG/ML; UG/ML
INJECTION, SOLUTION INFILTRATION; PERINEURAL AS NEEDED
Status: DISCONTINUED | OUTPATIENT
Start: 2024-04-24 | End: 2024-04-24 | Stop reason: HOSPADM

## 2024-04-24 RX ORDER — LIDOCAINE HYDROCHLORIDE 20 MG/ML
INJECTION, SOLUTION EPIDURAL; INFILTRATION; INTRACAUDAL; PERINEURAL AS NEEDED
Status: DISCONTINUED | OUTPATIENT
Start: 2024-04-24 | End: 2024-04-24

## 2024-04-24 RX ORDER — HYDROMORPHONE HCL/PF 1 MG/ML
0.2 SYRINGE (ML) INJECTION
Status: DISCONTINUED | OUTPATIENT
Start: 2024-04-24 | End: 2024-04-24 | Stop reason: HOSPADM

## 2024-04-24 RX ORDER — CELECOXIB 100 MG/1
100 CAPSULE ORAL 2 TIMES DAILY
Qty: 28 CAPSULE | Refills: 0 | Status: SHIPPED | OUTPATIENT
Start: 2024-04-24 | End: 2024-05-08

## 2024-04-24 RX ORDER — SODIUM CHLORIDE, SODIUM LACTATE, POTASSIUM CHLORIDE, CALCIUM CHLORIDE 600; 310; 30; 20 MG/100ML; MG/100ML; MG/100ML; MG/100ML
125 INJECTION, SOLUTION INTRAVENOUS CONTINUOUS
Status: DISCONTINUED | OUTPATIENT
Start: 2024-04-24 | End: 2024-04-24 | Stop reason: HOSPADM

## 2024-04-24 RX ORDER — PROPOFOL 10 MG/ML
INJECTION, EMULSION INTRAVENOUS CONTINUOUS PRN
Status: DISCONTINUED | OUTPATIENT
Start: 2024-04-24 | End: 2024-04-24

## 2024-04-24 RX ORDER — ONDANSETRON 2 MG/ML
INJECTION INTRAMUSCULAR; INTRAVENOUS AS NEEDED
Status: DISCONTINUED | OUTPATIENT
Start: 2024-04-24 | End: 2024-04-24

## 2024-04-24 RX ORDER — ONDANSETRON 4 MG/1
4 TABLET, ORALLY DISINTEGRATING ORAL EVERY 6 HOURS PRN
Qty: 15 TABLET | Refills: 0 | Status: SHIPPED | OUTPATIENT
Start: 2024-04-24

## 2024-04-24 RX ORDER — BUPIVACAINE HYDROCHLORIDE 2.5 MG/ML
INJECTION, SOLUTION EPIDURAL; INFILTRATION; INTRACAUDAL AS NEEDED
Status: DISCONTINUED | OUTPATIENT
Start: 2024-04-24 | End: 2024-04-24

## 2024-04-24 RX ORDER — CEFAZOLIN SODIUM 2 G/50ML
2000 SOLUTION INTRAVENOUS ONCE
Status: COMPLETED | OUTPATIENT
Start: 2024-04-24 | End: 2024-04-24

## 2024-04-24 RX ORDER — ACETAMINOPHEN 10 MG/ML
1000 INJECTION, SOLUTION INTRAVENOUS ONCE
Status: COMPLETED | OUTPATIENT
Start: 2024-04-24 | End: 2024-04-24

## 2024-04-24 RX ORDER — ACETAMINOPHEN 500 MG
1000 TABLET ORAL EVERY 8 HOURS
Qty: 60 TABLET | Refills: 0 | Status: SHIPPED | OUTPATIENT
Start: 2024-04-24 | End: 2024-05-04

## 2024-04-24 RX ORDER — MIDAZOLAM HYDROCHLORIDE 2 MG/2ML
INJECTION, SOLUTION INTRAMUSCULAR; INTRAVENOUS AS NEEDED
Status: DISCONTINUED | OUTPATIENT
Start: 2024-04-24 | End: 2024-04-24

## 2024-04-24 RX ADMIN — CEFAZOLIN SODIUM 2000 MG: 2 SOLUTION INTRAVENOUS at 07:29

## 2024-04-24 RX ADMIN — BUPIVACAINE 10 ML: 13.3 INJECTION, SUSPENSION, LIPOSOMAL INFILTRATION at 07:10

## 2024-04-24 RX ADMIN — FENTANYL CITRATE 25 MCG: 50 INJECTION INTRAMUSCULAR; INTRAVENOUS at 07:01

## 2024-04-24 RX ADMIN — DEXAMETHASONE SODIUM PHOSPHATE 10 MG: 4 INJECTION INTRA-ARTICULAR; INTRALESIONAL; INTRAMUSCULAR; INTRAVENOUS; SOFT TISSUE at 07:30

## 2024-04-24 RX ADMIN — BUPIVACAINE HYDROCHLORIDE 10 ML: 2.5 INJECTION, SOLUTION EPIDURAL; INFILTRATION; INTRACAUDAL; PERINEURAL at 07:10

## 2024-04-24 RX ADMIN — PROPOFOL 200 MG: 10 INJECTION, EMULSION INTRAVENOUS at 07:30

## 2024-04-24 RX ADMIN — SODIUM CHLORIDE, SODIUM LACTATE, POTASSIUM CHLORIDE, AND CALCIUM CHLORIDE 125 ML/HR: .6; .31; .03; .02 INJECTION, SOLUTION INTRAVENOUS at 05:57

## 2024-04-24 RX ADMIN — FENTANYL CITRATE 75 MCG: 50 INJECTION INTRAMUSCULAR; INTRAVENOUS at 07:30

## 2024-04-24 RX ADMIN — LIDOCAINE HYDROCHLORIDE 20 MG: 10 INJECTION, SOLUTION EPIDURAL; INFILTRATION; INTRACAUDAL; PERINEURAL at 07:30

## 2024-04-24 RX ADMIN — BUPIVACAINE 10 ML: 13.3 INJECTION, SUSPENSION, LIPOSOMAL INFILTRATION at 07:05

## 2024-04-24 RX ADMIN — FENTANYL CITRATE 50 MCG: 50 INJECTION INTRAMUSCULAR; INTRAVENOUS at 07:51

## 2024-04-24 RX ADMIN — SODIUM CHLORIDE 4 MCG: 9 INJECTION, SOLUTION INTRAVENOUS at 07:58

## 2024-04-24 RX ADMIN — BUPIVACAINE HYDROCHLORIDE 10 ML: 2.5 INJECTION, SOLUTION EPIDURAL; INFILTRATION; INTRACAUDAL; PERINEURAL at 07:05

## 2024-04-24 RX ADMIN — ACETAMINOPHEN 1000 MG: 10 INJECTION INTRAVENOUS at 05:59

## 2024-04-24 RX ADMIN — PROPOFOL 140 MCG/KG/MIN: 10 INJECTION, EMULSION INTRAVENOUS at 07:30

## 2024-04-24 RX ADMIN — ONDANSETRON 4 MG: 2 INJECTION INTRAMUSCULAR; INTRAVENOUS at 08:17

## 2024-04-24 RX ADMIN — ONDANSETRON 4 MG: 2 INJECTION INTRAMUSCULAR; INTRAVENOUS at 07:01

## 2024-04-24 RX ADMIN — MIDAZOLAM 2 MG: 1 INJECTION INTRAMUSCULAR; INTRAVENOUS at 07:01

## 2024-04-24 RX ADMIN — LIDOCAINE HYDROCHLORIDE 40 MG: 20 INJECTION, SOLUTION EPIDURAL; INFILTRATION; INTRACAUDAL at 07:30

## 2024-04-24 RX ADMIN — KETOROLAC TROMETHAMINE 15 MG: 30 INJECTION, SOLUTION INTRAMUSCULAR; INTRAVENOUS at 08:29

## 2024-04-24 NOTE — ANESTHESIA POSTPROCEDURE EVALUATION
"Post-Op Assessment Note    CV Status:  Stable    Pain management: adequate       Mental Status:  Awake   Hydration Status:  Stable   PONV Controlled:  Controlled   Airway Patency:  Patent     Post Op Vitals Reviewed: Yes    No anethesia notable event occurred.    Staff: Anesthesiologist           BP (!) 89/51   Pulse 62   Temp 98 °F (36.7 °C)   Resp 15   Ht 5' 5\" (1.651 m)   Wt 58.3 kg (128 lb 8.5 oz)   LMP 04/24/2024 (Exact Date)   SpO2 98%   BMI 21.39 kg/m²       BP      Temp      Pulse     Resp      SpO2        "

## 2024-04-24 NOTE — OP NOTE
OPERATIVE REPORT    PATIENT NAME: Alondra Blanton   :  1990  MRN: [id number]  Pt Location: [facility] OR ROOM 03    SURGERY DATE: 2024    SURGEON(S) and ROLE:  Primary: Sergio Singer DO  Assisting: Chinamy Alexandre PA-C    NOTE:  The presence of a physician assistant was necessary to help with patient positioning, surgical exposure, wound retraction, wound closure, and other key portions of the procedure.  No qualified resident was available for this case.      PREOPERATIVE DIAGNOSES:  Left tibia osteochondroma    POSTOPERATIVE DIAGNOSES:  Left tibia osteochondroma    PROCEDURES:  Removal of left tibia osteochondroma      ANESTHESIA TYPE:  General LMA, regional block    ANESTHESIA STAFF:   CRNA: Jacob Sidhu CRNA    ESTIMATED BLOOD LOSS: Minimal mL    TOURNIQUET TIME: 14 minutes at 300 mmHg    PERIOPERATIVE ANTIBIOTICS:  cefazolin, 1 gram    IMPLANTS: None    * No implants in log *    SPECIMENS:    ID Type Source Tests Collected by Time Destination   1 : left tibia tumor Tissue Bone TISSUE EXAM Sergio Singer DO 2024 0755        DRAINS:  None      OPERATIVE INDICATIONS:  The patient is a 33 y.o. female with left tibial osteochondroma.  Surgical treatment was indicated due to persistent symptoms despite non-surgical treatment and significant pain and discomfort with ambulation and normal activities of daily living.  After a thorough discussion of the potential risks, benefits, and alternative treatments, the patient agreed to proceed with surgery.  The patient understands that the risks of surgery include, but are not limited to: infection, neurovascular injury, wound healing complications, venous thromboembolism, persistent pain, stiffness, instability, recurrence of symptoms, potential need for additional surgeries, and loss of limb or life.  Oral and written consent for surgery was obtained from the patient preoperatively.    PROCEDURE AND TECHNIQUE:  On the day of surgery, the patient was identified by  myself in the preoperative holding area.  The operative site was marked by the surgeon as the proper operative extremity.  they were taken to the operating room where she was transferred operating room table, she placed in the supine position with all bony prominences well-padded.    The patient was anesthetized, intubated and sedated in the standard manner and perioperative antibiotics, Ancef, were administered.  The patient was positioned supine on the OR table.  A tourniquet cuff was applied to the operative extremity, set at 300 mmHg.  The operative site was prepped and draped using standard sterile technique.  A time-out was conducted to confirm the patient's identity, identifying all team members in the room, the operative site, and the proposed procedure.     Esmarch tourniquet was used to exsanguinate the limb, inflated to 300 mmHg.  Approximately 5 cm incision was made over the posterior medial tibia after injecting lidocaine with epinephrine and the surgical site.  Skin was sharply incised down to the subcutaneous fat.  Blunt dissection was performed with a Metzenbaum scissors.  Hemostasis was obtained and crossing small veins were coagulated.  Care was taken to preserve saphenous vein and possible saphenous nerve.  Careful dissection was performed down to the tibial mass.  Retractors were placed, the head is anserine tendons were overlying the mass these were mobilized superiorly.  Bursa of the osteochondroma was identified opened and the mass was then identified deep to this.  According to curved osteotome was placed at the neck of the mass adjacent to the posterior medial cortex of the tibia and the mass was then removed using a mallet.  This is measured on the back table to be approximately 3-1/2 cm x 1 cm.  Rongeur was used to smooth out the stalk tibia interface.  A rasp was used to smooth out the resection area.  Bone wax was then packed within the defect.  Tourniquet was let down 14 minutes,  hemostasis was obtained.        Wound was irrigated with normal saline, cocktail injection was then placed within the tissue.   Deep tissue was closed with #1 Vicryl. Subcutaneous tissue was closed with 2-0 Monocryl.  Skin was closed with 3-0 Monocryl and skin glue.  Skin was cleansed and dried, Mepilex dressing was placed.       I was present for the entire procedure., A qualified resident physician was not available. and A physician assistant was required during the procedure for retraction, tissue handling, dissection and suturing.      COMPLICATIONS:  None    PATIENT DISPOSITION:  PACU  and extubated and stable    Plan:  WBAT to LLE  ROM: full  PT/OT  DVT PPX: none, ambulation  Mepilex to remain in place until follow up in 10-14 days  Drain: none  May readjust ACE wrap as needed  Multimodal pain control    SIGNATURE:  Sergio Singer DO  DATE:  April 24, 2024  TIME:  8:32 AM

## 2024-04-24 NOTE — DISCHARGE INSTR - AVS FIRST PAGE
Discharge Instructions  Dr. Sergio Singer, DO, Orthopedic Surgeon  Orthopedic Oncology & Sarcoma Surgery   [facility]  [phone]     What to Expect/Activity  It is normal to have some discomfort  at the surgical site for several days to weeks.  Weight bearing status: BEAR FULL WEIGHT AS TOLERATED on the operative leg.  Use crutches to assist only as needed.  Please use crutches/walker when ambulating until your follow-up  Swelling and discomfort in the area is normal for several days after surgery. For the first 2-3 days, use ice to help. Use for 20-30 minutes every 1-2 hours for 48 hours, while awake. You may continue beyond 48 hours as needed.  RANGE OF MOTION: You are allowed FULL RANGE OF MOTION as tolerated.  IMMOBILIZATION: None.  You are allowed full range of motion as tolerated.    Dressing/Wound Care/Bathing  There will be a surgical dressing over your incision that stays in place until follow up.   ACE wrap may be present beyond incision. This can be readjusted as necessary for compression  You may shower, but must cover the dressing with a taped plastic bag or other waterproof barrier until follow up appointment   Do not place any creams, ointments or gels on or around the dressing  No baths, swimming or submerging until cleared by Dr. Singer    Pain Management/Medications  You may resume your usual medications.  Please take the following medications:  Anti-coagulation (blood clot prevention)- None- ambulation  Pain medication:  Narcotic: Take as directed  NSAID/Anti-inflammatory: Take as directed  Tylenol : take as directed  Zofran (ondasetron) - 4mg every 8 hours as needed for nausea  Stool softeners (senna/colace) - take daily to prevent constipation as narcotic pain medication causes constipation  Antibiotic - take as directed if prescribed   If you have questions or pain concerns, please contact the office.   Pain medication cannot remove all post-operative pain.  Cold  Therapy:  The cold therapy device may be used either continuously or only as needed, according to your preference.  Do not let the pad directly touch your skin.  Alternatively, apply ice (20 min on, 20 min off) as often as you feel is necessary.  Elevation:  Elevate the entire leg above heart level.  Place pillows under your extremity to assist  Compression:  Apply ACE wraps or compression stockings as needed.    Follow up/Call if:  The findings of your surgery will be explained to you and your family immediately after surgery. However, in the post-operative period, during recovery from anesthesia you may not fully remember or fully understand what was said. This will be again gone over when you return for your post-op appointment.  Please contact Dr. Singer's office if you experience the following:  Excessive bleeding (bleeding through your dressing)  Fever greater than 101 degrees F after 48 hours (low grade fevers the day or two after surgery are normal)  Persistent nausea or vomiting  Decreased sensation or discoloration of the operative limb  Pain or swelling that is getting worse and not better with medication  Shortness of breath, chest pain, or other concerning symptoms      FOLLOW-UP APPOINTMENT:  10-14 days after surgery     Office Contact: [phone]

## 2024-04-24 NOTE — H&P
"H&P Exam - Orthopedics   Alondra Blanton 33 y.o. female MRN: [id number]  Unit/Bed#: HOLDING 2    Assessment/Plan     Assessment:  33 y.o. female with left knee osteochondroma    Plan:  We will proceed with removal of  left knee osteochondroma    History of Present Illness   HPI:  Alondra Blanton is a 33 y.o. female who presents with  left knee osteochondroma.    As per previous note, they have failed all conservative measures and wish to proceed with surgery.    They have been NPO since midnight. They are keen for surgery today.    Review of Systems:   Constitutional: Negative for fatigue, fever or loss of appetite.   HENT: Negative.    Respiratory: Negative for shortness of breath, dyspnea.    Cardiovascular: Negative for chest pain/tightness.   Gastrointestinal: Negative for abdominal pain, N/V.   Endocrine: Negative for cold/heat intolerance, unexplained weight loss/gain.   Genitourinary: Negative for flank pain, dysuria  Musculoskeletal: positive for arthralgia , pain at mass  Skin: Negative for rash.    Neurological: negative for numbness or tingling  Psychiatric/Behavioral: Negative for agitation.    Physical Exam:  /59   Pulse 74   Temp 97.8 °F (36.6 °C) (Temporal)   Resp 16   Ht 5' 5\" (1.651 m)   Wt 58.3 kg (128 lb 8.5 oz)   LMP 04/24/2024 (Exact Date)   SpO2 99%   BMI 21.39 kg/m²   Cons: Appears well.  No apparent distress.  Psych: Alert. Oriented x3.  Mood and affect normal.  Eyes: PERRLA, EOMI  Resp: Normal effort.  No audible wheezing or stridor.  CV: Extremities warm and well perfused.   Abd:    No distention or guarding.   Skin: Warm. No visible lesions.  Neuro: Normal muscle tone.     Ortho Exam:   Skin is intact over surgical site without erythema, ecchymosis, or lesions  ROM: fully intact  Motor grossly intact to +TA, EHL, GSC  SILT distally    Lab Results: Reviewed  Imaging: Reviewed    Historical Information  Past Medical History:   Diagnosis Date    Abnormal Pap smear of cervix     Anal " fissure     Anxiety     Constipation     Depression     Headache     migranes    Headache(784.0) 2014    IBS (irritable bowel syndrome)     Migraine     Psoriasis     Seasonal allergies     Varicella      Past Surgical History:   Procedure Laterality Date    COLPOSCOPY      EYE SURGERY  2018    LASIK    GYNECOLOGIC CRYOSURGERY      MO COLONOSCOPY FLX DX W/COLLJ SPEC WHEN PFRMD N/A 08/24/2016    Procedure: EGD AND COLONOSCOPY;  Surgeon: Prabhakar Delgado MD;  Location: [facility];  Service: Gastroenterology    WISDOM TOOTH EXTRACTION      all four       Sergio Singer, DO

## 2024-04-24 NOTE — ANESTHESIA PROCEDURE NOTES
Peripheral Block    Patient location during procedure: holding area  Start time: 4/24/2024 7:05 AM  Reason for block: procedure for pain, at surgeon's request and post-op pain management  Staffing  Performed by: Jeferson Harris MD  Authorized by: Jeferson Harris MD    Preanesthetic Checklist  Completed: patient identified, IV checked, site marked, risks and benefits discussed, surgical consent, monitors and equipment checked, pre-op evaluation and timeout performed  Peripheral Block  Patient position: supine  Prep: ChloraPrep  Patient monitoring: frequent blood pressure checks, continuous pulse oximetry and heart rate  Block type: Adductor Canal  Laterality: left  Injection technique: single-shot  Procedures: ultrasound guided, Ultrasound guidance required for the procedure to increase accuracy and safety of medication placement and decrease risk of complications.  Ultrasound permanent image saved  Needle  Needle type: Stimuplex   Needle localization: anatomical landmarks and ultrasound guidance  Assessment  Injection assessment: incremental injection, frequent aspiration, injected with ease, negative aspiration, negative for heart rate change, no paresthesia on injection, no symptoms of intraneural/intravenous injection and needle tip visualized at all times  Paresthesia pain: immediately resolved  Post-procedure:  site cleaned  patient tolerated the procedure well with no immediate complications  Additional Notes  Single needle pass, needle visualized throughout, minimal resistance on injection, appropriate perineural spread, image saved

## 2024-04-24 NOTE — ANESTHESIA PROCEDURE NOTES
Peripheral Block    Patient location during procedure: holding area  Start time: 4/24/2024 7:10 AM  Reason for block: procedure for pain, at surgeon's request and post-op pain management  Staffing  Performed by: Jeferson Harris MD  Authorized by: Jeferson Harris MD    Preanesthetic Checklist  Completed: patient identified, IV checked, site marked, risks and benefits discussed, surgical consent, monitors and equipment checked, pre-op evaluation and timeout performed  Peripheral Block  Patient position: right lateral  Prep: ChloraPrep  Patient monitoring: frequent blood pressure checks, continuous pulse oximetry and heart rate  Block type: Popliteal  Laterality: left  Injection technique: single-shot  Procedures: ultrasound guided, Ultrasound guidance required for the procedure to increase accuracy and safety of medication placement and decrease risk of complications.  Ultrasound permanent image saved  Needle  Needle type: Stimuplex   Needle localization: anatomical landmarks and ultrasound guidance  Assessment  Injection assessment: incremental injection, frequent aspiration, injected with ease, negative aspiration, negative for heart rate change, no paresthesia on injection, no symptoms of intraneural/intravenous injection and needle tip visualized at all times  Paresthesia pain: immediately resolved  Post-procedure:  site cleaned  patient tolerated the procedure well with no immediate complications  Additional Notes  Single needle pass, needle visualized throughout, minimal resistance on injection, appropriate perineural spread, image saved

## 2024-04-29 PROCEDURE — 88307 TISSUE EXAM BY PATHOLOGIST: CPT | Performed by: STUDENT IN AN ORGANIZED HEALTH CARE EDUCATION/TRAINING PROGRAM

## 2024-04-29 PROCEDURE — 88311 DECALCIFY TISSUE: CPT | Performed by: STUDENT IN AN ORGANIZED HEALTH CARE EDUCATION/TRAINING PROGRAM

## 2024-05-09 ENCOUNTER — OFFICE VISIT (OUTPATIENT)
Dept: OBGYN CLINIC | Facility: MEDICAL CENTER | Age: 34
End: 2024-05-09

## 2024-05-09 VITALS
WEIGHT: 129 LBS | DIASTOLIC BLOOD PRESSURE: 60 MMHG | BODY MASS INDEX: 21.49 KG/M2 | HEIGHT: 65 IN | SYSTOLIC BLOOD PRESSURE: 100 MMHG | HEART RATE: 80 BPM

## 2024-05-09 DIAGNOSIS — D16.22 OSTEOCHONDROMA OF LEFT LOWER LEG: Primary | ICD-10-CM

## 2024-05-09 PROCEDURE — 99024 POSTOP FOLLOW-UP VISIT: CPT | Performed by: STUDENT IN AN ORGANIZED HEALTH CARE EDUCATION/TRAINING PROGRAM

## 2024-05-27 DIAGNOSIS — F41.8 ANXIETY WITH DEPRESSION: ICD-10-CM

## 2024-05-28 RX ORDER — ESCITALOPRAM OXALATE 20 MG/1
20 TABLET ORAL DAILY
Qty: 30 TABLET | Refills: 5 | Status: SHIPPED | OUTPATIENT
Start: 2024-05-28

## 2024-06-06 ENCOUNTER — OFFICE VISIT (OUTPATIENT)
Dept: OBGYN CLINIC | Facility: MEDICAL CENTER | Age: 34
End: 2024-06-06

## 2024-06-06 VITALS
DIASTOLIC BLOOD PRESSURE: 61 MMHG | WEIGHT: 130 LBS | HEART RATE: 69 BPM | BODY MASS INDEX: 21.66 KG/M2 | HEIGHT: 65 IN | SYSTOLIC BLOOD PRESSURE: 93 MMHG

## 2024-06-06 DIAGNOSIS — D16.22 OSTEOCHONDROMA OF LEFT LOWER LEG: Primary | ICD-10-CM

## 2024-06-06 PROCEDURE — 99024 POSTOP FOLLOW-UP VISIT: CPT | Performed by: STUDENT IN AN ORGANIZED HEALTH CARE EDUCATION/TRAINING PROGRAM

## 2024-06-06 NOTE — PROGRESS NOTES
"Orthopedic Oncology Post Operative Office Note  Alondra Blanton (33 y.o. female)   : 1990   MRN: [id number]   Encounter Date: 2024  Dr. Sergio Singer DO, Orthopedic Surgeon  Orthopedic Oncology [facility]   DOS: 2024  Procedure performed: Osteochondroma- proximal tibia- REMOVAL TUMOR BONE - Left      Impression/Plan: 33 y.o. female with a history of left medial proximal tibial osteochondroma 6 weeks s/p Osteochondroma- proximal tibia- REMOVAL TUMOR BONE - Left   Final pathology consistent with osteochondroma.     S/p Excision of mass of left proximal tibia osteochondroma    Wound Care   Continue current care., Encouraged scar care with vitamin E oil.  Continue with scar care to break up the adhesions  Pain control: PRN  Physical therapy: Not indicated at this time  Referral for PT placed for scar care and desensitization of incision site - HEP  Full WB to left lower extremity  Sling/Immobilizer/Brace: N/A  Complete DVT ppx ambulation    Return if symptoms worsen or fail to improve.  for evaluation without x-ray      2. Osteochondroma of left tibia   - Definitve diagnosis discussed at length.   - Recommending ADL's as tolerated   - no need for monitoring of recurrence         Subjective:  33 y.o. female 6 weeks s/p Osteochondroma- proximal tibia- REMOVAL TUMOR BONE - Left  DOS: 2024     Pain/Complaints: soreness with prolonged walking    Numbness/weakness extremity: mild anterior shin  numbness   Physical Therapy Progress: Referral placed for scar care and desensitization    DVT ppx: ambulation   Abx: N/A   Eating/Drinking improving. Bowel/Bladder: WNL  Denies fever/chills, numbness/tingling, injury/trauma, night sweats    Physical Exam:  Height: 5' 5\" (165.1 cm)  Weight - Scale: 59 kg (130 lb)  BMI (Calculated): 21.6  BSA (Calculated - m2): 1.65 sq meters     Vitals:    24 1139   BP: 93/61   Pulse: 69     Body mass index is 21.63 kg/m².    General: alert and oriented x 3; well " "nourished/well developed; no apparent distress.    Drains:  N/A    Extremity: Left tibia   no swelling throughout  Dressing: N/A  Surgical site:  no erythema, no drainage, skin intact   Sensation: decreased   Motor: EHL/FHL/DF/PF  Brisk cap refill  Calf: bilateral calves soft, nontender    Labs:   N/A    Pathology: FINAL  BONE, LEFT TIBIA, EXCISION:    - Benign fragments of cortical bone and cartilage, compatible with osteochondroma in the right clinical and radiologic setting.   Gross Description    A. The specimen is received in formalin, labeled with the patient's name and hospital number, and is designated \" left tibia tumor\".  The specimen consists of 3 tan-brown osseous tissue fragments that are irregularly-shaped measuring in aggregate of 2.7 x 2.2 x 1.3 cm.  Representative section(s). One cassette. The specimen is placed into Immunocal after proper fixation time.     Note: The estimated total formalin fixation time based upon information provided by the submitting clinician and the standard processing schedule is under 72 hours.  -Victorina Miranda   Clinical Information    IMPRESSION:  Bone lesion posteromedial aspect of proximal tibial diaphysis communicating with the medullary cavity most consistent with pedunculated osteochondroma, stable. Cartilage cap measures up to 5 mm in thickness with adjacent reactive soft tissue edema. No  additional lesions identified. Images were acquired for preoperative planning.       Microbiology:  Cultures: N/A    Imaging:   Study: Left knee xrays  Date: 2/5/2024  Report: I have read and agree with the radiologist report.  My impression is as follows: benign appearing pedunculated osteochondroma of the medial proximal tibia     Study: Left knee MRI  Date: 2/10/2024  Report: I have read and agree with the radiologist report.  My impression is as follows: benign appearing pedunculated osteochondroma of the medial proxximal tibia        Oncology History    No history exists. "       Scribe Attestation    I,:  Shanthi Milton am acting as a scribe while in the presence of the attending physician.:       I,:  Sergio Singer DO personally performed the services described in this documentation    as scribed in my presence.:          Dr. Sergio Singer DO, Orthopedic Surgeon  [facility]thopedic Oncology [facility]   Phone:[phone] Fax:[phone]

## 2024-06-25 ENCOUNTER — ANNUAL EXAM (OUTPATIENT)
Dept: OBGYN CLINIC | Facility: CLINIC | Age: 34
End: 2024-06-25
Payer: COMMERCIAL

## 2024-06-25 VITALS
DIASTOLIC BLOOD PRESSURE: 60 MMHG | HEIGHT: 65 IN | BODY MASS INDEX: 21.33 KG/M2 | WEIGHT: 128 LBS | SYSTOLIC BLOOD PRESSURE: 98 MMHG

## 2024-06-25 DIAGNOSIS — Z01.419 WOMEN'S ANNUAL ROUTINE GYNECOLOGICAL EXAMINATION: Primary | ICD-10-CM

## 2024-06-25 PROCEDURE — S0612 ANNUAL GYNECOLOGICAL EXAMINA: HCPCS | Performed by: OBSTETRICS & GYNECOLOGY

## 2024-06-25 NOTE — PROGRESS NOTES
ASSESSMENT & PLAN:   Diagnoses and all orders for this visit:    Women's annual routine gynecological examination    Other orders  -     azelaic acid (AZELEX) 20 % cream; Apply topically 2 (two) times a day          The following were reviewed in today's visit: ASCCP guidelines, Gardisil vaccination, STD testing breast self exam, family planning choices, exercise, and healthy diet.    Patient to return to office in yearly for annual exam.     All questions have been answered to her satisfaction.        CC:  Annual Gynecologic Examination  Chief Complaint   Patient presents with    Gynecologic Exam     Annual exam, pap not indicated. Pt is well, no concerns at this time.   Last pap 06/15/2022 Neg pap        HPI: Alondra Blanton is a 33 y.o.  who presents for annual gynecologic examination.  She has the following concerns:  none      Health Maintenance:    Exercise: frequently  Breast exams/breast awareness: yes    Past Medical History:   Diagnosis Date    Abnormal Pap smear of cervix     Anal fissure     Anxiety     Constipation     Depression     Headache     migranes    Headache(784.0)     IBS (irritable bowel syndrome)     Migraine     Psoriasis     Seasonal allergies     Varicella        Past Surgical History:   Procedure Laterality Date    COLPOSCOPY      EYE SURGERY  2018    LASIK    GYNECOLOGIC CRYOSURGERY      OR COLONOSCOPY FLX DX W/COLLJ SPEC WHEN PFRMD N/A 2016    Procedure: EGD AND COLONOSCOPY;  Surgeon: Prabhakar Delgado MD;  Location: [facility];  Service: Gastroenterology    OR EXCISION/CURETTAGE CYST/TUMOR FEMUR Left 2024    Procedure: Osteochondroma- proximal tibia- REMOVAL TUMOR BONE;  Surgeon: Sergio Singer DO;  Location: [facility];  Service: Orthopedics    WISDOM TOOTH EXTRACTION      all four       Past OB/Gyn History:  Period Cycle (Days): 29  Period Duration (Days): 5-6  Period Pattern: Regular  Menstrual Flow: Moderate  Menstrual Control: Maxi pad, Thin pad, TamponPatient's  last menstrual period was 06/16/2024 (exact date).    Last Pap: 6/15/2022 : no abnormalities  History of abnormal Pap smear: yes  HPV vaccine completed: no    Patient is currently sexually active.   STD testing: no  Current contraception: coitus interruptus      Family History  Family History   Problem Relation Age of Onset    Anxiety disorder Mother     Uterine cancer Mother 51    Mental illness Mother         Anxiety    Cancer Mother         Uterine cancer    No Known Problems Father     No Known Problems Brother     Hypertension Maternal Grandmother     Mental illness Maternal Grandmother     Hyperlipidemia Maternal Grandmother     Thyroid disease Maternal Grandmother     Arthritis Maternal Grandmother     Autoimmune disease Maternal Grandmother         Hashimotos thyroiditis    Anxiety disorder Maternal Grandmother     Scleroderma Paternal Grandmother     Autoimmune disease Paternal Grandmother         Scleraderma    Cancer Paternal Grandfather         multiple mylenoma     Mental illness Paternal Grandfather     Sjogren's syndrome Paternal Grandfather     Hyperlipidemia Paternal Grandfather     Thrombosis Paternal Grandfather         DVT    Autoimmune disease Paternal Grandfather         Sjrogrena       Family history of uterine or ovarian cancer: yes  Family history of breast cancer: no  Family history of colon cancer: no    Social History:  Social History     Socioeconomic History    Marital status: /Civil Union     Spouse name: Venkata Blanton    Number of children: Not on file    Years of education: Not on file    Highest education level: Bachelor's degree (e.g., BA, AB, BS)   Occupational History    Occupation: RN   Tobacco Use    Smoking status: Never    Smokeless tobacco: Never   Vaping Use    Vaping status: Never Used   Substance and Sexual Activity    Alcohol use: Yes     Alcohol/week: 0.0 - 1.0 standard drinks of alcohol     Comment: Socially    Drug use: No    Sexual activity: Yes     Partners:  "Male     Birth control/protection: Coitus interruptus   Other Topics Concern    Not on file   Social History Narrative    Always uses seatbelts    Caffeine use     Social Determinants of Health     Financial Resource Strain: Not on file   Food Insecurity: Not on file   Transportation Needs: Not on file   Physical Activity: Not on file   Stress: Not on file   Social Connections: Unknown (6/18/2024)    Received from [employer]     How often do you feel lonely or isolated from those around you? (Adult - for ages 18 years and over): Not on file   Intimate Partner Violence: Not on file   Housing Stability: Not on file     Domestic violence screen: negative    Allergies:  Allergies   Allergen Reactions    Celebrex [Celecoxib] Rash       Medications:    Current Outpatient Medications:     Atogepant (Qulipta) 60 MG TABS, Take 1 tablet by mouth every morning, Disp: 30 tablet, Rfl: 6    azelaic acid (AZELEX) 20 % cream, Apply topically 2 (two) times a day, Disp: , Rfl:     docusate sodium (Colace) 100 mg capsule, , Disp: , Rfl:     escitalopram (LEXAPRO) 20 mg tablet, TAKE 1 TABLET BY MOUTH EVERY DAY, Disp: 30 tablet, Rfl: 5    Magnesium 200 MG CHEW, , Disp: , Rfl:     SUMAtriptan (IMITREX) 100 mg tablet, Take 1 tablet (100 mg total) by mouth once as needed for migraine Okay to repeat in 2 hours. No more than 2 in 24 hours., Disp: 12 tablet, Rfl: 6    celecoxib (CeleBREX) 100 mg capsule, Take 1 capsule (100 mg total) by mouth 2 (two) times a day for 14 days, Disp: 28 capsule, Rfl: 0    Review of Systems:  Review of Systems   Constitutional: Negative.    HENT: Negative.     Respiratory: Negative.     Cardiovascular: Negative.    Gastrointestinal: Negative.    Genitourinary: Negative.    Neurological: Negative.    Psychiatric/Behavioral: Negative.           Physical Exam:  BP 98/60 (BP Location: Left arm, Patient Position: Sitting, Cuff Size: Standard)   Ht 5' 5\" (1.651 m)   Wt 58.1 kg (128 lb)   LMP " 06/16/2024 (Exact Date)   Breastfeeding No   BMI 21.30 kg/m²    Physical Exam  Constitutional:       Appearance: Normal appearance.   Genitourinary:      Bladder and urethral meatus normal.      No lesions in the vagina.      Right Labia: No rash, tenderness, lesions, skin changes or Bartholin's cyst.     Left Labia: No tenderness, lesions, skin changes, Bartholin's cyst or rash.     No vaginal erythema, tenderness or bleeding.        Right Adnexa: not tender, not full and no mass present.     Left Adnexa: not tender, not full and no mass present.     Cervix is parous.      No cervical motion tenderness, discharge, lesion or polyp.      Uterus is not enlarged, fixed or tender.      No uterine mass detected.     Urethral meatus caruncle not present.     No urethral tenderness or mass present.   Breasts:     Right: No swelling, bleeding, inverted nipple, mass, nipple discharge, skin change or tenderness.      Left: No swelling, bleeding, inverted nipple, mass, nipple discharge, skin change or tenderness.   HENT:      Head: Normocephalic and atraumatic.   Eyes:      Extraocular Movements: Extraocular movements intact.      Conjunctiva/sclera: Conjunctivae normal.      Pupils: Pupils are equal, round, and reactive to light.   Cardiovascular:      Rate and Rhythm: Normal rate and regular rhythm.      Heart sounds: Normal heart sounds. No murmur heard.  Pulmonary:      Effort: Pulmonary effort is normal. No respiratory distress.      Breath sounds: Normal breath sounds. No wheezing or rales.   Abdominal:      General: There is no distension.      Palpations: Abdomen is soft.      Tenderness: There is no abdominal tenderness. There is no guarding.   Neurological:      General: No focal deficit present.      Mental Status: She is alert and oriented to person, place, and time.   Skin:     General: Skin is warm and dry.   Psychiatric:         Mood and Affect: Mood normal.         Behavior: Behavior normal.   Vitals and  nursing note reviewed.

## 2024-07-02 ENCOUNTER — EVALUATION (OUTPATIENT)
Dept: PHYSICAL THERAPY | Facility: REHABILITATION | Age: 34
End: 2024-07-02
Payer: COMMERCIAL

## 2024-07-02 DIAGNOSIS — D16.22 OSTEOCHONDROMA OF LEFT LOWER LEG: ICD-10-CM

## 2024-07-02 DIAGNOSIS — L90.5 SCAR ADHERENT: Primary | ICD-10-CM

## 2024-07-02 PROCEDURE — 97140 MANUAL THERAPY 1/> REGIONS: CPT | Performed by: PHYSICAL THERAPIST

## 2024-07-02 PROCEDURE — 97161 PT EVAL LOW COMPLEX 20 MIN: CPT | Performed by: PHYSICAL THERAPIST

## 2024-07-02 NOTE — PROGRESS NOTES
PT Evaluation     Today's date: 2024  Patient name: Alondra Blanton  : 1990  MRN: [id number]  Referring provider: Sergio Singer DO  Dx:   Encounter Diagnosis     ICD-10-CM    1. Scar adherent  L90.5       2. Osteochondroma of left lower leg  D16.22 Ambulatory Referral to Physical Therapy          Start Time: 1115  Stop Time: 1142  Total time in clinic (min): 27 minutes    Assessment  Impairments: abnormal or restricted ROM, activity intolerance, impaired physical strength, lacks appropriate home exercise program and pain with function  Symptom irritability: low    Assessment details: Pt is a 32 yo F with hx of left medial proximal tibial osteochondroma L Osteochondroma- proximal tibia bone tumor removal performed 2024 PT referral give for  scar care and desensitization of incision site. She presents with decreased scar mobility. Pt would benefit skilled PT to address these impairments and improve scar mobility.  Barriers to therapy: None   Understanding of Dx/Px/POC: good     Prognosis: good    Goals  Short term goals= Long term goals: to be met in 4-6 visits  Pt will report a 85% or > reduction in subjective pain complaints/symptoms to better manage ADLs and functional mobility.  Pt independent with rationale, technique and plan for performance of advanced HEP to ensure independent self-management of sx's.  Achieve pts therapy goal: loosen up scar tissue and improve scar mobility       Plan  Patient would benefit from: skilled physical therapy  Planned modality interventions: TENS, thermotherapy: hydrocollator packs, traction, ultrasound, cryotherapy and low level laser therapy    Planned therapy interventions: body mechanics training, therapeutic training, therapeutic exercise, therapeutic activities, stretching, strengthening, neuromuscular re-education, patient education, home exercise program, functional ROM exercises, flexibility, manual therapy, Sanchez taping, joint mobilization, balance  and IASTM    Frequency: 1-2x/week.  Duration in weeks: 4  Treatment plan discussed with: patient        Subjective Evaluation    History of Present Illness  Date of surgery: 4/24/2024  Mechanism of injury: surgery  Mechanism of injury: Pt is a 32 yo F with hx of left medial proximal tibial osteochondroma 10 weeks s/p L Osteochondroma- proximal tibia bone tumor removal. PT referral give for  scar care and desensitization of incision site. Pt denies pain/difficulty. Reports she just has scar adhesions. States the tumor was not cancerous. Does report anterior/medial numbness in the shin as well.  Patient Goals  Patient goals for therapy: increased motion  Patient goal: loosen up scar tissue and improve scar mobility  Pain  No pain reported    Treatments  No previous or current treatments        Objective    LLE strength grossly 5/5 throughout  L knee and ankle ROM WNL    Decreased scar mobility           Precautions: anxiety/dep      POC exp =  7/30/24      Manuals 7/2            L LE scar - Laser dir contact post surg protocol DS            IASTM/STM scar mob DS                                      Neuro Re-Ed                                                                                                        Ther Ex                                                                                                                     Ther Activity                                       Gait Training                                       Modalities

## 2024-07-08 ENCOUNTER — OFFICE VISIT (OUTPATIENT)
Dept: FAMILY MEDICINE CLINIC | Facility: CLINIC | Age: 34
End: 2024-07-08
Payer: COMMERCIAL

## 2024-07-08 VITALS
TEMPERATURE: 98.4 F | DIASTOLIC BLOOD PRESSURE: 60 MMHG | HEART RATE: 89 BPM | HEIGHT: 65 IN | SYSTOLIC BLOOD PRESSURE: 100 MMHG | OXYGEN SATURATION: 98 % | BODY MASS INDEX: 20.99 KG/M2 | RESPIRATION RATE: 16 BRPM | WEIGHT: 126 LBS

## 2024-07-08 DIAGNOSIS — F41.9 INSOMNIA SECONDARY TO ANXIETY: ICD-10-CM

## 2024-07-08 DIAGNOSIS — Z00.00 ENCOUNTER FOR WELLNESS EXAMINATION IN ADULT: Primary | ICD-10-CM

## 2024-07-08 DIAGNOSIS — F51.05 INSOMNIA SECONDARY TO ANXIETY: ICD-10-CM

## 2024-07-08 DIAGNOSIS — G25.81 RLS (RESTLESS LEGS SYNDROME): ICD-10-CM

## 2024-07-08 DIAGNOSIS — F41.8 ANXIETY WITH DEPRESSION: ICD-10-CM

## 2024-07-08 PROCEDURE — 99395 PREV VISIT EST AGE 18-39: CPT | Performed by: FAMILY MEDICINE

## 2024-07-08 RX ORDER — LORAZEPAM 0.5 MG/1
0.5 TABLET ORAL
Qty: 30 TABLET | Refills: 0 | Status: SHIPPED | OUTPATIENT
Start: 2024-07-08

## 2024-07-08 RX ORDER — HYDROXYZINE HYDROCHLORIDE 25 MG/1
TABLET, FILM COATED ORAL
Qty: 60 TABLET | Refills: 0 | Status: SHIPPED | OUTPATIENT
Start: 2024-07-08

## 2024-07-08 NOTE — PATIENT INSTRUCTIONS
Please gradually switch Lexapro and Qulipta to the morning  Please start hydroxyzine/Atarax, take 1 to 2 tablets at night  If this medication is ineffective-you may use Ativan as needed and please message  Please take magnesium oxide 500 mg daily every night  Repeat  ferritin level  We will touch base regarding daily anxiety med at next visit

## 2024-07-08 NOTE — PROGRESS NOTES
Ambulatory Visit  Name: Alondra Blatnon      : 1990      MRN: [id number]  Encounter Provider: Kerri Gaffney MD  Encounter Date: 2024   Encounter department: [facility]    Assessment & Plan   1. Encounter for wellness examination in adult  2. Insomnia secondary to anxiety  -     hydrOXYzine HCL (ATARAX) 25 mg tablet; Take 1-2 tabs at bedtime as needed  for insomnia/anxiety  -     LORazepam (Ativan) 0.5 mg tablet; Take 1 tablet (0.5 mg total) by mouth daily at bedtime as needed for anxiety  3. RLS (restless legs syndrome)  Assessment & Plan:  Intermittent symptoms of restless legs. Not daily.  Strictly at bedtime.  Previous workup included decreased level of ferritin at 9 (patient could not tolerate oral iron) and slightly decreased level of vitamin D.  No risk factors for neuropathy or vascular disease.Hemoglobin A1c was normal.Patient reports chronic insomnia and I wonder if that is the root of her problem rather than RLS.    We discussed treatment options.  She prefers to avoid gabapentin or Elavil or Pamelor.  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  She wonders if Lexapro is the cause of her symptoms, patient also addressed similar concern of Qulipta with neurologist.  Patient recalls good response to Ativan that was prescribed by her previous PCP and is requesting an Rx.    Long discussion with the patient who is being quite emotional and tearful through this conversation.  I explained her that Ativan will probably work for insomnia/anxiety but we should not be creating the pattern of regular Ativan use due to potential for dependency.  Patient understands and agrees.    We agreed to start hydroxyzine 25 to 50 mg nightly as needed.  Patient will message me with an update in a week.  If this medication is not helpful we will consider alternative, possibly trazodone.    I provided  patient with the prescription for Ativan since medication was highly effective in the past and she has been struggling with insomnia for many months.  I advised her not to use it together with hydroxyzine but rather keep it on hand and use only in case if other medications are ineffective.    We discussed possibly trialing alternative medication for anxiety but agreed that we should normalize her sleep first prior to further Rx changes.    Patient will proceed with repeat blood work to retest ferritin and vitamin D.  If ferritin level remains low-we may consider iron infusions since she could not tolerate oral iron (nausea)  Orders:  -     Ferritin; Future  -     Iron Panel (Includes Ferritin, Iron Sat%, Iron, and TIBC); Future  -     Vitamin D 25 hydroxy; Future  4. Anxiety with depression  Assessment & Plan:  Lexapro 20 mg daily.  Patient takes Rx at night and will gradually shifted to the morning dosing.  Chronic insomnia, possibly underlying anxiety in spite of Rx.  Follow-up in 6 to 8 weeks once sleep cycle normalizes       Patient Instructions   Please gradually switch Lexapro and Qulipta to the morning  Please start hydroxyzine/Atarax, take 1 to 2 tablets at night  If this medication is ineffective-you may use Ativan as needed and please message  Please take magnesium oxide 500 mg daily every night  Repeat  ferritin level  We will touch base regarding daily anxiety med at next visit      History of Present Illness     The patient presents for annual well exam.    Regular menses.  She flows 5 to 6 days, not excessively heavy.    She is up-to-date with GYN exam.    She complains burning sensation in her hands and feet associated with involuntary movement of her feet.  Symptoms do not occur on a daily basis but may happen few days in the row.  Patient believes that it is restless leg syndrome.  Upon further questioning, patient admits that she has been having persistent trouble with insomnia since the birth of  her second son for nearly 18 months.  Patient said that she may stay up for a few hours and only sleep 5 to 6 hours/day.  Feels tired daytime.    Patient reports RLS symptoms during her first pregnancy, symptoms went away.  Subsequently she developed RLS symptoms during third trimester of second pregnancy and symptoms persisted.  Patient wonders if this is related to Lexapro.  Patient stated that was previously treated with Ativan at night by her previous PCP approximately 10 years ago.    Patient said that she used Unisom in the past but it has caused worsening of RLS so she stopped Rx    Blood work that was performed earlier this year revealed low level of ferritin at 9.  Patient has tried iron tabs but they caused nausea so she stopped      Review of Systems   Constitutional: Negative.    HENT: Negative.     Respiratory: Negative.     Cardiovascular: Negative.    Gastrointestinal: Negative.    Genitourinary: Negative.    Musculoskeletal: Negative.    Skin: Negative.    Hematological: Negative.    Psychiatric/Behavioral:  Positive for sleep disturbance. The patient is nervous/anxious.      Past Medical History:   Diagnosis Date   • Abnormal Pap smear of cervix    • Anal fissure    • Anxiety    • Constipation    • Depression    • Headache     migranes   • Headache(784.0) 2014   • IBS (irritable bowel syndrome)    • Migraine    • Psoriasis    • Seasonal allergies    • Varicella      Past Surgical History:   Procedure Laterality Date   • COLPOSCOPY     • EYE SURGERY  2018    LASIK   • GYNECOLOGIC CRYOSURGERY     • MI COLONOSCOPY FLX DX W/COLLJ SPEC WHEN PFRMD N/A 08/24/2016    Procedure: EGD AND COLONOSCOPY;  Surgeon: Prabhakar Delgado MD;  Location: [facility];  Service: Gastroenterology   • MI EXCISION/CURETTAGE CYST/TUMOR FEMUR Left 4/24/2024    Procedure: Osteochondroma- proximal tibia- REMOVAL TUMOR BONE;  Surgeon: Sergio Singer DO;  Location: [facility];  Service: Orthopedics   • WISDOM TOOTH EXTRACTION      all  four     Family History   Problem Relation Age of Onset   • Anxiety disorder Mother    • Uterine cancer Mother 51   • Mental illness Mother         Anxiety   • Cancer Mother         Uterine cancer   • No Known Problems Father    • No Known Problems Brother    • Hypertension Maternal Grandmother    • Mental illness Maternal Grandmother    • Hyperlipidemia Maternal Grandmother    • Thyroid disease Maternal Grandmother    • Arthritis Maternal Grandmother    • Autoimmune disease Maternal Grandmother         Hashimotos thyroiditis   • Anxiety disorder Maternal Grandmother    • Scleroderma Paternal Grandmother    • Autoimmune disease Paternal Grandmother         Scleraderma   • Cancer Paternal Grandfather         multiple mylenoma    • Mental illness Paternal Grandfather    • Sjogren's syndrome Paternal Grandfather    • Hyperlipidemia Paternal Grandfather    • Thrombosis Paternal Grandfather         DVT   • Autoimmune disease Paternal Grandfather         Sjrogrena     Social History     Tobacco Use   • Smoking status: Never   • Smokeless tobacco: Never   Vaping Use   • Vaping status: Never Used   Substance and Sexual Activity   • Alcohol use: Yes     Alcohol/week: 0.0 - 1.0 standard drinks of alcohol     Comment: Socially   • Drug use: No   • Sexual activity: Yes     Partners: Male     Birth control/protection: Coitus interruptus     Current Outpatient Medications on File Prior to Visit   Medication Sig   • Atogepant (Qulipta) 60 MG TABS Take 1 tablet by mouth every morning   • azelaic acid (AZELEX) 20 % cream Apply topically 2 (two) times a day   • celecoxib (CeleBREX) 100 mg capsule Take 1 capsule (100 mg total) by mouth 2 (two) times a day for 14 days   • docusate sodium (Colace) 100 mg capsule    • escitalopram (LEXAPRO) 20 mg tablet TAKE 1 TABLET BY MOUTH EVERY DAY   • Magnesium 200 MG CHEW    • SUMAtriptan (IMITREX) 100 mg tablet Take 1 tablet (100 mg total) by mouth once as needed for migraine Okay to repeat in 2  "hours. No more than 2 in 24 hours.     Allergies   Allergen Reactions   • Celebrex [Celecoxib] Rash     Immunization History   Administered Date(s) Administered   • COVID-19 PFIZER VACCINE 0.3 ML IM 04/06/2021, 04/27/2021, 11/18/2021   • DTaP 1990, 01/11/1993, 03/11/1993, 03/28/1994, 03/26/1998   • Hep A, adult 08/03/2019, 02/03/2020   • Hep B, adult [birth year], 06/14/1993   • HiB 1990, 01/11/1993, 03/11/1993, 12/17/1993   • INFLUENZA 10/16/2011, 10/05/2018, 10/02/2019, 10/08/2020, 11/18/2021, 09/27/2022, 11/14/2023   • Influenza, injectable, quadrivalent, preservative free 0.5 mL 10/08/2020, 09/27/2022, 11/14/2023   • Influenza, seasonal, injectable 10/16/2011   • MMR 12/17/1993, 03/26/1998   • Meningococcal MCV4, Unspecified 01/13/2020   • Meningococcal MCV4P 01/13/2020   • OPV 1990, 01/11/1993, 03/28/1994, 03/26/1998   • Tdap 01/01/2015, 01/04/2021, 09/27/2022   • Typhoid, Unspecified 01/22/2020   • Varicella 03/01/1994   • Yellow Fever 12/27/2019     Objective     /60 (BP Location: Left arm, Patient Position: Sitting, Cuff Size: Standard)   Pulse 89   Temp 98.4 °F (36.9 °C) (Temporal)   Resp 16   Ht 5' 5\" (1.651 m)   Wt 57.2 kg (126 lb)   LMP 06/16/2024 (Exact Date)   SpO2 98%   BMI 20.97 kg/m²     Physical Exam  Vitals and nursing note reviewed.   Constitutional:       General: She is not in acute distress.     Appearance: Normal appearance. She is well-developed. She is not ill-appearing.   HENT:      Head: Normocephalic and atraumatic.   Eyes:      Conjunctiva/sclera: Conjunctivae normal.   Neck:      Thyroid: No thyromegaly.      Vascular: No carotid bruit.   Cardiovascular:      Rate and Rhythm: Normal rate and regular rhythm.      Heart sounds: Normal heart sounds. No murmur heard.  Pulmonary:      Effort: Pulmonary effort is normal. No respiratory distress.      Breath sounds: Normal breath sounds. No wheezing.   Abdominal:      General: Bowel sounds are normal. " There is no distension or abdominal bruit.      Palpations: Abdomen is soft.      Tenderness: There is no abdominal tenderness.      Hernia: No hernia is present.   Musculoskeletal:         General: Normal range of motion.      Cervical back: Neck supple.   Neurological:      General: No focal deficit present.      Mental Status: She is alert and oriented to person, place, and time.      Cranial Nerves: No cranial nerve deficit.      Coordination: Coordination normal.   Psychiatric:         Attention and Perception: Attention normal.         Mood and Affect: Mood normal. Affect is labile and tearful.         Behavior: Behavior normal.         Thought Content: Thought content normal.       Administrative Statements

## 2024-07-08 NOTE — ASSESSMENT & PLAN NOTE
Lexapro 20 mg daily.  Patient takes Rx at night and will gradually shifted to the morning dosing.  Chronic insomnia, possibly underlying anxiety in spite of Rx.  Follow-up in 6 to 8 weeks once sleep cycle normalizes

## 2024-07-08 NOTE — ASSESSMENT & PLAN NOTE
Intermittent symptoms of restless legs. Not daily.  Strictly at bedtime.  Previous workup included decreased level of ferritin at 9 (patient could not tolerate oral iron) and slightly decreased level of vitamin D.  No risk factors for neuropathy or vascular disease.Hemoglobin A1c was normal.Patient reports chronic insomnia and I wonder if that is the root of her problem rather than RLS.    We discussed treatment options.  She prefers to avoid gabapentin or Elavil or Pamelor.  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  She wonders if Lexapro is the cause of her symptoms, patient also addressed similar concern of Qulipta with neurologist.  Patient recalls good response to Ativan that was prescribed by her previous PCP and is requesting an Rx.    Long discussion with the patient who is being quite emotional and tearful through this conversation.  I explained her that Ativan will probably work for insomnia/anxiety but we should not be creating the pattern of regular Ativan use due to potential for dependency.  Patient understands and agrees.    We agreed to start hydroxyzine 25 to 50 mg nightly as needed.  Patient will message me with an update in a week.  If this medication is not helpful we will consider alternative, possibly trazodone.    I provided patient with the prescription for Ativan since medication was highly effective in the past and she has been struggling with insomnia for many months.  I advised her not to use it together with hydroxyzine but rather keep it on hand and use only in case if other medications are ineffective.    We discussed possibly trialing alternative medication for anxiety but agreed that we should normalize her sleep first prior to further Rx changes.    Patient will proceed with repeat blood work to retest ferritin and vitamin D.  If ferritin level remains low-we may consider iron infusions  since she could not tolerate oral iron (nausea)

## 2024-07-10 ENCOUNTER — APPOINTMENT (OUTPATIENT)
Dept: PHYSICAL THERAPY | Facility: REHABILITATION | Age: 34
End: 2024-07-10
Payer: COMMERCIAL

## 2024-07-18 ENCOUNTER — OFFICE VISIT (OUTPATIENT)
Dept: PHYSICAL THERAPY | Facility: REHABILITATION | Age: 34
End: 2024-07-18
Payer: COMMERCIAL

## 2024-07-18 DIAGNOSIS — L90.5 SCAR ADHERENT: ICD-10-CM

## 2024-07-18 DIAGNOSIS — D16.22 OSTEOCHONDROMA OF LEFT LOWER LEG: Primary | ICD-10-CM

## 2024-07-18 PROCEDURE — 97140 MANUAL THERAPY 1/> REGIONS: CPT | Performed by: PHYSICAL THERAPIST

## 2024-07-18 NOTE — PROGRESS NOTES
Daily Note     Today's date: 2024  Patient name: Alondra Blanton  : 1990  MRN: [id number]  Referring provider: Sergio Singer DO  Dx:   Encounter Diagnosis     ICD-10-CM    1. Osteochondroma of left lower leg  D16.22       2. Scar adherent  L90.5           Start Time: 1615  Stop Time: 1638  Total time in clinic (min): 23 minutes    Subjective: Reports the scar felt softer and more pliable after the first tx.      Objective: See treatment diary below      Assessment: Tolerated treatment well. Patient would benefit from continued PT      Plan: Continue per plan of care.      Precautions: anxiety/dep      POC exp =  24      Manuals            L LE scar - Laser dir contact post surg protocol DS L LE scar - Laser dir contact post surg protocol           IASTM/STM scar mob DS Cupping with sliding - DS                                     Neuro Re-Ed                                                                                                        Ther Ex                                                                                                                     Ther Activity                                       Gait Training                                       Modalities

## 2024-07-26 ENCOUNTER — OFFICE VISIT (OUTPATIENT)
Dept: PHYSICAL THERAPY | Facility: REHABILITATION | Age: 34
End: 2024-07-26
Payer: COMMERCIAL

## 2024-07-26 DIAGNOSIS — D16.22 OSTEOCHONDROMA OF LEFT LOWER LEG: Primary | ICD-10-CM

## 2024-07-26 DIAGNOSIS — L90.5 SCAR ADHERENT: ICD-10-CM

## 2024-07-26 PROCEDURE — 97140 MANUAL THERAPY 1/> REGIONS: CPT

## 2024-07-26 NOTE — PROGRESS NOTES
Daily Note     Today's date: 2024  Patient name: Alondra Blanton  : 1990  MRN: [id number]  Referring provider: Sergio Singer DO  Dx:   Encounter Diagnosis     ICD-10-CM    1. Osteochondroma of left lower leg  D16.22       2. Scar adherent  L90.5                      Subjective: Alondra stated that her scar is feeling less restricted.       Objective: See treatment diary below      Assessment: Tolerated treatment well. No palpable soft tissue adhesion, uniformity noted around the scar with MFD.       Plan: Discharged.      Precautions: anxiety/dep      POC exp =  24      Manuals           L LE scar - Laser dir contact post surg protocol DS L LE scar - Laser dir contact post surg protocol L LE scar - Laser dir contact post surg protocol          IASTM/STM scar mob DS Cupping with sliding - DS Cupping with sliding  MB                                    Neuro Re-Ed                                                                                                        Ther Ex                                                                                                                     Ther Activity                                       Gait Training                                       Modalities

## 2024-08-05 DIAGNOSIS — F51.05 INSOMNIA SECONDARY TO ANXIETY: ICD-10-CM

## 2024-08-05 DIAGNOSIS — F41.9 INSOMNIA SECONDARY TO ANXIETY: ICD-10-CM

## 2024-08-06 RX ORDER — HYDROXYZINE HYDROCHLORIDE 25 MG/1
TABLET, FILM COATED ORAL
Qty: 180 TABLET | Refills: 1 | Status: SHIPPED | OUTPATIENT
Start: 2024-08-06

## 2024-08-08 ENCOUNTER — OFFICE VISIT (OUTPATIENT)
Dept: NEUROLOGY | Facility: CLINIC | Age: 34
End: 2024-08-08
Payer: COMMERCIAL

## 2024-08-08 VITALS
BODY MASS INDEX: 21.33 KG/M2 | HEIGHT: 65 IN | SYSTOLIC BLOOD PRESSURE: 102 MMHG | WEIGHT: 128 LBS | DIASTOLIC BLOOD PRESSURE: 56 MMHG | HEART RATE: 64 BPM

## 2024-08-08 DIAGNOSIS — G43.009 MIGRAINE WITHOUT AURA AND WITHOUT STATUS MIGRAINOSUS, NOT INTRACTABLE: Primary | ICD-10-CM

## 2024-08-08 DIAGNOSIS — F32.A ANXIETY AND DEPRESSION: ICD-10-CM

## 2024-08-08 DIAGNOSIS — F41.9 ANXIETY AND DEPRESSION: ICD-10-CM

## 2024-08-08 PROCEDURE — 99213 OFFICE O/P EST LOW 20 MIN: CPT

## 2024-08-08 RX ORDER — ATOGEPANT 60 MG/1
1 TABLET ORAL EVERY MORNING
Qty: 30 TABLET | Refills: 6 | Status: SHIPPED | OUTPATIENT
Start: 2024-08-08

## 2024-08-08 NOTE — PROGRESS NOTES
[facility]  PATIENT:  Alondra Blanton  MRN:  [id number]  :  1990  DATE OF SERVICE:  2024      Assessment/Plan:     Migraine without aura without status migrainosus:    I had the pleasure of seeing Alondra today in the office at [facility] in [place].  The patient had presented today for an office visit follow-up in regard to her migraine headaches.  The patient had been previously following up with Dr. Ramirez in regard to her migraines.  The patient stated that since the last appointment she was doing fairly well with her migraines.  Only having about 2-3 significant migraine days a month.  She notes that she is still taking the Qulipta 60 mg once daily for migraine prevention.  She notes that she is also taking sumatriptan as well which seems to completely abort the headaches at their onset.  The patient is doing perfectly fine and does not want to  at this point in time.  However, she did have questions in regards to coming off of the Qulipta in the future.  She notes that within the next few months she is trying to become pregnant.  She was wondering how long she would have to be off of the Qulipta before trying to conceive.  Stated that as a good general rule of thumb it would be good to at least be off of that medication for about 4 to 6 weeks at the very minimum before hand.  Advised that if the patient was going to try to taper off of the medication, she could try taking the medication every other day for 2 weeks before completely discontinuing the Qulipta.  Advised patient that there are certainly options like magnesium and B2 supplementation or cyproheptadine during pregnancy which she could use for migraine abortive therapy.  The patient was also notified that during the first trimester she would be okay to take sumatriptan but afterwards we would have to talk about different abortive therapy as well.  Advised the patient that if she  does find that she becomes pregnant, she should contact myself or Dr. Ramirez to learn about next steps in regards to medication preventative therapy of her migraines.  Until then, the patient was free to follow-up with Dr. Ramierz in 6 months time.    Patient Instructions:    Headache Calendar  Please maintain a headache calendar  Consider using phone applications such as Migraine Juan or Migraine Diary    Headache/migraine treatment:     Rescue medications (for immediate treatment of a headache):   It is ok to take ibuprofen, acetaminophen or naproxen (Advil, Tylenol,  Aleve, Excedrin) if they help your headaches you should limit these to No more than 3 times a week to avoid medication overuse/rebound headaches.     For your more moderate to severe migraines take this medication early   - Continue sumatriptan 100 mg tabs - take one at the onset of headache. May repeat one time after 2 hours if pain has not resolved.   (Max 2 a day and 12 a month)     Prescription preventive medications for headaches/migraines   (to take every day to help prevent headaches - not to take at the time of headache):  - Continue Qulipta 60 mg once daily for migraine prevention.  When the patient starts to consider potentially becoming pregnant in the future, I would at least give about 4 to 6 weeks for the patient to completely come off of the Qulipta before trying to conceive.  Advised that for the first 2 weeks of trying to come off of the medication, she can take the medication every other day.  Although, not 100% certain if the patient does really need to taper this medication but certainly we can take every other day just to combat any potential side effects that may occur.  And then afterwards she can completely discontinue the medication altogether.  Again as previously stated, would recommend anywhere between 4 to 6 weeks of completely coming off the medication before trying to become pregnant.  Certainly there are other options  for prevention in the future, could consider magnesium and B2 supplementation or cyproheptadine during pregnancy if needed.    *Typically these types of medications take time until you see the benefit, although some may see improvement in days, often it may take weeks, especially if the medication is being titrated up to a beneficial level. Please contact us if there are any concerns or questions regarding the medication.     Over the counter preventive supplements for headaches/migraines (if you try, try for 3 months straight)  (to take every day to help prevent headaches - not to take at the time of headache):  There are combo pills online of these - none of which regulated by FDA and double check dosing - take appropriate dose only once a day- prevent a migraine, migravent, mind ease, migrelief   [] Magnesium 400mg daily (If any diarrhea or upset stomach, decrease dose  as tolerated)  [] Riboflavin (Vitamin B2) 400mg daily (may make your urine bright/neon yellow)  - All supplements can be purchased online    Lifestyle Recommendations:  [x] SLEEP - Maintain a regular sleep schedule: Adults need at least 7-8 hours of uninterrupted a night. Maintain good sleep hygiene:  Going to bed and waking up at consistent times, avoiding excessive daytime naps, avoiding caffeinated beverages in the evening, avoid excessive stimulation in the evening and generally using bed primarily for sleeping.  One hour before bedtime would recommend turning lights down lower, decreasing your activity (may read quietly, listen to music at a low volume). When you get into bed, should eliminate all technology (no texting, emailing, playing with your phone, iPad or tablet in bed).  [x] HYDRATION - Maintain good hydration.  Drink  2L of fluid a day (4 typical small water bottles)  [x] DIET - Maintain good nutrition. In particular don't skip meals and try and eat healthy balanced meals regularly.  [x] TRIGGERS - Look for other triggers and avoid  them: Limit caffeine to 1-2 cups a day or less. Avoid dietary triggers that you have noticed bring on your headaches (this could include aged cheese, peanuts, MSG, aspartame and nitrates).  [x] EXERCISE - physical exercise as we all know is good for you in many ways, and not only is good for your heart, but also is beneficial for your mental health, cognitive health and  chronic pain/headaches. I would encourage at the least 5 days of physical exercise weekly for at least 30 minutes.     Education and Follow-up  [x] Please call with any questions or concerns. Of course if any new concerning symptoms go to the emergency department.  [x] Follow up in 6 months with Dr. Ramirez      History of Present Illness:     For Review:    We had the pleasure of evaluating Alondra Blanton in neurological follow up  today for headaches.  As you know,  she is a 33 y.o.   female with a past history of migraine headaches. Patient was last seen in the office at [facility] on 02/05/2024 by Dr. Ramirez.  At the patient's previous appointment, it is noted that she was endorsing about 2 headache days per month.  She had been tolerating Qulipta 60 mg daily without any significant issues and using sumatriptan for abortive therapy.  She did report that about 1 month after starting Qulipta as she noticed her hands and feet were very warm in the evening when going to bed.  She was seen by her PCP and had a variety of labs, all normal besides iron panel for which she had been taking iron supplements.  Dr. Ramirez did not believe that the patient's symptoms were likely related to the Qulipta.  Is noted that she was to continue iron supplementation to see if this improves.  If she continues to struggle with that she would be referred to rheumatology for further evaluation.  Patient had no other issues or concerns at the last appointment.  Directed to continue Qulipta for preventative therapy and sumatriptan for abortive  therapy.      Current medical illnesses: migraines, anxiety, depression, IBS       What medications do you take or have you taken for your headaches?   Current Preventive:   Qulipta 60 mg, Lexapro 20 mg    Current Abortive:   Sumatriptan    Prior Preventive:   Celexa, Prozac, Paxil, Zoloft, Topamax (side effects), avoid antihypertensive and beta-blocker due to baseline vitals, avoid Aimovig due to constipation    Prior Abortive:   None    Interval updates as of 8/8/2024:    2-3/30 days with significant migraines a month. Taking Qulipta 60 mg once daily at this time. Takes sumatriptan 100 mg for abortive therapy, takes right at the onset of a headache and seems to be knocking the headache out completely. She is thinking about trying to become pregnant in a few months.  Patient inquiring about when she should start coming off of the Qulipta prior to trying to conceive. Hands and feet have not been feeling warm since the last visit and this issue has seemed dissipate.  Patient is not quite sure if this was related to iron supplementation or not.    Reviewed old notes from physician seen in the past- see above HPI for summary of previous encounters.       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    PONV (postoperative nausea and vomiting)    Insomnia secondary to anxiety       Medications:      Current Outpatient Medications   Medication Sig Dispense Refill    Atogepant (Qulipta) 60 MG TABS Take 1  tablet by mouth every morning 30 tablet 6    azelaic acid (AZELEX) 20 % cream Apply topically 2 (two) times a day      Cholecalciferol (Vitamin D3) 125 MCG (5000 UT) CAPS Take by mouth in the morning      docusate sodium (Colace) 100 mg capsule       escitalopram (LEXAPRO) 20 mg tablet TAKE 1 TABLET BY MOUTH EVERY DAY 30 tablet 5    hydrOXYzine HCL (ATARAX) 25 mg tablet TAKE 1-2 TABS AT BEDTIME AS NEEDED FOR INSOMNIA/ANXIETY 180 tablet 1    LORazepam (Ativan) 0.5 mg tablet Take 1 tablet (0.5 mg total) by mouth daily at bedtime as needed for anxiety 30 tablet 0    Magnesium 200 MG CHEW       SUMAtriptan (IMITREX) 100 mg tablet Take 1 tablet (100 mg total) by mouth once as needed for migraine Okay to repeat in 2 hours. No more than 2 in 24 hours. 12 tablet 6    celecoxib (CeleBREX) 100 mg capsule Take 1 capsule (100 mg total) by mouth 2 (two) times a day for 14 days 28 capsule 0     No current facility-administered medications for this visit.        Allergies:      Allergies   Allergen Reactions    Celebrex [Celecoxib] Rash       Family History:     Family History   Problem Relation Age of Onset    Anxiety disorder Mother     Uterine cancer Mother 51    Mental illness Mother         Anxiety    Cancer Mother         Uterine cancer    No Known Problems Father     No Known Problems Brother     Hypertension Maternal Grandmother     Mental illness Maternal Grandmother     Hyperlipidemia Maternal Grandmother     Thyroid disease Maternal Grandmother     Arthritis Maternal Grandmother     Autoimmune disease Maternal Grandmother         Hashimotos thyroiditis    Anxiety disorder Maternal Grandmother     Scleroderma Paternal Grandmother     Autoimmune disease Paternal Grandmother         Scleraderma    Cancer Paternal Grandfather         multiple mylenoma     Mental illness Paternal Grandfather     Sjogren's syndrome Paternal Grandfather     Hyperlipidemia Paternal Grandfather     Thrombosis Paternal Grandfather         DVT     "Autoimmune disease Paternal Grandfather         [place]       Social History:     Social History     Socioeconomic History    Marital status: /Civil Union     Spouse name: Venkata Blanton    Number of children: Not on file    Years of education: Not on file    Highest education level: Bachelor's degree (e.g., BA, AB, BS)   Occupational History    Occupation: RN   Tobacco Use    Smoking status: Never    Smokeless tobacco: Never   Vaping Use    Vaping status: Never Used   Substance and Sexual Activity    Alcohol use: Yes     Alcohol/week: 0.0 - 1.0 standard drinks of alcohol     Comment: Socially    Drug use: No    Sexual activity: Yes     Partners: Male     Birth control/protection: Coitus interruptus   Other Topics Concern    Not on file   Social History Narrative    Always uses seatbelts    Caffeine use     Social Determinants of Health     Financial Resource Strain: Not on file   Food Insecurity: Not on file   Transportation Needs: Not on file   Physical Activity: Not on file   Stress: Not on file   Social Connections: Unknown (6/18/2024)    Received from [employer]    Social Connections     How often do you feel lonely or isolated from those around you? (Adult - for ages 18 years and over): Not on file   Intimate Partner Violence: Not on file   Housing Stability: Not on file         Objective:     Physical Exam:                                                                 Vitals:            Constitutional:    /56 (BP Location: Left arm, Patient Position: Sitting, Cuff Size: Standard)   Pulse 64   Ht 5' 5\" (1.651 m)   Wt 58.1 kg (128 lb)   BMI 21.30 kg/m²   BP Readings from Last 3 Encounters:   08/08/24 102/56   07/08/24 100/60   06/25/24 98/60     Pulse Readings from Last 3 Encounters:   08/08/24 64   07/08/24 89   06/06/24 69         Well developed, well nourished, well groomed. No dysmorphic features.       Psychiatric:  Normal behavior and appropriate affect        Neurological Examination: "     Mental status/cognitive function:   Orientated to time, place and person. Recent and remote memory intact. Attention span and concentration as well as fund of knowledge are appropriate for age. Normal language and spontaneous speech.     Cranial Nerves:  II-visual fields full.   III, IV, VI-Pupils were equal, round, and reactive to light and accomodation. Extraocular movements were full and conjugate without nystagmus. Conjugate gaze, normal smooth pursuits, normal saccades   V-facial sensation symmetric.    VII-facial expression symmetric, intact forehead wrinkle, strong eye closure, symmetric smile    VIII-hearing grossly intact bilaterally   IX, X-palate elevation symmetric, no dysarthria.   XI-shoulder shrug strength intact    XII-tongue protrusion midline.    Motor Exam: symmetric bulk and tone throughout, no pronator drift. Power/strength 5/5 bilateral upper and lower extremities, no atrophy, fasciculations or abnormal movements noted.   Sensory: grossly intact light touch in all extremities.   Reflexes: brachioradialis 2+, biceps 2+, knee 2+ bilaterally  Coordination: Finger nose finger intact bilaterally, no apparent dysmetria, ataxia or tremor noted  Gait: steady casual and tandem gait.       Review of Systems:     Review of Systems   Constitutional:  Negative for appetite change, fatigue and fever.   HENT: Negative.  Negative for hearing loss, tinnitus, trouble swallowing and voice change.    Eyes: Negative.  Negative for photophobia, pain and visual disturbance.   Respiratory: Negative.  Negative for shortness of breath.    Cardiovascular: Negative.  Negative for palpitations.   Gastrointestinal: Negative.  Negative for nausea and vomiting.   Endocrine: Negative.  Negative for cold intolerance.   Genitourinary: Negative.  Negative for dysuria, frequency and urgency.   Musculoskeletal:  Negative for back pain, gait problem, myalgias, neck pain and neck stiffness.   Skin: Negative.  Negative for rash.    Allergic/Immunologic: Negative.    Neurological:  Positive for headaches (Quilipta has been working - 2 to 3 a month). Negative for dizziness, tremors, seizures, syncope, facial asymmetry, speech difficulty, weakness, light-headedness and numbness.   Hematological: Negative.  Does not bruise/bleed easily.   Psychiatric/Behavioral: Negative.  Negative for confusion, hallucinations and sleep disturbance.      I personally reviewed the ROS entered by the MA    I spent 15 minutes in total time for this visit.    Author:  Gerardo Mcconnell PA-C 8/8/2024 1:35 PM

## 2024-08-08 NOTE — PROGRESS NOTES
Review of Systems   Constitutional:  Negative for appetite change, fatigue and fever.   HENT: Negative.  Negative for hearing loss, tinnitus, trouble swallowing and voice change.    Eyes: Negative.  Negative for photophobia, pain and visual disturbance.   Respiratory: Negative.  Negative for shortness of breath.    Cardiovascular: Negative.  Negative for palpitations.   Gastrointestinal: Negative.  Negative for nausea and vomiting.   Endocrine: Negative.  Negative for cold intolerance.   Genitourinary: Negative.  Negative for dysuria, frequency and urgency.   Musculoskeletal:  Negative for back pain, gait problem, myalgias, neck pain and neck stiffness.   Skin: Negative.  Negative for rash.   Allergic/Immunologic: Negative.    Neurological:  Positive for headaches (ZkatteriliAblynx has been working - 2 to 3 a month). Negative for dizziness, tremors, seizures, syncope, facial asymmetry, speech difficulty, weakness, light-headedness and numbness.   Hematological: Negative.  Does not bruise/bleed easily.   Psychiatric/Behavioral: Negative.  Negative for confusion, hallucinations and sleep disturbance.

## 2024-08-08 NOTE — PATIENT INSTRUCTIONS
Patient Instructions:    Headache Calendar  Please maintain a headache calendar  Consider using phone applications such as Migraine Juan or Migraine Diary    Headache/migraine treatment:     Rescue medications (for immediate treatment of a headache):   It is ok to take ibuprofen, acetaminophen or naproxen (Advil, Tylenol,  Aleve, Excedrin) if they help your headaches you should limit these to No more than 3 times a week to avoid medication overuse/rebound headaches.     For your more moderate to severe migraines take this medication early   - Continue sumatriptan 100 mg tabs - take one at the onset of headache. May repeat one time after 2 hours if pain has not resolved.   (Max 2 a day and 12 a month)     Prescription preventive medications for headaches/migraines   (to take every day to help prevent headaches - not to take at the time of headache):  - Continue Qulipta 60 mg once daily for migraine prevention.  When the patient starts to consider potentially becoming pregnant in the future, I would at least give about 4 to 6 weeks for the patient to completely come off of the Qulipta before trying to conceive.  Advised that for the first 2 weeks of trying to come off of the medication, she can take the medication every other day.  Although, not 100% certain if the patient does really need to taper this medication but certainly we can take every other day just to combat any potential side effects that may occur.  And then afterwards she can completely discontinue the medication altogether.  Again as previously stated, would recommend anywhere between 4 to 6 weeks of completely coming off the medication before trying to become pregnant.  Certainly there are other options for prevention in the future, could consider magnesium and B2 supplementation or cyproheptadine during pregnancy if needed.    *Typically these types of medications take time until you see the benefit, although some may see improvement in days, often  it may take weeks, especially if the medication is being titrated up to a beneficial level. Please contact us if there are any concerns or questions regarding the medication.     Over the counter preventive supplements for headaches/migraines (if you try, try for 3 months straight)  (to take every day to help prevent headaches - not to take at the time of headache):  There are combo pills online of these - none of which regulated by FDA and double check dosing - take appropriate dose only once a day- prevent a migraine, migravent, mind ease, migrelief   [] Magnesium 400mg daily (If any diarrhea or upset stomach, decrease dose  as tolerated)  [] Riboflavin (Vitamin B2) 400mg daily (may make your urine bright/neon yellow)  - All supplements can be purchased online    Lifestyle Recommendations:  [x] SLEEP - Maintain a regular sleep schedule: Adults need at least 7-8 hours of uninterrupted a night. Maintain good sleep hygiene:  Going to bed and waking up at consistent times, avoiding excessive daytime naps, avoiding caffeinated beverages in the evening, avoid excessive stimulation in the evening and generally using bed primarily for sleeping.  One hour before bedtime would recommend turning lights down lower, decreasing your activity (may read quietly, listen to music at a low volume). When you get into bed, should eliminate all technology (no texting, emailing, playing with your phone, iPad or tablet in bed).  [x] HYDRATION - Maintain good hydration.  Drink  2L of fluid a day (4 typical small water bottles)  [x] DIET - Maintain good nutrition. In particular don't skip meals and try and eat healthy balanced meals regularly.  [x] TRIGGERS - Look for other triggers and avoid them: Limit caffeine to 1-2 cups a day or less. Avoid dietary triggers that you have noticed bring on your headaches (this could include aged cheese, peanuts, MSG, aspartame and nitrates).  [x] EXERCISE - physical exercise as we all know is good for  you in many ways, and not only is good for your heart, but also is beneficial for your mental health, cognitive health and  chronic pain/headaches. I would encourage at the least 5 days of physical exercise weekly for at least 30 minutes.     Education and Follow-up  [x] Please call with any questions or concerns. Of course if any new concerning symptoms go to the emergency department.  [x] Follow up in 6 months with Dr. Ramirez

## 2024-10-12 DIAGNOSIS — G43.009 MIGRAINE WITHOUT AURA AND WITHOUT STATUS MIGRAINOSUS, NOT INTRACTABLE: ICD-10-CM

## 2024-10-16 RX ORDER — SUMATRIPTAN 100 MG/1
100 TABLET, FILM COATED ORAL ONCE AS NEEDED
Qty: 12 TABLET | Refills: 5 | Status: SHIPPED | OUTPATIENT
Start: 2024-10-16

## 2024-10-23 ENCOUNTER — TELEPHONE (OUTPATIENT)
Dept: NEUROLOGY | Facility: CLINIC | Age: 34
End: 2024-10-23

## 2024-10-23 NOTE — TELEPHONE ENCOUNTER
LMOM for pt to r/s their 2/11 appt as Dr. Ramirez will no longer be in the office at that time. Gave pt c/b #. Call center pt can go in any slot that week, just confirm which slot they want and I will reach out to override if needed.

## 2024-11-01 NOTE — TELEPHONE ENCOUNTER
Patient called back; accepted 2/10/25 2 PM.  Candido aware and submitting override request.    Thank you

## 2024-11-15 ENCOUNTER — TELEPHONE (OUTPATIENT)
Age: 34
End: 2024-11-15

## 2024-11-15 NOTE — TELEPHONE ENCOUNTER
Patient states she has been without this medication for a month and is now being informed that medication needs a prior auth. Patient requesting auth to be processed urgently and would like a call once determination is received.     Reason for call:   [x] Prior Auth  [] Other:     Caller:  [x] Patient  [] Pharmacy  Name:   Address:   Callback Number:     Medication: Atogepant (Qulipta) 60 MG     Dose/Frequency: Take 1 tablet by mouth every morning     Quantity: 30    Ordering Provider:   [] PCP/Provider -   [x] Speciality/Provider - Neuro

## 2024-11-19 NOTE — TELEPHONE ENCOUNTER
qulipta approved through 11/17/2025; approval letter scanned to media; called patient to advise, reached vm, left detailed message (consent on file).

## 2024-12-02 ENCOUNTER — TELEPHONE (OUTPATIENT)
Dept: NEUROLOGY | Facility: CLINIC | Age: 34
End: 2024-12-02

## 2024-12-02 NOTE — TELEPHONE ENCOUNTER
LMOM for pt to inform them that their appt on 2/10 the location was changed to . Gave pt the address. We can r/s pt to a date in [facility] if they prefer, call center reach out to me if needed for r/s. Or they are okay to be put as a VV for this visit and next appt can be in-person at the preferred location. Gave pt c/b #.

## 2025-02-10 ENCOUNTER — TELEMEDICINE (OUTPATIENT)
Dept: NEUROLOGY | Facility: CLINIC | Age: 35
End: 2025-02-10
Payer: COMMERCIAL

## 2025-02-10 DIAGNOSIS — F41.9 ANXIETY AND DEPRESSION: ICD-10-CM

## 2025-02-10 DIAGNOSIS — G43.009 MIGRAINE WITHOUT AURA AND WITHOUT STATUS MIGRAINOSUS, NOT INTRACTABLE: Primary | ICD-10-CM

## 2025-02-10 DIAGNOSIS — F32.A ANXIETY AND DEPRESSION: ICD-10-CM

## 2025-02-10 PROCEDURE — 99213 OFFICE O/P EST LOW 20 MIN: CPT | Performed by: STUDENT IN AN ORGANIZED HEALTH CARE EDUCATION/TRAINING PROGRAM

## 2025-02-10 RX ORDER — SERTRALINE HYDROCHLORIDE 100 MG/1
100 TABLET, FILM COATED ORAL EVERY MORNING
COMMUNITY
Start: 2025-02-06

## 2025-02-10 NOTE — PROGRESS NOTES
Since your last visit are your headaches - Remained the Same    Any change to the headache type? No     What is your current headache frequency (total headache days out of 30): 2-4/30 (of those, how many are more severe: 0)    Are you taking your current medications as prescribed? yes    Do you have any side effects? no    How may days per week do you take an abortive medicine? 0-1/7

## 2025-02-10 NOTE — ASSESSMENT & PLAN NOTE
At today's visit, she reports that she is doing well.  She endorses a few migraine days per month, but is overall happy with Qulipta 60 mg daily.  From an abortive standpoint, sumatriptan is effective if needed.  She has not yet made a determination on pregnancy so she has not stopped any medications at this time.  I have asked her to keep us updated as I would typically like to stop Qulipta at least 2 to 3 months in advance of a planned pregnancy.

## 2025-02-10 NOTE — PATIENT INSTRUCTIONS
Headache Calendar  Please maintain a headache calendar  Consider using phone applications such as Migraine Juan or [employer] Migraine Tracker     Headache/migraine treatment:   Acute medications (for immediate treatment of a headache):   It is ok to take ibuprofen, acetaminophen or naproxen (Advil, Tylenol,  Aleve, Excedrin) if they help your headaches you should limit these to No more than 2-3 times a week to avoid medication overuse/rebound headaches.      For your more moderate to severe migraines take this medication early  Imitrex (Sumatriptan) 100mg tabs - take one at the onset of headache. May repeat one time after 2 hours if pain has not resolved.   (Max 2 a day and 10 a month)      Prescription preventive medications for headaches/migraines   Qulipta - 60mg daily     Lifestyle Recommendations:  [x] SLEEP - Maintain a regular sleep schedule: Adults need at least 7-8 hours of uninterrupted a night. Maintain good sleep hygiene:  Going to bed and waking up at consistent times, avoiding excessive daytime naps, avoiding caffeinated beverages in the evening, avoid excessive stimulation in the evening and generally using bed primarily for sleeping.  One hour before bedtime would recommend turning lights down lower, decreasing your activity (may read quietly, listen to music at a low volume). When you get into bed, should eliminate all technology (no texting, emailing, playing with your phone, iPad or tablet in bed).  [x] HYDRATION - Maintain good hydration.  Drink  2L of fluid a day (4 typical small water bottles)  [x] DIET - Maintain good nutrition. In particular don't skip meals and try and eat healthy balanced meals regularly.  [x] TRIGGERS - Look for other triggers and avoid them: Limit caffeine to 1-2 cups a day or less. Avoid dietary triggers that you have noticed bring on your headaches (this could include aged cheese, peanuts, MSG, aspartame and nitrates).  [x] EXERCISE - physical exercise as we all know is  good for you in many ways, and not only is good for your heart, but also is beneficial for your mental health, cognitive health and  chronic pain/headaches. I would encourage at the least 5 days of physical exercise weekly for at least 30 minutes.      Education and Follow-up  [x] Please call with any questions or concerns. Of course if any new concerning symptoms go to the emergency department.  [x] Follow up in 8 months with Del[facility]

## 2025-02-10 NOTE — PROGRESS NOTES
Neurology Ambulatory Visit  Name: Alondra Blanton       : 1990       MRN: [id number]   Encounter Provider: Jd Ramirez DO   Encounter Date: 2/10/2025  Encounter department: NEUROLOGY [facility]    Administrative Statements   Encounter provider Jd Ramirez DO    The Patient is located at Home and in the following state in which I hold an active license [place].    The patient was identified by name and date of birth. Alondra Blanton was informed that this is a telemedicine visit and that the visit is being conducted through the Epic Embedded platform. She agrees to proceed..  My office door was closed. No one else was in the room.  She acknowledged consent and understanding of privacy and security of the video platform. The patient has agreed to participate and understands they can discontinue the visit at any time.    Alondra Blanton is a delightful 34 y.o. female with a past medical history that includes migraines, anxiety, depression, IBS here for f/u evaluation of headache.     Assessment and Plan  1. Migraine without aura and without status migrainosus, not intractable  Assessment & Plan:  At today's visit, she reports that she is doing well.  She endorses a few migraine days per month, but is overall happy with Qulipta 60 mg daily.  From an abortive standpoint, sumatriptan is effective if needed.  She has not yet made a determination on pregnancy so she has not stopped any medications at this time.  I have asked her to keep us updated as I would typically like to stop Qulipta at least 2 to 3 months in advance of a planned pregnancy.  2. Anxiety and depression    She will Return in about 8 months (around 10/10/2025).    Workup  - Neurologic assessment reveals unremarkable neurological exam.  - With no new or concerning symptoms, no red flags and an unremarkable neurologic exam, there is no specific indication for further evaluation with MRI brain.  However, this could be obtained at any time if  indicated.     Preventative:  - we discussed headache hygiene and lifestyle factors that may improve headaches  - Qulipta 60mg  - Currently on through other providers: Sertraline  - Past/ failed/contraindicated: Celexa, Prozac, Paxil, Zoloft, Topamax (side effects), avoid anti-HTN and BB due to baseline vitals, avoid Aimovig due to constipation, Lexapro  - future options: Memantine, CGRP med, botox     Acute:  - discussed not taking over-the-counter or prescription pain medications more than 3 days per week to prevent medication overuse/rebound headache  - Sumatriptan 100mg  - Currently on through other providers: None  - Past/ failed/contraindicated: None  - future options:  Triptan, prochlorperazine, Toradol IM or p.o., could consider trial of 5 days of Depakote 500 mg nightly or dexamethasone 2 mg daily for prolonged migraine, ubrelvy, reyvow, nurtec    History of Present Illness       Interval updates:  2/10/25: Since our last visit, she was seen by Delmar in August 2024.  At that time, she reported that she was doing fairly well with Qulipta and using sumatriptan as needed for abortive management.  She was planning on pregnancy in the following few months so he had recommended tapering off Qulipta if that was ultimately that plan At today's visit, she reports no significant change to her headaches.  She continues to do fairly well and endorses about 2-4 headache days per month of which none are severe.  She continues with Qulipta 60 mg daily at this time.  From an abortive standpoint, sumatriptan tends to work well.  With regards to pregnancy, they are still deciding.     Prior History  2/5/24: At today's visit, she reports overall improvement in terms of her headaches.  Currently endorses about 2 headache days per month. She is taking Qulipta daily without any significant issues. From an abortive standpoint she is tolerating Sumatriptan without any issues.   8/2/23: Ms. Blanton reports a history of migraines since  she was about 24 years old.  She was previously treated by neurology, but has not been seen in the last 1.5 years.  Of note, she was also pregnant last year which limited treatment options.  We discussed a variety of potential options at today's visit, but she was interested in trying Qulipta for prevention.  From an abortive standpoint, sumatriptan tends to work very well for her so I will have her continue with that.  She is considering having another child in the future, so I have asked her to keep me updated with regards to this as I would ideally like to take her off of the Qulipta before that.  I have asked her to keep me updated going forward and let me know if she has any issues or concerns.          Objective     Physical Exam:                                                               Vitals:            Constitutional:  There were no vitals taken for this visit.  BP Readings from Last 3 Encounters:   08/08/24 102/56   07/08/24 100/60   06/25/24 98/60     Pulse Readings from Last 3 Encounters:   08/08/24 64   07/08/24 89   06/06/24 69         Well developed, well nourished, No dysmorphic features.       HEENT:  Normocephalic atraumatic. See neuro exam   Psychiatric:  Normal behavior and appropriate affect       Neurological Examination:     Mental status/cognitive function:   Recent and remote memory appear intact. Attention span and concentration as well as fund of knowledge appear appropriate for age. Normal language and spontaneous speech.  Cranial Nerves:  III, IV, VI-Pupils were equal, round. Extraocular movements appear full and conjugate   VII-facial expression symmetric  Motor Exam: symmetric bulk throughout. no atrophy, fasciculations or abnormal movements noted.   Coordination:  no apparent dysmetria, ataxia or tremor noted    I have spent 10 minutes with the patient today in which greater than 50% of this time was spent in counseling/coordination of care regarding Prognosis, Risks and benefits  of tx options, Patient and family education, Importance of tx compliance, Impressions, Documenting in the medical record, and Obtaining or reviewing history  . I also spent 15 minutes non face to face for this patient the same day.       Voice recognition software was used in the generation of this note. There may be unintentional errors including grammatical errors, spelling errors, or pronoun errors.

## 2025-04-08 ENCOUNTER — TELEPHONE (OUTPATIENT)
Age: 35
End: 2025-04-08

## 2025-04-08 NOTE — TELEPHONE ENCOUNTER
spoke to pt to r/s and/or change 4/14/2025 appt to a resident, pt stated she was driving and will call back)

## 2025-04-16 ENCOUNTER — OFFICE VISIT (OUTPATIENT)
Age: 35
End: 2025-04-16
Payer: COMMERCIAL

## 2025-04-16 VITALS
SYSTOLIC BLOOD PRESSURE: 118 MMHG | WEIGHT: 126 LBS | DIASTOLIC BLOOD PRESSURE: 64 MMHG | BODY MASS INDEX: 20.25 KG/M2 | HEIGHT: 66 IN | TEMPERATURE: 97.8 F | HEART RATE: 67 BPM | OXYGEN SATURATION: 99 %

## 2025-04-16 DIAGNOSIS — F41.8 ANXIETY WITH DEPRESSION: ICD-10-CM

## 2025-04-16 DIAGNOSIS — G43.709 CHRONIC MIGRAINE WITHOUT AURA WITHOUT STATUS MIGRAINOSUS, NOT INTRACTABLE: ICD-10-CM

## 2025-04-16 DIAGNOSIS — E55.9 VITAMIN D DEFICIENCY: ICD-10-CM

## 2025-04-16 DIAGNOSIS — M79.89 SWELLING OF BOTH HANDS: Primary | ICD-10-CM

## 2025-04-16 DIAGNOSIS — R79.0 LOW FERRITIN LEVEL: ICD-10-CM

## 2025-04-16 DIAGNOSIS — E61.1 IRON DEFICIENCY: ICD-10-CM

## 2025-04-16 DIAGNOSIS — L40.9 PSORIASIS: ICD-10-CM

## 2025-04-16 DIAGNOSIS — F41.9 INSOMNIA SECONDARY TO ANXIETY: ICD-10-CM

## 2025-04-16 DIAGNOSIS — M25.40 PAINFUL SWELLING OF JOINT: ICD-10-CM

## 2025-04-16 DIAGNOSIS — R68.89 COLD INTOLERANCE OF HAND: ICD-10-CM

## 2025-04-16 DIAGNOSIS — F51.05 INSOMNIA SECONDARY TO ANXIETY: ICD-10-CM

## 2025-04-16 PROCEDURE — 99213 OFFICE O/P EST LOW 20 MIN: CPT

## 2025-04-16 RX ORDER — LORAZEPAM 0.5 MG/1
0.5 TABLET ORAL
Qty: 10 TABLET | Refills: 0 | Status: SHIPPED | OUTPATIENT
Start: 2025-04-16

## 2025-04-16 RX ORDER — FERROUS SULFATE 324(65)MG
324 TABLET, DELAYED RELEASE (ENTERIC COATED) ORAL
Qty: 30 TABLET | Refills: 0 | Status: SHIPPED | OUTPATIENT
Start: 2025-04-16

## 2025-04-16 RX ORDER — DAPSONE 75 MG/G
GEL TOPICAL
COMMUNITY
Start: 2025-01-22 | End: 2025-04-16 | Stop reason: SDDI

## 2025-04-16 NOTE — PROGRESS NOTES
Name: Alondra Blanton      : 1990      MRN: [id number]  Encounter Provider: Rosa Jenkins MD  Encounter Date: 2025   [facility]  :  Assessment & Plan  Swelling of both hands  Discussed with patient that swelling exacerbated at night while she is in bed could be due to positional fluid re-distrubution while laying flat. Will rule out other potential causes, including inflammatory, autoimmune, cardiac, nephrotic, endocrine related causes with appropriate serology, and review at next visit in 4 weeks. Discussed with patient who expresses understanding.    Plan:  - f/u with serology testing  - UA with microscopy  - EKG  - ECHO  - RTC in 4 weeks  Orders:    TSH + Free T4; Future    Comprehensive metabolic panel; Future    CBC and differential; Future    Sedimentation rate, automated; Future    ASHISH Screen w/Reflex Cascade; Future    Urinalysis with microscopic; Future    ECG 12 lead; Future    Echo complete w/ contrast if indicated; Future    Insomnia secondary to anxiety  Patient requesting refill of ativan 0.5mg tablet HS prn. Will request attending physician to refill few tablets. Advised pt not to take every night as to avoid dependency.  Orders:    LORazepam (Ativan) 0.5 mg tablet; Take 1 tablet (0.5 mg total) by mouth daily at bedtime as needed for anxiety    Anxiety with depression  Depression Screening Follow-up Plan: Patient's depression screening was positive with a PHQ-9 score of 12. Patient with underlying depression and was advised to continue current medications as prescribed. Continue regular follow-up with their psychologist/therapist/psychiatrist who is managing their mental health condition(s). Patient advised to follow-up with PCP for further management.        Chronic migraine without aura without status migrainosus, not intractable  Plan to continue current medication regimen as migraines are well-controlled.       Iron deficiency  Will order iron  supplementation for patient, notes she previously tried taking on an empty stomach in the past but self-discontinued due to GI upse. Advised patient to take supplementation daily with breakfast and source of vitamin C such as orange juice to improve absorption. Patient expresses understanding.  Orders:    ferrous sulfate 324 (65 Fe) mg; Take 1 tablet (324 mg total) by mouth daily with breakfast    Psoriasis  No acute flares at this time. States she uses Vaseline and topical ointments as needed for symptom relief. Well-controlled.              Depression Screening and Follow-up Plan:   Patient with underlying depression and was advised to continue current medications as prescribed.       History of Present Illness   Patient is a 34-year-old female with a past medical history of migraine, psoriasis, iron deficiency, vitamin D deficiency, restless leg syndrome, and anxiety with depression who presents to clinic to establish care. Patient states that she takes Qulipta for migraine prevention and Sumatriptan as abortive treatment, and notes that her current regimen provides symptomatic relief; as she previously experienced 15 migraines/month and now experiences 2-4 migraines/month. She reports taking Zoloft for anxiety and depression, and notes that she feels as though both are well-controlled and follows up with a virtual provider once a month. Patient was diagnosed with insomnia after her second pregnancy 2 years ago, and recalls trialing Unisom, Atarax, Benadryl, and supplements with insufficient relief. She recalls that she was started on Ativan which seemed to help. Patient notes that she has been experiencing some intermittent issues during sleep for the last 1.5 years, and describes that when she lays down, her hands and feet became very hot and swollen to the point where it was uncomfortable and she would have to take off her wedding ring. She notes that for the last few months, her feet have been getting swollen  "and very cold at night, to the point where she has to get up and soak them in warm water for relief. Patient is unsure of any skin color changes during these episodes. She states that she does not ever notice symptoms during the day and denies any associated chest pain, difficulty breathing, palpitations, shortness of breath, diaphoresis, or other associated symptoms. Patient notes that she was diagnosed psoriasis 4 years ago, and questions whether her symptoms could be due to to an associated arthritis, or potentially Raynaud's phenomenon.    Social history:  Patient reports that she is , and has 2 sons aged 2 and 4 years old. 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 She denies any drug or tobacco use, and reports socially drinking approximately 1-2 drinks a month.      Review of Systems   Constitutional:  Negative for chills, fatigue and fever.   HENT:  Negative for congestion, rhinorrhea and sore throat.    Eyes:  Negative for visual disturbance.   Respiratory:  Negative for cough, chest tightness and shortness of breath.    Cardiovascular:  Negative for chest pain, palpitations and leg swelling.   Gastrointestinal:  Negative for abdominal pain.   Endocrine: Positive for cold intolerance and heat intolerance.   Musculoskeletal:  Positive for joint swelling.        + Swelling in hands, + Swelling in feet   Neurological:  Negative for dizziness, weakness and light-headedness.   Psychiatric/Behavioral:  Positive for sleep disturbance. Negative for dysphoric mood and suicidal ideas. The patient is not nervous/anxious.    All other systems reviewed and are negative.    Objective   /64   Pulse 67   Temp 97.8 °F (36.6 °C)   Ht 5' 6\" (1.676 m)   Wt 57.2 kg (126 lb)   SpO2 99%   BMI 20.34 kg/m²      Physical Exam  Vitals reviewed.   Constitutional:       Appearance: Normal appearance.      Comments: Body mass index is 20.34 kg/m².   HENT:      Head: " Normocephalic and atraumatic.      Right Ear: Tympanic membrane, ear canal and external ear normal.      Left Ear: Tympanic membrane, ear canal and external ear normal.      Nose: Nose normal. No congestion.      Mouth/Throat:      Mouth: Mucous membranes are moist.      Pharynx: Oropharynx is clear.   Eyes:      General: No scleral icterus.     Conjunctiva/sclera: Conjunctivae normal.      Pupils: Pupils are equal, round, and reactive to light.   Neck:      Thyroid: No thyromegaly.   Cardiovascular:      Rate and Rhythm: Normal rate and regular rhythm.      Pulses: Normal pulses.   Pulmonary:      Effort: Pulmonary effort is normal. No respiratory distress.      Breath sounds: Normal breath sounds. No wheezing.   Abdominal:      General: Abdomen is flat. There is no distension.      Palpations: Abdomen is soft.      Tenderness: There is no abdominal tenderness.   Musculoskeletal:      Cervical back: Neck supple.   Skin:     General: Skin is warm and dry.      Capillary Refill: Capillary refill takes less than 2 seconds.      Coloration: Skin is not pale.      Findings: No erythema or rash.   Neurological:      General: No focal deficit present.      Mental Status: She is alert.      Motor: No weakness.   Psychiatric:         Mood and Affect: Mood normal.         Behavior: Behavior normal.         Thought Content: Thought content normal.         Judgment: Judgment normal.

## 2025-04-16 NOTE — ASSESSMENT & PLAN NOTE
Depression Screening Follow-up Plan: Patient's depression screening was positive with a PHQ-9 score of 12. Patient with underlying depression and was advised to continue current medications as prescribed. Continue regular follow-up with their psychologist/therapist/psychiatrist who is managing their mental health condition(s). Patient advised to follow-up with PCP for further management.

## 2025-04-16 NOTE — ASSESSMENT & PLAN NOTE
Patient requesting refill of ativan 0.5mg tablet HS prn. Will request attending physician to refill few tablets. Advised pt not to take every night as to avoid dependency.  Orders:    LORazepam (Ativan) 0.5 mg tablet; Take 1 tablet (0.5 mg total) by mouth daily at bedtime as needed for anxiety

## 2025-05-08 ENCOUNTER — APPOINTMENT (OUTPATIENT)
Dept: LAB | Facility: CLINIC | Age: 35
End: 2025-05-08
Payer: COMMERCIAL

## 2025-05-08 DIAGNOSIS — G25.81 RLS (RESTLESS LEGS SYNDROME): ICD-10-CM

## 2025-05-08 DIAGNOSIS — R68.89 COLD INTOLERANCE OF HAND: ICD-10-CM

## 2025-05-08 DIAGNOSIS — M25.40 PAINFUL SWELLING OF JOINT: ICD-10-CM

## 2025-05-08 DIAGNOSIS — M79.89 SWELLING OF BOTH HANDS: ICD-10-CM

## 2025-05-08 LAB
25(OH)D3 SERPL-MCNC: 28.7 NG/ML (ref 30–100)
FERRITIN SERPL-MCNC: 10 NG/ML (ref 30–307)
IRON SATN MFR SERPL: 19 % (ref 15–50)
IRON SERPL-MCNC: 86 UG/DL (ref 50–212)
TIBC SERPL-MCNC: 457.8 UG/DL (ref 250–450)
TRANSFERRIN SERPL-MCNC: 327 MG/DL (ref 203–362)
UIBC SERPL-MCNC: 372 UG/DL (ref 155–355)

## 2025-05-08 PROCEDURE — 83540 ASSAY OF IRON: CPT

## 2025-05-08 PROCEDURE — 82306 VITAMIN D 25 HYDROXY: CPT

## 2025-05-08 PROCEDURE — 83550 IRON BINDING TEST: CPT

## 2025-05-08 PROCEDURE — 36415 COLL VENOUS BLD VENIPUNCTURE: CPT

## 2025-05-08 PROCEDURE — 82728 ASSAY OF FERRITIN: CPT

## 2025-05-13 ENCOUNTER — TELEPHONE (OUTPATIENT)
Age: 35
End: 2025-05-13

## 2025-05-13 NOTE — TELEPHONE ENCOUNTER
Patient called back and rescheduled appointment. She wanted to make sure the office received her lab results from [facility] lab. I did not see them posted. Please reach out to [facility] to obtain results if not received. Thank you

## 2025-05-14 ENCOUNTER — HOSPITAL ENCOUNTER (OUTPATIENT)
Dept: NON INVASIVE DIAGNOSTICS | Facility: HOSPITAL | Age: 35
Discharge: HOME | End: 2025-05-14
Payer: COMMERCIAL

## 2025-05-14 ENCOUNTER — RESULTS FOLLOW-UP (OUTPATIENT)
Age: 35
End: 2025-05-14

## 2025-05-14 VITALS
WEIGHT: 126 LBS | HEIGHT: 66 IN | BODY MASS INDEX: 20.25 KG/M2 | DIASTOLIC BLOOD PRESSURE: 64 MMHG | HEART RATE: 67 BPM | SYSTOLIC BLOOD PRESSURE: 118 MMHG

## 2025-05-14 DIAGNOSIS — M79.89 SWELLING OF BOTH HANDS: ICD-10-CM

## 2025-05-14 DIAGNOSIS — M25.40 PAINFUL SWELLING OF JOINT: ICD-10-CM

## 2025-05-14 LAB
AORTIC ROOT: 2.8 CM
ASCENDING AORTA: 2.4 CM
BSA FOR ECHO PROCEDURE: 1.64 M2
E WAVE DECELERATION TIME: 161 MS
E/A RATIO: 1.29
FRACTIONAL SHORTENING: 31 (ref 28–44)
INTERVENTRICULAR SEPTUM IN DIASTOLE (PARASTERNAL SHORT AXIS VIEW): 0.7 CM
INTERVENTRICULAR SEPTUM: 0.7 CM (ref 0.6–1.1)
LAAS-AP2: 6 CM2
LAAS-AP4: 9.5 CM2
LEFT ATRIUM SIZE: 2.9 CM
LEFT ATRIUM VOLUME (MOD BIPLANE): 16 ML
LEFT ATRIUM VOLUME INDEX (MOD BIPLANE): 9.8 ML/M2
LEFT INTERNAL DIMENSION IN SYSTOLE: 3.3 CM (ref 2.1–4)
LEFT VENTRICULAR INTERNAL DIMENSION IN DIASTOLE: 4.8 CM (ref 3.5–6)
LEFT VENTRICULAR POSTERIOR WALL IN END DIASTOLE: 0.6 CM
LEFT VENTRICULAR STROKE VOLUME: 64 ML
LVSV (TEICH): 64 ML
MV E'TISSUE VEL-SEP: 12 CM/S
MV PEAK A VEL: 0.65 M/S
MV PEAK E VEL: 84 CM/S
MV STENOSIS PRESSURE HALF TIME: 47 MS
MV VALVE AREA P 1/2 METHOD: 4.68
RA PRESSURE ESTIMATED: 3 MMHG
RIGHT ATRIUM AREA SYSTOLE A4C: 7.8 CM2
RIGHT VENTRICLE ID DIMENSION: 2.9 CM
RV PSP: 21 MMHG
SL CV LEFT ATRIUM LENGTH A2C: 2.8 CM
SL CV LV EF: 60
SL CV PED ECHO LEFT VENTRICLE DIASTOLIC VOLUME (MOD BIPLANE) 2D: 107 ML
SL CV PED ECHO LEFT VENTRICLE SYSTOLIC VOLUME (MOD BIPLANE) 2D: 43 ML
TR MAX PG: 18 MMHG
TR PEAK VELOCITY: 2.1 M/S
TRICUSPID ANNULAR PLANE SYSTOLIC EXCURSION: 1.8 CM
TRICUSPID VALVE PEAK REGURGITATION VELOCITY: 2.11 M/S

## 2025-05-14 PROCEDURE — 93306 TTE W/DOPPLER COMPLETE: CPT

## 2025-05-14 PROCEDURE — 93306 TTE W/DOPPLER COMPLETE: CPT | Performed by: INTERNAL MEDICINE

## 2025-05-28 DIAGNOSIS — G43.009 MIGRAINE WITHOUT AURA AND WITHOUT STATUS MIGRAINOSUS, NOT INTRACTABLE: ICD-10-CM

## 2025-05-29 RX ORDER — ATOGEPANT 60 MG/1
1 TABLET ORAL EVERY MORNING
Qty: 30 TABLET | Refills: 6 | Status: SHIPPED | OUTPATIENT
Start: 2025-05-29

## 2025-06-04 ENCOUNTER — OFFICE VISIT (OUTPATIENT)
Age: 35
End: 2025-06-04
Payer: COMMERCIAL

## 2025-06-04 VITALS
SYSTOLIC BLOOD PRESSURE: 112 MMHG | BODY MASS INDEX: 19.67 KG/M2 | HEIGHT: 66 IN | HEART RATE: 98 BPM | TEMPERATURE: 98 F | WEIGHT: 122.4 LBS | DIASTOLIC BLOOD PRESSURE: 60 MMHG | OXYGEN SATURATION: 99 %

## 2025-06-04 DIAGNOSIS — K59.00 CONSTIPATION, UNSPECIFIED CONSTIPATION TYPE: ICD-10-CM

## 2025-06-04 DIAGNOSIS — R68.89 COLD INTOLERANCE OF HAND: ICD-10-CM

## 2025-06-04 DIAGNOSIS — R89.9 ABNORMAL LABORATORY TEST RESULT: Primary | ICD-10-CM

## 2025-06-04 DIAGNOSIS — M25.40 PAINFUL SWELLING OF JOINT: ICD-10-CM

## 2025-06-04 DIAGNOSIS — M79.89 SWELLING OF BOTH HANDS: ICD-10-CM

## 2025-06-04 PROCEDURE — 99213 OFFICE O/P EST LOW 20 MIN: CPT

## 2025-06-04 RX ORDER — CLASCOTERONE 1 G/100G
CREAM TOPICAL
COMMUNITY
Start: 2025-05-19

## 2025-06-04 NOTE — PROGRESS NOTES
"Name: Alondra Blanton      : 1990      MRN: [id number]  Encounter Provider: Rosa Jenkins MD  Encounter Date: 2025   Encounter department: [facility]  :  Assessment & Plan  Abnormal laboratory test result  Patient presents with laboratory paperwork that shows unclear results on ASHISH screen (see media) notes continued symptoms in bilateral hands, with decreased frequency/severity. Workup for other etiologies including cardiac, nephrotic, endocrine otherwise largely unremarkable. Will refer to rheumatology for further lab review and workup for potential rheumatologic conditions associated with patient's symptoms.  Orders:    Ambulatory Referral to Rheumatology; Future    Swelling of both hands  Painful swelling of joint  Cold intolerance of hand  Patient with history of psoriasis, noting painful swelling and cold intolerance in her bilateral hands, often occurring at night. Previous workup largely unremarkable. See above for rest of plan.  Orders:    Ambulatory Referral to Rheumatology; Future    Constipation, unspecified constipation type  Patient reports some constipation since last visit, associated distention. Notes using 1 cap MiraLAX daily with mild symptomatic relief; small BM daily. Advised patient to continue adequate hydration, and may increase to 2 caps of MiraLAX for additional symptomatic relief.            History of Present Illness   Patient is a 34-year-old female with a past medical history of migraine, psoriasis, anxiety, iron and vitamin D deficiency who presents for follow-up visit.  Of note, at patient's last visit, symptoms in her bilateral hands including painful swelling and cold intolerance was discussed.  Patient presents with printout from laboratory workup which reveals unclear ASHISH screen results, otherwise largely unremarkable.  Patient notes that since her last visit, the hand swelling \"has not really happened too much\", but notes continued sensation of " "coldness in her hands, often needing to run them under hot water for symptomatic relief. Additionally, patient  endorses some constipation, stating that she may go 6 days without a BM, and feels distended when this happens.  She recalls using 1 cap of MiraLAX a day, and states that she has been having a small daily BM since starting the MiraLAX.  She denies any other concerns at this time.      Review of Systems   Gastrointestinal:  Positive for constipation.   Endocrine: Positive for cold intolerance (hands b/l).   Musculoskeletal:  Positive for joint swelling (hands b/l).   All other systems reviewed and are negative.    Objective   /60   Pulse 98   Temp 98 °F (36.7 °C)   Ht 5' 6\" (1.676 m)   Wt 55.5 kg (122 lb 6.4 oz)   SpO2 99%   BMI 19.76 kg/m²     Physical Exam  Vitals reviewed.   Constitutional:       Appearance: Normal appearance.      Comments: Body mass index is 19.76 kg/m².  HENT:      Head: Normocephalic and atraumatic.      Right Ear: Tympanic membrane, ear canal and external ear normal.      Left Ear: Tympanic membrane, ear canal and external ear normal.      Nose: Nose normal. No congestion.      Mouth/Throat:      Mouth: Mucous membranes are moist.      Pharynx: Oropharynx is clear.   Eyes:      General: No scleral icterus.     Conjunctiva/sclera: Conjunctivae normal.      Pupils: Pupils are equal, round, and reactive to light.   Neck:      Thyroid: No thyromegaly.   Cardiovascular:      Rate and Rhythm: Normal rate and regular rhythm.      Pulses: Normal pulses.   Pulmonary:      Effort: Pulmonary effort is normal. No respiratory distress.      Breath sounds: Normal breath sounds. No wheezing.   Abdominal:      General: Abdomen is flat. There is no distension.      Palpations: Abdomen is soft.      Tenderness: There is no abdominal tenderness.   Musculoskeletal:      Cervical back: Neck supple.   Skin:     General: Skin is warm and dry.      Capillary Refill: Capillary refill takes less " than 2 seconds.      Coloration: Skin is not pale.      Findings: No erythema or rash.   Neurological:      General: No focal deficit present.      Mental Status: She is alert.      Motor: No weakness.   Psychiatric:         Mood and Affect: Mood normal.         Behavior: Behavior normal.         Thought Content: Thought content normal.         Judgment: Judgment normal.

## 2025-06-18 ENCOUNTER — TELEPHONE (OUTPATIENT)
Age: 35
End: 2025-06-18

## 2025-06-18 NOTE — TELEPHONE ENCOUNTER
VM informing patient's appointment on 6/20/15 with Dr Yudi Ramirez is rescheduled to 6/26/25 at 8:30 a.m. [facility].

## 2025-06-30 NOTE — PROGRESS NOTES
Rheumatology Initial Outpatient Visit  Name: Alondra Blanton      : 1990      MRN: [id number]  Encounter Provider: Jovita Baker MD  Encounter Date: 2025   Encounter department: [facility] [address]  :  Assessment & Plan  Cold intolerance of hand  34-year-old female who presents for further evaluation of temperature intolerance of the hands and feet.  She reports during colder months experiencing hot, red, and slightly swollen hands and feet only at night when trying to sleep. During the warmer months, she has noticed cold hands and feet only at night when trying to sleep. She does not have any characteristic color changes suggestive of Raynaud's and the fact that symptoms only occur at night and never throughout the day also atypical for Raynaud's. She does report redness and hot feeling hands and feet with exercise which could be consistent with erythromelalgia, but this is typically triggered by activity so I am not sure that this could explain the symptoms she experiences only at night nor the cold intolerance. Possible she has some autonomic dysfunction leading to inappropriate blood vessel response when flat. At this time, no clear Rheumatologic cause to explain her symptoms. She may follow-up in Rheumatology as needed.  Orders:    Ambulatory Referral to Rheumatology    Positive ASHISH (antinuclear antibody)  Patient does not report any features of CTD such as photosensitive rash, recurrent hard palate ulcerations, pleurisy, leukopenia, etc. She has cold intolerance of hands, but no characteristic color changes suggestive of Raynaud's. More specific antibody testing negative. Discussed up to 20% of population can have a detectable ASHISH, particularly low level, in the absence of an autoimmune disease.   Orders:    Ambulatory Referral to Rheumatology    Swelling of both hands    Orders:    Ambulatory Referral to Rheumatology      History of Present Illness   HPI  History of Present  Illness  The patient is a 34-year-old female who presents for evaluation of hand pain and swelling with positive ASHISH.    She began experiencing symptoms in her hand approximately 2 years ago, coinciding with the initiation of Qulipta treatment. Initially, she suspected these symptoms might be a side effect of the medication, but her neurologist disagreed. Her symptoms include intense heat in her hands and feet at night, which disrupts her sleep. To alleviate this discomfort, she resorts to cooling her hands and feet with cold water. She also experiences mild swelling in her hands, causing her rings to fit tightly. These symptoms are more pronounced during the winter months and have been less noticeable recently due to warmer weather. However, she has recently started experiencing extreme coldness in her hands and feet at night, regardless of the number of layers she wears. She uses a heating pad to warm her hands and feet sufficiently to sleep comfortably. She does not experience any swelling when her hands are cold, but they do appear slightly paler. She has always had cold hands and feet, but the current level of coldness is unusually severe. When her hands and feet are hot, they turn pink, indicating increased blood flow. Her symptoms are absent in the morning and do not bother her during the day.    An ASHISH test was ordered by her primary care physician, which returned slightly elevated. She reports no new skin rashes, fevers, chest pain, shortness of breath, palpitations, oral or nasal ulcers, dry eyes, hematuria, or foamy urine. She does experience dizziness upon standing quickly, which she attributes to low blood pressure.    She has a history of psoriasis, which has been mild and stable over the past year, with few flare-ups. Her psoriasis typically affects her elbows, knees, and ankles.     Family history includes her grandmother having scleroderma and her grandfather having Sjogren's.    FAMILY HISTORY  Her  grandmother has scleroderma.  Her grandfather has Sjogren's.    Review of Systems  Complete ROS conducted as per HPI. In addition, denies:  Fever  Photosensitive rash  Sicca symptoms  Recurrent oral ulcers  Muscle weakness  Uveitis  Dactylitis  Chest pain  SOB  Pleurisy  Gross hematuria  Foamy urine  Raynaud's  Joint issues other than noted above      Objective   There were no vitals taken for this visit.     Physical Exam  Physical Exam  Constitutional: well appearing, no acute distress  HEENT: normocephalic, sclera clear, no visible oral or nasal ulcers  Neck: supple, no palpable cervical adenopathy  CV: regular rate and rhythm, no murmur  Pulm: normal respiratory effort, lungs clear to auscultation b/l  Skin: no rashes, no skin thickening  Extremities: warm and well perfused, no edema  MSK: No tenderness, no joint synovitis, no effusion      Labs and Imaging  I have personally reviewed pertinent labs and imaging.     ASHISH 1: 80; negative dsDNA, SSA, SSB, Green, RNP, SCL-70  ESR normal

## 2025-07-02 ENCOUNTER — CONSULT (OUTPATIENT)
Age: 35
End: 2025-07-02
Payer: COMMERCIAL

## 2025-07-02 VITALS
HEART RATE: 94 BPM | BODY MASS INDEX: 19.61 KG/M2 | HEIGHT: 66 IN | DIASTOLIC BLOOD PRESSURE: 62 MMHG | TEMPERATURE: 97.1 F | SYSTOLIC BLOOD PRESSURE: 116 MMHG | OXYGEN SATURATION: 99 % | WEIGHT: 122 LBS

## 2025-07-02 DIAGNOSIS — M79.89 SWELLING OF BOTH HANDS: ICD-10-CM

## 2025-07-02 DIAGNOSIS — R68.89 COLD INTOLERANCE OF HAND: Primary | ICD-10-CM

## 2025-07-02 DIAGNOSIS — R76.8 POSITIVE ANA (ANTINUCLEAR ANTIBODY): ICD-10-CM

## 2025-07-02 PROCEDURE — 99204 OFFICE O/P NEW MOD 45 MIN: CPT | Performed by: STUDENT IN AN ORGANIZED HEALTH CARE EDUCATION/TRAINING PROGRAM

## 2025-07-02 NOTE — PATIENT INSTRUCTIONS
What is an ASHISH?     ASHISH stands for anti-nuclear antibody. An antibody is a molecule that is produced by the immune system and has the ability to incite inflammation in autoimmune disease. It has an association with conditions such as systemic lupus, Sjogren's, systemic sclerosis, inflammatory myositis, and mixed connective tissue disease.     The preferred method to measure in the antinuclear antibody is with indirect immunofluorescence. A patient's blood is added to pre-existing cells on a microscope slide. If the antibodies are present, they bind to the nuclei of the pre-existing cells on the microscope slide. Then, a fluorescent marker is added which binds to the antibody to allow the  to see the presence of the antibody under the microscope. If there is no fluorescence, the test is considered negative. If fluorescence is detected, the next step is to determine how much antibody is present. To do this, the technician performs a serial dilution on the patient's blood and repeats the test until there is no longer fluorescence. So, the more times the blood can be diluted and still see the fluorescence under the microscope, the higher the amount of antibody is in the blood. This is reported as a titer level, such as 1:1280. The higher the titer level, the more antibody is present.    The other method used to detect the antinuclear antibody is the enzyme linked immunoassay (JERSEY). This test uses an array of beads with markers that the antibodies can bind to. Unfortunately, this type of testing has the potential for false positive results because the method in which the test is performed can denature the testing material. Therefore, it is important to always perform an immunofluorescence test if the JERSEY test detects an antinuclear antibody.    At what point is an ASHISH test considered to be positive?    According to the American College of Rhe[employer], a positive ASHISH is considered to be a titer level of  "1:80 or higher. Certain labs may report a titer of 1:40 as a \"positive\" test result by default. Up to 30% of healthy individuals can have a positive ASHISH of 1:40, 15% of 1:80, 5% of 1:160, and 3% of 1:320. To put this into numbers, if 5% of the [place] population is ASHISH positive, then 16 million individuals will have a positive ASHISH. In contrast, the prevalence of lupus is only 1/1000 or 320,000 individuals with systemic lupus in the [place]. Thus, the number of patients with a positive ASHISH without systemic lupus would far out number the number of patients with a positive ASHISH and systemic lupus, 50:1.    In addition, healthy relatives of a lupus patient may also have a positive ASHISH with low titer. As we age, such as above the age of 70 years old, we can also develop a low positive ASHISH titer. Another important consideration is whether a patient has another type of autoimmune disease. Many patients with other autoimmune diseases, such as autoimmune thyroid disease, celiac disease, type 1 diabetes, multiple sclerosis, inflammatory bowel disease, etc, also have a positive ASHISH. Lastly, medications can cause a patient to develop an ASHISH. A few examples include medications such as procainamide, hydralazine, minocycline, diltiazem, and TNF inhibitors.    Do we recommend repeat or trend an ASHISH level?    While there is no universal recommendation regarding this, we do know based on research that the level of ASHISH detected does not correlate with the disease activity. So, if a patient is found to have a negative or low titer ASHISH and the initial symptoms remain the same, there is no need to repeat an ASHISH. If a patient develops a new symptom concerning for a rheumatologic condition, such as new onset Raynaud's, there may be a role in repeating the ASHISH test. Similarly, once a patient has positive ASHISH test, there is no need to repeat the test again unless circumstances change.     What is my risk of developing systemic lupus in the future? " "    In patients with a positive ASHISH, approximately 5% of those patients will go on to develop systemic lupus in the future. Alternatively, a patient with a negative ASHISH is unlikely to develop systemic lupus in the future.       Is it possible to have lupus with a negative ASHISH?    As part of the criteria to diagnose systemic lupus, a patient must have an ASHISH present at a titer of at least 1:80. Previously, old testing mechanisms used to detect an ASHISH were not as sensitive as our current testing mechanisms. Historically patients who had symptoms of systemic lupus but did not have a detectable ASHISH were considered to be \"ASHISH negative\" lupus patients. However, these patients DID have detectable antibodies against other antigens, such as an SSA antibody. With the current methods of ASHISH testing with immunofluorescence, it is unlikely that the test would produce a \"false negative,\" meaning that if the test is negative then there is no ASHISH present to detect.     It is possible to have SKIN-limited lupus without having a positive ASHISH. Examples of this include discoid lupus or subacute cutaneous lupus erythematosus among others.      "

## (undated) DEVICE — DRESSING MEPILEX AG BORDER POST-OP 4 X 6 IN

## (undated) DEVICE — SUT MONOCRYL 2-0 CT-1 36 IN Y945H

## (undated) DEVICE — GLOVE SRG BIOGEL 7.5

## (undated) DEVICE — IMPERVIOUS STOCKINETTE: Brand: DEROYAL

## (undated) DEVICE — CUFF TOURNIQUET 30 X 4 IN QUICK CONNECT DISP 1BLA

## (undated) DEVICE — PADDING CAST 6IN COTTON STRL

## (undated) DEVICE — WEBRIL 6 IN UNSTERILE

## (undated) DEVICE — ADHESIVE SKIN HIGH VISCOSITY EXOFIN 1ML

## (undated) DEVICE — 10FR FRAZIER SUCTION HANDLE: Brand: CARDINAL HEALTH

## (undated) DEVICE — GLOVE SRG BIOGEL 6.5

## (undated) DEVICE — 3M™ STERI-DRAPE™ U-DRAPE 1015: Brand: STERI-DRAPE™

## (undated) DEVICE — ASTOUND STANDARD SURGICAL GOWN, XXL: Brand: CONVERTORS

## (undated) DEVICE — GLOVE INDICATOR PI UNDERGLOVE SZ 7 BLUE

## (undated) DEVICE — NEEDLE HYPO 22G X 1-1/2 IN

## (undated) DEVICE — KERLIX BANDAGE ROLL: Brand: KERLIX

## (undated) DEVICE — TELFA NON-ADHERENT ABSORBENT DRESSING: Brand: TELFA

## (undated) DEVICE — SPECIMEN CONTAINER STERILE PEEL PACK

## (undated) DEVICE — SCD SEQUENTIAL COMPRESSION COMFORT SLEEVE MEDIUM KNEE LENGTH: Brand: KENDALL SCD

## (undated) DEVICE — INTENDED FOR TISSUE SEPARATION, AND OTHER PROCEDURES THAT REQUIRE A SHARP SURGICAL BLADE TO PUNCTURE OR CUT.: Brand: BARD-PARKER ® CARBON RIB-BACK BLADES

## (undated) DEVICE — GLOVE PI ULTRA TOUCH SZ.7.5

## (undated) DEVICE — BETHLEHEM TOTAL HIP, KIT: Brand: CARDINAL HEALTH

## (undated) DEVICE — GLOVE SRG BIOGEL 8

## (undated) DEVICE — ACE WRAP 6 IN UNSTERILE

## (undated) DEVICE — SYRINGE 20ML LL

## (undated) DEVICE — BONE WAX WHITE: Brand: BONE WAX WHITE

## (undated) DEVICE — BANDAGE, ESMARK LF STR 6"X9' (20/CS): Brand: CYPRESS

## (undated) DEVICE — GLOVE INDICATOR PI UNDERGLOVE SZ 8 BLUE

## (undated) DEVICE — DRAPE SHEET THREE QUARTER

## (undated) DEVICE — SUT VICRYL 1 CT-1 27 IN J261H

## (undated) DEVICE — 4-PORT MANIFOLD: Brand: NEPTUNE 2

## (undated) DEVICE — COBAN 6 IN STERILE

## (undated) DEVICE — GLOVE PI ULTRA TOUCH SZ.7.0

## (undated) DEVICE — PLUMEPEN PRO 10FT